# Patient Record
Sex: FEMALE | Race: BLACK OR AFRICAN AMERICAN | NOT HISPANIC OR LATINO | Employment: OTHER | URBAN - METROPOLITAN AREA
[De-identification: names, ages, dates, MRNs, and addresses within clinical notes are randomized per-mention and may not be internally consistent; named-entity substitution may affect disease eponyms.]

---

## 2019-01-05 ENCOUNTER — APPOINTMENT (EMERGENCY)
Dept: CT IMAGING | Facility: HOSPITAL | Age: 84
DRG: 070 | End: 2019-01-05
Payer: MEDICARE

## 2019-01-05 ENCOUNTER — APPOINTMENT (INPATIENT)
Dept: MRI IMAGING | Facility: HOSPITAL | Age: 84
DRG: 070 | End: 2019-01-05
Payer: MEDICARE

## 2019-01-05 ENCOUNTER — HOSPITAL ENCOUNTER (INPATIENT)
Facility: HOSPITAL | Age: 84
LOS: 3 days | Discharge: NURSING, REHAB OR LONG-TERM CARE | DRG: 070 | End: 2019-01-08
Attending: EMERGENCY MEDICINE | Admitting: INTERNAL MEDICINE
Payer: MEDICARE

## 2019-01-05 ENCOUNTER — APPOINTMENT (EMERGENCY)
Dept: RADIOLOGY | Facility: HOSPITAL | Age: 84
DRG: 070 | End: 2019-01-05
Payer: MEDICARE

## 2019-01-05 ENCOUNTER — APPOINTMENT (INPATIENT)
Dept: DIALYSIS | Facility: HOSPITAL | Age: 84
DRG: 070 | End: 2019-01-05
Payer: MEDICARE

## 2019-01-05 DIAGNOSIS — G93.40 ACUTE ENCEPHALOPATHY: ICD-10-CM

## 2019-01-05 DIAGNOSIS — R47.1 DYSARTHRIA: ICD-10-CM

## 2019-01-05 DIAGNOSIS — N18.6 ESRD ON HEMODIALYSIS (HCC): Chronic | ICD-10-CM

## 2019-01-05 DIAGNOSIS — Z78.9 NATIONAL INSTITUTES OF HEALTH (NIH) STROKE SCALE LEVEL OF CONSCIOUSNESS SCORE 0, ALERT; KEENLY RESPONSIVE: Primary | ICD-10-CM

## 2019-01-05 DIAGNOSIS — R47.01 APHASIA: ICD-10-CM

## 2019-01-05 DIAGNOSIS — Z99.2 ESRD ON HEMODIALYSIS (HCC): Chronic | ICD-10-CM

## 2019-01-05 DIAGNOSIS — R47.01 MIXED APHASIA: ICD-10-CM

## 2019-01-05 PROBLEM — I10 ESSENTIAL HYPERTENSION: Chronic | Status: ACTIVE | Noted: 2019-01-05

## 2019-01-05 LAB
ABO GROUP BLD: NORMAL
ANION GAP BLD CALC-SCNC: 15 MMOL/L (ref 4–13)
ANION GAP SERPL CALCULATED.3IONS-SCNC: 11 MMOL/L (ref 4–13)
APTT PPP: 32 SECONDS (ref 26–38)
ATRIAL RATE: 69 BPM
BASE EXCESS BLDA CALC-SCNC: -3 MMOL/L (ref -2–3)
BLD GP AB SCN SERPL QL: NEGATIVE
BUN BLD-MCNC: 44 MG/DL (ref 5–25)
BUN SERPL-MCNC: 51 MG/DL (ref 5–25)
CA-I BLD-SCNC: 1.2 MMOL/L (ref 1.12–1.32)
CA-I BLD-SCNC: 1.22 MMOL/L (ref 1.12–1.32)
CALCIUM SERPL-MCNC: 9.5 MG/DL (ref 8.3–10.1)
CHLORIDE BLD-SCNC: 110 MMOL/L (ref 100–108)
CHLORIDE SERPL-SCNC: 107 MMOL/L (ref 100–108)
CO2 SERPL-SCNC: 22 MMOL/L (ref 21–32)
CREAT BLD-MCNC: 7.1 MG/DL (ref 0.6–1.3)
CREAT SERPL-MCNC: 6.59 MG/DL (ref 0.6–1.3)
ERYTHROCYTE [DISTWIDTH] IN BLOOD BY AUTOMATED COUNT: 16.5 % (ref 11.6–15.1)
GFR SERPL CREATININE-BSD FRML MDRD: 6 ML/MIN/1.73SQ M
GFR SERPL CREATININE-BSD FRML MDRD: 6 ML/MIN/1.73SQ M
GLUCOSE SERPL-MCNC: 111 MG/DL (ref 65–140)
GLUCOSE SERPL-MCNC: 114 MG/DL (ref 65–140)
GLUCOSE SERPL-MCNC: 115 MG/DL (ref 65–140)
HCO3 BLDA-SCNC: 21.8 MMOL/L (ref 24–30)
HCT VFR BLD AUTO: 32.9 % (ref 34.8–46.1)
HCT VFR BLD CALC: 31 % (ref 34.8–46.1)
HCT VFR BLD CALC: 33 % (ref 34.8–46.1)
HGB BLD-MCNC: 10.1 G/DL (ref 11.5–15.4)
HGB BLDA-MCNC: 10.5 G/DL (ref 11.5–15.4)
HGB BLDA-MCNC: 11.2 G/DL (ref 11.5–15.4)
INR PPP: 1.11 (ref 0.86–1.17)
MCH RBC QN AUTO: 27.4 PG (ref 26.8–34.3)
MCHC RBC AUTO-ENTMCNC: 30.7 G/DL (ref 31.4–37.4)
MCV RBC AUTO: 89 FL (ref 82–98)
P AXIS: 59 DEGREES
PCO2 BLD: 22 MMOL/L (ref 21–32)
PCO2 BLD: 23 MMOL/L (ref 21–32)
PCO2 BLD: 38 MM HG (ref 42–50)
PH BLD: 7.37 [PH] (ref 7.3–7.4)
PLATELET # BLD AUTO: 182 THOUSANDS/UL (ref 149–390)
PMV BLD AUTO: 10.1 FL (ref 8.9–12.7)
PO2 BLD: 133 MM HG (ref 35–45)
POTASSIUM BLD-SCNC: 4.9 MMOL/L (ref 3.5–5.3)
POTASSIUM BLD-SCNC: 5 MMOL/L (ref 3.5–5.3)
POTASSIUM SERPL-SCNC: 5 MMOL/L (ref 3.5–5.3)
PR INTERVAL: 148 MS
PROTHROMBIN TIME: 14 SECONDS (ref 11.8–14.2)
QRS AXIS: -14 DEGREES
QRSD INTERVAL: 84 MS
QT INTERVAL: 430 MS
QTC INTERVAL: 451 MS
RBC # BLD AUTO: 3.69 MILLION/UL (ref 3.81–5.12)
RH BLD: POSITIVE
SAO2 % BLD FROM PO2: 99 % (ref 95–98)
SODIUM BLD-SCNC: 141 MMOL/L (ref 136–145)
SODIUM BLD-SCNC: 141 MMOL/L (ref 136–145)
SODIUM SERPL-SCNC: 140 MMOL/L (ref 136–145)
SPECIMEN EXPIRATION DATE: NORMAL
SPECIMEN SOURCE: ABNORMAL
SPECIMEN SOURCE: ABNORMAL
SPECIMEN SOURCE: NORMAL
T WAVE AXIS: 0 DEGREES
TROPONIN I BLD-MCNC: 0.02 NG/ML (ref 0–0.08)
VENTRICULAR RATE: 66 BPM
WBC # BLD AUTO: 7.49 THOUSAND/UL (ref 4.31–10.16)

## 2019-01-05 PROCEDURE — 99222 1ST HOSP IP/OBS MODERATE 55: CPT | Performed by: INTERNAL MEDICINE

## 2019-01-05 PROCEDURE — G8996 SWALLOW CURRENT STATUS: HCPCS

## 2019-01-05 PROCEDURE — 86900 BLOOD TYPING SEROLOGIC ABO: CPT | Performed by: EMERGENCY MEDICINE

## 2019-01-05 PROCEDURE — 93010 ELECTROCARDIOGRAM REPORT: CPT | Performed by: INTERNAL MEDICINE

## 2019-01-05 PROCEDURE — 84484 ASSAY OF TROPONIN QUANT: CPT

## 2019-01-05 PROCEDURE — 99291 CRITICAL CARE FIRST HOUR: CPT | Performed by: PSYCHIATRY & NEUROLOGY

## 2019-01-05 PROCEDURE — 86901 BLOOD TYPING SEROLOGIC RH(D): CPT | Performed by: EMERGENCY MEDICINE

## 2019-01-05 PROCEDURE — 93005 ELECTROCARDIOGRAM TRACING: CPT

## 2019-01-05 PROCEDURE — 1123F ACP DISCUSS/DSCN MKR DOCD: CPT | Performed by: PSYCHIATRY & NEUROLOGY

## 2019-01-05 PROCEDURE — 99285 EMERGENCY DEPT VISIT HI MDM: CPT

## 2019-01-05 PROCEDURE — 85027 COMPLETE CBC AUTOMATED: CPT | Performed by: EMERGENCY MEDICINE

## 2019-01-05 PROCEDURE — 71045 X-RAY EXAM CHEST 1 VIEW: CPT

## 2019-01-05 PROCEDURE — 86850 RBC ANTIBODY SCREEN: CPT | Performed by: EMERGENCY MEDICINE

## 2019-01-05 PROCEDURE — 36415 COLL VENOUS BLD VENIPUNCTURE: CPT | Performed by: EMERGENCY MEDICINE

## 2019-01-05 PROCEDURE — 5A1D70Z PERFORMANCE OF URINARY FILTRATION, INTERMITTENT, LESS THAN 6 HOURS PER DAY: ICD-10-PCS | Performed by: INTERNAL MEDICINE

## 2019-01-05 PROCEDURE — 84295 ASSAY OF SERUM SODIUM: CPT

## 2019-01-05 PROCEDURE — 82803 BLOOD GASES ANY COMBINATION: CPT

## 2019-01-05 PROCEDURE — G8998 SWALLOW D/C STATUS: HCPCS

## 2019-01-05 PROCEDURE — 80048 BASIC METABOLIC PNL TOTAL CA: CPT | Performed by: EMERGENCY MEDICINE

## 2019-01-05 PROCEDURE — 80047 BASIC METABLC PNL IONIZED CA: CPT

## 2019-01-05 PROCEDURE — 99223 1ST HOSP IP/OBS HIGH 75: CPT | Performed by: INTERNAL MEDICINE

## 2019-01-05 PROCEDURE — 85014 HEMATOCRIT: CPT

## 2019-01-05 PROCEDURE — 70450 CT HEAD/BRAIN W/O DYE: CPT

## 2019-01-05 PROCEDURE — 92610 EVALUATE SWALLOWING FUNCTION: CPT

## 2019-01-05 PROCEDURE — 82330 ASSAY OF CALCIUM: CPT

## 2019-01-05 PROCEDURE — 70551 MRI BRAIN STEM W/O DYE: CPT

## 2019-01-05 PROCEDURE — 85730 THROMBOPLASTIN TIME PARTIAL: CPT | Performed by: EMERGENCY MEDICINE

## 2019-01-05 PROCEDURE — 82947 ASSAY GLUCOSE BLOOD QUANT: CPT

## 2019-01-05 PROCEDURE — 84132 ASSAY OF SERUM POTASSIUM: CPT

## 2019-01-05 PROCEDURE — 85610 PROTHROMBIN TIME: CPT | Performed by: EMERGENCY MEDICINE

## 2019-01-05 PROCEDURE — G8997 SWALLOW GOAL STATUS: HCPCS

## 2019-01-05 RX ORDER — LABETALOL 100 MG/1
100 TABLET, FILM COATED ORAL 2 TIMES DAILY
Status: DISCONTINUED | OUTPATIENT
Start: 2019-01-05 | End: 2019-01-08 | Stop reason: HOSPADM

## 2019-01-05 RX ORDER — POLYETHYLENE GLYCOL 3350 17 G/17G
17 POWDER, FOR SOLUTION ORAL DAILY
COMMUNITY

## 2019-01-05 RX ORDER — LORAZEPAM 2 MG/ML
1 INJECTION INTRAMUSCULAR ONCE
Status: COMPLETED | OUTPATIENT
Start: 2019-01-05 | End: 2019-01-05

## 2019-01-05 RX ORDER — ALBUTEROL SULFATE 2.5 MG/3ML
2.5 SOLUTION RESPIRATORY (INHALATION) EVERY 6 HOURS PRN
COMMUNITY

## 2019-01-05 RX ORDER — AMLODIPINE BESYLATE 5 MG/1
5 TABLET ORAL DAILY
Status: DISCONTINUED | OUTPATIENT
Start: 2019-01-06 | End: 2019-01-08 | Stop reason: HOSPADM

## 2019-01-05 RX ORDER — ALBUTEROL SULFATE 90 UG/1
2 AEROSOL, METERED RESPIRATORY (INHALATION) EVERY 6 HOURS PRN
COMMUNITY

## 2019-01-05 RX ORDER — SODIUM CHLORIDE 9 MG/ML
40 INJECTION, SOLUTION INTRAVENOUS CONTINUOUS
Status: DISPENSED | OUTPATIENT
Start: 2019-01-05 | End: 2019-01-06

## 2019-01-05 RX ORDER — KETOROLAC TROMETHAMINE 4 MG/ML
1 SOLUTION/ DROPS OPHTHALMIC 4 TIMES DAILY
COMMUNITY

## 2019-01-05 RX ORDER — AMLODIPINE BESYLATE 5 MG/1
5 TABLET ORAL DAILY
COMMUNITY
End: 2019-12-27 | Stop reason: HOSPADM

## 2019-01-05 RX ORDER — ASPIRIN 325 MG
325 TABLET ORAL ONCE
Status: COMPLETED | OUTPATIENT
Start: 2019-01-05 | End: 2019-01-05

## 2019-01-05 RX ORDER — BUSPIRONE HYDROCHLORIDE 5 MG/1
5 TABLET ORAL 3 TIMES DAILY
Status: DISCONTINUED | OUTPATIENT
Start: 2019-01-05 | End: 2019-01-08 | Stop reason: HOSPADM

## 2019-01-05 RX ORDER — BUSPIRONE HYDROCHLORIDE 5 MG/1
5 TABLET ORAL 3 TIMES DAILY
COMMUNITY

## 2019-01-05 RX ORDER — NITROGLYCERIN 0.4 MG/1
0.4 TABLET SUBLINGUAL
COMMUNITY

## 2019-01-05 RX ORDER — ALBUMIN (HUMAN) 12.5 G/50ML
12.5 SOLUTION INTRAVENOUS ONCE
Status: COMPLETED | OUTPATIENT
Start: 2019-01-05 | End: 2019-01-05

## 2019-01-05 RX ORDER — ALBUTEROL SULFATE 2.5 MG/3ML
2.5 SOLUTION RESPIRATORY (INHALATION) EVERY 6 HOURS PRN
Status: DISCONTINUED | OUTPATIENT
Start: 2019-01-05 | End: 2019-01-08 | Stop reason: HOSPADM

## 2019-01-05 RX ORDER — ACETAMINOPHEN 325 MG/1
650 TABLET ORAL EVERY 6 HOURS PRN
COMMUNITY

## 2019-01-05 RX ORDER — FENTANYL 25 UG/H
1 PATCH TRANSDERMAL
Status: ON HOLD | COMMUNITY
End: 2020-01-08 | Stop reason: SDUPTHER

## 2019-01-05 RX ORDER — DOCUSATE SODIUM 100 MG/1
100 CAPSULE, LIQUID FILLED ORAL 2 TIMES DAILY
COMMUNITY

## 2019-01-05 RX ORDER — HEPARIN SODIUM 5000 [USP'U]/ML
5000 INJECTION, SOLUTION INTRAVENOUS; SUBCUTANEOUS EVERY 8 HOURS SCHEDULED
Status: DISCONTINUED | OUTPATIENT
Start: 2019-01-05 | End: 2019-01-08 | Stop reason: HOSPADM

## 2019-01-05 RX ORDER — ONDANSETRON 2 MG/ML
4 INJECTION INTRAMUSCULAR; INTRAVENOUS EVERY 6 HOURS PRN
Status: DISCONTINUED | OUTPATIENT
Start: 2019-01-05 | End: 2019-01-08 | Stop reason: HOSPADM

## 2019-01-05 RX ORDER — ATORVASTATIN CALCIUM 40 MG/1
40 TABLET, FILM COATED ORAL EVERY EVENING
Status: DISCONTINUED | OUTPATIENT
Start: 2019-01-05 | End: 2019-01-08 | Stop reason: HOSPADM

## 2019-01-05 RX ORDER — ASPIRIN 325 MG
325 TABLET ORAL DAILY
Status: DISCONTINUED | OUTPATIENT
Start: 2019-01-06 | End: 2019-01-08

## 2019-01-05 RX ORDER — LABETALOL 100 MG/1
100 TABLET, FILM COATED ORAL 2 TIMES DAILY
COMMUNITY
End: 2020-01-08 | Stop reason: HOSPADM

## 2019-01-05 RX ORDER — SERTRALINE HYDROCHLORIDE 25 MG/1
50 TABLET, FILM COATED ORAL DAILY
COMMUNITY

## 2019-01-05 RX ORDER — ACETAMINOPHEN 325 MG/1
650 TABLET ORAL EVERY 6 HOURS PRN
Status: DISCONTINUED | OUTPATIENT
Start: 2019-01-05 | End: 2019-01-08 | Stop reason: HOSPADM

## 2019-01-05 RX ADMIN — ALBUMIN HUMAN 12.5 G: 0.25 SOLUTION INTRAVENOUS at 18:53

## 2019-01-05 RX ADMIN — LORAZEPAM 1 MG: 2 INJECTION INTRAMUSCULAR; INTRAVENOUS at 15:40

## 2019-01-05 RX ADMIN — HEPARIN SODIUM 5000 UNITS: 5000 INJECTION, SOLUTION INTRAVENOUS; SUBCUTANEOUS at 14:56

## 2019-01-05 RX ADMIN — HEPARIN SODIUM 5000 UNITS: 5000 INJECTION, SOLUTION INTRAVENOUS; SUBCUTANEOUS at 22:28

## 2019-01-05 RX ADMIN — SODIUM CHLORIDE 40 ML/HR: 0.9 INJECTION, SOLUTION INTRAVENOUS at 19:09

## 2019-01-05 RX ADMIN — ASPIRIN 325 MG: 325 TABLET ORAL at 10:35

## 2019-01-05 NOTE — CONSULTS
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:  Hemoglobin is 10 1 will not be able the receive any CARRIE at this point given acute CVA  3  Fluid management:  Her dry weight as an outpatient is 82 5   UF goal is even    4  Acute left hemispheric CVA:  Management per Neurology    5  Permissive hypertension:  Allow increased blood pressure in the setting of acute CVA as noted above  Plan:  As above      History of Present Illness   Physician Requesting Consult: Nancy Toussaint MD  Reason for Consult / Principal Problem:  End-stage renal disease on hemodialysis  Hx and PE limited by:   HPI: Dallin Lima is a 81y o  year old female who presents with aphasia  I was contacted by the emergency room as the patient was brought to the emergency room and she did not have dialysis  I saw the patient approximately 1:00 p m  This afternoon She has a history of end-stage renal disease on hemodialysis and normally dialyzes at [address] on a Tuesday Thursday Saturday basis  I called the outpatient dialysis unit  The patient presented with increased focal weakness when I spoke with the patient's nurse  The patient was brought to the emergency room and I saw the patient the emergency room earlier this afternoon  She was found to have an acute left hemispheric CVA  I spoke with inpatient dialysis nurse and the patient was/is being dialyzed this afternoon  We are allowed for permissive hypertension given the acute left hemispheric CVA  Last blood pressure was 197/74      Inpatient consult to Nephrology  Consult performed by: Yamini Strange ordered by: Dhara Ortiz DEANGELO          General:  Increased fatigue she denies any weakness for me this afternoon  Cardiovascular:  No chest pressure or shortness of Breath  Respiratory:  No cough no epistaxis no hemoptysis reported  Gastrointestinal:  No nausea vomiting diarrhea constipation reported  Genitourinary:  No urgency frequency hematuria reported  Outside of the above review of systems, all others are essentially negative    Historical Information   Past Medical History:   Diagnosis Date    Altered mental status, unspecified     Anemia     Atherosclerotic heart disease of native coronary artery without angina pectoris     Cancer ([place] [address] )     Chest pain     Chronic diastolic (congestive) heart failure (HCC)     Dependence [facility] ([facility] )     Diabetes mellitus ([place] [address] )     End-stage renal disease ([place] [address] )     Gastro-esophageal reflux disease without esophagitis     Hyperlipidemia     Hypertension     Long term current use of anticoagulant     Long term current use of insulin ([facility])     Muscle weakness     Nausea with vomiting     Other abnormalities of gait and mobility     Other specified abnormal findings of blood chemistry     Type 2 diabetes mellitus ([facility])     Ventral hernia without obstruction or gangrene     Vitamin D deficiency      Past Surgical History:   Procedure Laterality Date    AV FISTULA PLACEMENT      MASTECTOMY      R arm     Social History   History   Alcohol Use No     History   Drug Use No     History   Smoking Status    Never Smoker   Smokeless Tobacco    Not on file     History reviewed  No pertinent family history      Meds/Allergies   all current active meds have been reviewed, current meds: Current Facility-Administered Medications   Medication Dose Route Frequency    acetaminophen (TYLENOL) tablet 650 mg  650 mg Oral Q6H PRN    albuterol inhalation solution 2 5 mg  2 5 mg Nebulization Q6H PRN    alteplase (ACTIVASE) 100 mg injection **ADS Override Pull**        [START ON 1/6/2019] amLODIPine (NORVASC) tablet 5 mg  5 mg Oral Daily    [START ON 1/6/2019] aspirin tablet 325 mg  325 mg Oral Daily    atorvastatin (LIPITOR) tablet 40 mg  40 mg Oral QPM    busPIRone (BUSPAR) tablet 5 mg  5 mg Oral TID    heparin (porcine) subcutaneous injection 5,000 Units  5,000 Units Subcutaneous Q8H [facility]    labetalol (NORMODYNE) tablet 100 mg  100 mg Oral BID    ondansetron (ZOFRAN) injection 4 mg  4 mg Intravenous Q6H PRN    [START ON 1/6/2019] sertraline (ZOLOFT) tablet 50 mg  50 mg Oral Daily    and PTA meds:  Prescriptions Prior to Admission   Medication    acetaminophen (TYLENOL) 325 mg tablet    albuterol (2 5 mg/3 mL) 0 083 % nebulizer solution    albuterol (PROVENTIL HFA,VENTOLIN HFA) 90 mcg/act inhaler    amLODIPine (NORVASC) 5 mg tablet    bisacodyl (DULCOLAX) 5 mg EC tablet    busPIRone (BUSPAR) 5 mg tablet    docusate sodium (COLACE) 100 mg capsule    fentaNYL (DURAGESIC) 25 mcg/hr    ketorolac (ACULAR) 0 4 % SOLN    labetalol (NORMODYNE) 100 mg tablet    nitroglycerin (NITROSTAT) 0 4 mg SL tablet    polyethylene glycol (MIRALAX) 17 g packet    sertraline (ZOLOFT) 25 mg tablet       Allergies   Allergen Reactions    Percolone [Oxycodone]     Sulfa Antibiotics        Objective     Intake/Output Summary (Last 24 hours) at 01/05/19 3108  Last data filed at 01/05/19 1715   Gross per 24 hour   Intake              200 ml   Output                0 ml   Net              200 ml       Invasive Devices:        General: patient in NAD  Skin:   Eyes:   ENT:   Neck:   Chest:   CVS:   Abdomen:   Extremities:  Neuro:   Psych:     Current Weight: Weight - Scale: 84 6 kg (186 lb 8 2 oz)  First Weight: Weight - Scale: 84 6 kg (186 lb 8 2 oz)    Lab Results:  I have personally reviewed pertinent labs    CBC: Lab Results   Component Value Date    WBC 7 49 01/05/2019    RBC 3 69 (L) 01/05/2019     CMP: Lab Results   Component Value Date     01/05/2019    CO2 23 01/05/2019    BUN 51 (H) 01/05/2019    CREATININE 6 59 (H) 01/05/2019    GLUCOSE 111 01/05/2019    CALCIUM 9 5 01/05/2019    EGFR 6 01/05/2019     Phosphorus: No results found for: PHOS  Magnesium: No results found for: MG  Urinalysis: No results found for: COLORU, CLARITYU, SPECGRAV, PHUR, LEUKOCYTESUR, NITRITE, PROTEINUA, GLUCOSEU, KETONESU, BILIRUBINUR, BLOODU  BMP: Lab Results   Component Value Date    GLUCOSE 111 01/05/2019    SODIUM 140 01/05/2019    CHLORIDE 110 (H) 01/05/2019    CO2 23 01/05/2019    BUN 51 (H) 01/05/2019    CREATININE 6 59 (H) 01/05/2019    CALCIUM 9 5 01/05/2019

## 2019-01-05 NOTE — QUICK NOTE
Received call from [facility] for RN concerning patient's mental status  Went to personally evaluate the patient  She is alert to herself, but unable to answer any other questions  I suspect that this benzodiazepine due secondary to the patient's Ativan doses before MRI  Her MRI is resulted and is negative for any acute CVA  The patient has been removed from stroke pathway  I discussed with nephrologist Dr Henry Su who will hold dialysis for tonight given change in mental status  I discussed with him as far as some fluid resuscitation for this patient appears dry on exam   We agree to give the patient a 1 time dose of 25% albumin as well as 250 cc of fluid given over 6 hours  Stroke pathway has been discontinued  Plan discussed with ARSALAN Wolfe PA-C

## 2019-01-05 NOTE — CONSULTS
Consultation - Neurology   Rafael Larson 81 y o  female MRN: [id number]  Unit/Bed#: ED 05 Encounter: [id number]      Physician Requesting Consult: [address] [place] [address] to Neurology  Consult performed by: Cristina MAHONEY  Consult ordered by: Bar Suárez        Reason for Consult / Principal Problem: stroke alert    Assessment:  Acute left hemispheric cva, cardioembolic vs atheroembolic, aphasia resolved therefore although within therapeutic window for IV tpa, not administered  Pt is currently mildly encephalopathic  Perhaps distal clot, cannot exclude seizure such as complex partial event as no witnessed generalization  No known seizure hx  Unlikely hypertensive encephalopathy as clinical exam has improved however still hypertensive  Given IV access issues unable to obtain cta head/neck however low suspicion there would be a proximal retrieval clot given the rapid improvement, possibly a distal clot if at all that wouldn't be likely retrievable  Plan:  Admit to slim  Stroke pathway  Aspirin 325 mg then 81 mg daily   lipitor 80 mg  Tele  2-D echo  Mri brain w/o contrast, mra head/neck w/o contrast  If brain imaging negative for acute cva would recommend eeg on Monday  Continue regular dialysis regimen  Give prn antihypertensives for sbp>200, 48 hour permissive htn for now  Stat head ct with any neuro change      HPI: Rafael Larson is a 81 y o  female who was last normal around 7:30 am at [address] [place] [address] facility had just arrived at dialysis center around 8 am when they noticed she was having trouble speaking and processing info  Not complaining of any discomfort  Dialysis was not yet started  EMS called and prehospital stroke alert at [address] [place] [address] at 8:30 am   Neurology team call back at 8:30 am and bedside at 9 am  CT head showing white matter disease, chronic left frontal and rt cerebellar cva, no acute hemorrage or early signs of acute strok    Pt per nursing staff and ED attending still had mixed aphasia and intermittent dysarthria during their initial evaluation however by the time I arrived the aphasia had resolved and I didn't notice any dysarhthria  nih SS 6 pts with 1 pt for level of conscousness, 2 pt motor for each LE, 1 pt extinction/inattention however motor points likely chronic and given the resolved aphasia and decision made not to give IV tpa  ROS:  Per 12 point review, negative except for pain in knees, shoulders, blurry vision  Historical Information   No past medical history on file  No past surgical history on file  Social History   History   Smoking Status    Not on file   Smokeless Tobacco    Not on file     History   Alcohol use Not on file     History   Drug use: Unknown       Family History: non-contributory      Meds/Allergies     Allergies   Allergen Reactions    Percolone [Oxycodone]     Sulfa Antibiotics        all current active meds have been reviewed    Objective   Vitals:Blood pressure (!) 196/83, pulse 69, temperature 98 3 °F (36 8 °C), temperature source Oral, resp  rate 17, weight 84 6 kg (186 lb 8 2 oz), SpO2 98 %  ,Body mass index is 33 04 kg/m²  No intake or output data in the 24 hours ending 01/05/19 0948        Physical Exam:   Physical Exam General appearance: sluggish, no apparent distress  Lungs: clear to auscultation bilaterally  Heart: regular rate and rhythm    Neurologic:  Cognitive:  Mental status: somnolent, easily arousable to verbal  Oriented to self, [facility], not sure why she's here or what happened, said she was at the dialysis center and people began asking her questions  Denies any pain or any other issues  Able to read words, name objects, tell time, follow simple commands, explain what is happening in the pictures from the stroke booklet, able to tell how many fingers i'm holding up  At times right/left confusion not consistent   She did consistently state I was touching the right leg when I was touching both legs  No evidence of neglect  She was able to tell me that she has difficulty moving her legs normally and that she can't really move her feet  CN: CNII-XII normal except both pupils 1 5 mm trace reactive bilat, slightly dysconjugate exotropic bilat, cannot fully move eye all the way to either side and no upgaze  Decreased hearing to finger rub on the left side  Blink to threat intact bilat  Motor: normal bulk throughout  No involuntary movements noted  Increased tone throughout, mostly bilat LE, LLE>RLE  5 power ue bilat with 4+ handgrip bilat, limited range of movement given shoulder issues  Unable to lift either LE antigravity, can lock out legs straightening them out and hold in this position but cannot elevate off the bed  Appears to be chronic  Rt quad/hs 5, dorsiflexion 1, plantarflexion 5  On the left side given more stiffness in the knee, can barely flex at the knee thus cannot assess hamstring  Left quad is 5, doriflexion 1, plantarflexion 5  Both feet in downward plantarflexed position with minimal range in dorsiflexion movement  Sensory: light touch, temperature sensation intact throughout  Cerebellar: cannot due finger to nose bilat given structural limitations (both shoulders) and can place both feet barely onto shin but given significant knee stiffness, especially on the left, unable to move it proximally  DTR's: 2 rue, 2+ lue, negative duron bilat, patellars 2+ right, 2 left, ankles trace bilat, negative cross adductors  Plantars: downgoing bilat        Lab Results: I have personally reviewed pertinent reports  Imaging Studies: I have personally reviewed pertinent reports  EKG, Pathology, and Other Studies: I have personally reviewed pertinent films in PACS        Counseling / Coordination of Care  45 min critical care time spent

## 2019-01-05 NOTE — ED NOTES
Spoke with pts Daughter Jane Leal, updated on pts status and expected plan of care       Antwan Fleming RN  01/05/19 5741

## 2019-01-05 NOTE — ASSESSMENT & PLAN NOTE
· POA, patient's mental status is improving and appears to be at baseline  I believe that the patient's functional status is overall low  Her MRI was negative for acute infarction   Daughter reports that this happened earlier this summer and she "snapped out of it"  · Management as per primary   · Monitor for infectious process   · Encourage PO intake  · Speech consult

## 2019-01-05 NOTE — ED NOTES
Spoke with Yobani Bowers from MRI, will be over pt for in approx 10 mins        Taylor Maki RN  01/05/19 6477

## 2019-01-05 NOTE — SPEECH THERAPY NOTE
Speech-Language Pathology Bedside Swallow Evaluation      Patient Name: Dorita Vanegas    AOTGE'W Date: 2019     Problem List  Patient Active Problem List   Diagnosis    Mixed aphasia    [facility]    Essential hypertension    Acute encephalopathy       Past Medical History  Past Medical History:   Diagnosis Date    Altered mental status, unspecified     Anemia     Atherosclerotic heart disease of native coronary artery without angina pectoris     Cancer ([facility]     Chest pain     Chronic diastolic (congestive) heart failure (HCC)     Dependence on renal dialysis ([facility]     [facility]     End-stage renal disease ([facility]     Gastro-esophageal reflux disease without esophagitis     Hyperlipidemia     Hypertension     Long term current use of anticoagulant     Long term current use of insulin (HCC)     Muscle weakness     Nausea with vomiting     Other abnormalities of gait and mobility     Other specified abnormal findings of blood chemistry     Type 2 diabetes mellitus (HCC)     Ventral hernia without obstruction or gangrene     Vitamin D deficiency        Past Surgical History  Past Surgical History:   Procedure Laterality Date    AV FISTULA PLACEMENT      MASTECTOMY      R arm           History of Present Illness:     Francisco Boucher a 81 y  o  female with a history of ESRD on HD, HTN who presents with aphasia  The patient's daughter reports that she was transported for hemodialysis and when she got to hemodialysis she was behaving different  The patient is able to state that she was unable to say a person's name at dialysis and that she was having a hard time understanding what other people were saying  The patient's daughter reports that sometimes the patient has a hard time speaking at her baseline, but states that she had a similar episode like this last summer which she just "snapped out of"   She was taken to [facility] at that time, but a work up was not completed due to insufficient staffing  The patient denied any symptoms such as numbness, tingling, or weakness in her arms, legs, or face  She denied dizziness or difficulty swallowing  The patient's daughter reports that she is slurring her speech at this time as well as it appears to her that her left face is drooping some, specifically around her mouth  Denies chest pain, pressure, or palpitations  Denies fevers or chills  Denies abdominal pain, nausea, vomiting, or diarrhea  Daughter reports that she saw her mother earlier in the week and she was doing well, but she did start to notice changes late yesterday  DX:  R/O CVA vs encephalopathy vs other  Pt failed RN Dysphagia Assessment  SLP evaluation requested to assess pt (presume swallowing and speech/language skills)  Process begun this pm c Bedside Swallowing Evaluation  Past medical history:  Please see H&P for details    Special Studies:  1  CT 1/5: Cerebral atrophy with chronic small vessel ischemic change  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   2  MRI pending      Swallow Information      Current Symptoms/Concerns: change in MS, neuro hx    Current Diet: Renal diet ordered, I presumed diet held after seeing pt failed RN Assessment      Baseline Diet: regular diet and thin liquids presumed      Baseline Assessment   Behavior/Cognition: alert    Speech/Language Status: Pt "staring" upon arrival   Made eye contact with greeting  Followed a few simple commands (imitated some lip and arm movements and imitated phonation)  Produced some intelligible "automatic speech" ("Hello  Yes  I don't know)  In general, there was a paucity of speech c limited intelligible utterances (intelligibility reduced in part by reduced oral opening and effort but ?some nonsensical speech or neologisms)    Southern accent noted (when asked, was able to report, I lived in [place])/    Patient Positioning: upright in bed    Pain Status/Interventions/Response to Interventions:  No report of or nonverbal indications of pain  Swallow Mechanism Exam     Facial: mild L weakness  Labial: WFL  Lingual: did not imitate movements except protrusion c mild L deviation noted, but reflexive lip licking c good rom  Velum: unable to visualize   Dentition: adequate natural dentition  Mandible: adequate ROM  Vocal quality:clear/adequate     Consistencies Assessed and Performance   Consistencies/Matewrials Administered: thin liquid and yang crackers  Pt nodded "no" to other foods ("full")  I then located RN who clarified that error was made when documenting RN Dysphagia Assessment  Pt has passed assessment & had eaten lunch  Oral Stage: WFL with thin liquid by straw  Note pt took only single sips  Mastication was adequate with the materials administered today  Bolus formation and transfer were functional with no significant oral residue noted  No overt s/s reduced oral control  Pharyngeal Stage: WFL    Swallow Mechanics:  Swallowing initiation appeared prompt  Laryngeal rise was palpated and judged to be within functional limits  No coughing, throat clearing, change in vocal quality or respiratory status noted today  Esophageal Concerns: none reported      Summary   Pt presented with functional appearing oral and pharyngeal stage swallowing skills with limited materials administered today (3ozs water, yang crackers)  She drank slowly (regulated rate which should maximize safety)    Recommendations: 1  Continue diet as per MD (as medically & clinically indicated)  2   Await MRI and complete speech/language evaluation as indicated    Recommended Form of Meds: as tolerated    Aspiration precautions and compensatory swallowing strategies: feed when attentive to task

## 2019-01-05 NOTE — ED NOTES
Pt transported to [facility], [facility]  01/05/19 6458

## 2019-01-05 NOTE — HEMODIALYSIS
Patient received only 40 minutes of dialysis today  Patient was rule out CVA and d/t drop in BP patient taken off early per Dr Frantz Kelley

## 2019-01-05 NOTE — ASSESSMENT & PLAN NOTE
· POA, improved  MRI negative for CVA  ? Epileptic event  ?Dehydration given below dry weight   Neurology recommendations noted and appreciated  · Discontinue stroke pathway   · Check EEG   · Neuro checks

## 2019-01-05 NOTE — PROGRESS NOTES
[facility]  Progress Note - [facility] 1935, 81 y o  female MRN: [id number]    Unit/Bed#: ED 05 Encounter: [id number]    Primary Care Provider: Shannon Metcalf MD   Date and time admitted to hospital: 1/5/2019  8:35 AM        * Mixed aphasia   Assessment & Plan    · POA, slightly improving, but does have this in addition to lateralizing symptoms including left facial droop, left tongue deviation, and left arm weakness  As per daughter this happened earlier in the summer as well, but no work up was completed due to [facility] not having proper staffing  She had "snapped out of it" before  Neurology recommendations noted and appreciated  · Admit patient to med/surg under inpatient status under stroke pathway   · Consult Neuro   · Telemetry x 24 hours   · MRI   · Echo  · Neuro checks  · FLP/A1c  · ASA  · Lipitor  · PT/OT/Speech  · Serial NIH     Acute encephalopathy   Assessment & Plan    · POA, patient's mental status is improving, but is still slow to respond to questions  Daughter reports that this happened earlier this summer and she "snapped out of it"  · Management as per primary   · Monitor for infectious process   · Encourage PO intake - she has passed dysphagia assessment     ESRD on hemodialysis [facility])   Assessment & Plan    · Gets HD on TThS  She missed HD today   · Consult nephrology for management      Essential hypertension   Assessment & Plan    · BP elevated on admission, but want permissive HTN as per primary problem   · Continue Norvasc, Labetalol with hold parameters        VTE Prophylaxis: Heparin  / sequential compression device   Code Status: DNR/DNI  POLST: POLST form is not discussed and not completed at this time  Discussion with family: Discussed with daughter at bedside     Anticipated Length of Stay:  Patient will be admitted on an Inpatient basis with an anticipated length of stay of  Greater than 2 midnights     Justification for Hospital Stay: CVA symptoms Total Time for Visit, including Counseling / Coordination of Care: 1 hour  Greater than 50% of this total time spent on direct patient counseling and coordination of care  Chief Complaint:   Aphasia     History of Present Illness:    Jamal Colindres is a 81 y o  female with a history of ESRD on HD, HTN who presents with aphasia  The patient's daughter reports that she was transported for hemodialysis and when she got to hemodialysis she was behaving different  The patient is able to state that she was unable to say a person's name at dialysis and that she was having a hard time understanding what other people were saying  The patient's daughter reports that sometimes the patient has a hard time speaking at her baseline, but states that she had a similar episode like this last summer which she just "snapped out of"  She was taken to [facility] at that time, but a work up was not completed due to insufficient staffing  The patient denied any symptoms such as numbness, tingling, or weakness in her arms, legs, or face  She denied dizziness or difficulty swallowing  The patient's daughter reports that she is slurring her speech at this time as well as it appears to her that her left face is drooping some, specifically around her mouth  Denies chest pain, pressure, or palpitations  Denies fevers or chills  Denies abdominal pain, nausea, vomiting, or diarrhea  Daughter reports that she saw her mother earlier in the week and she was doing well, but she did start to notice changes late yesterday  Review of Systems:    Review of Systems   Constitutional: Negative for appetite change, chills, diaphoresis, fatigue and fever  HENT: Negative for congestion, rhinorrhea and sore throat  Eyes: Negative for visual disturbance  Respiratory: Negative for cough, chest tightness, shortness of breath and wheezing  Cardiovascular: Negative for chest pain, palpitations and leg swelling     Gastrointestinal: Negative for abdominal pain, constipation, diarrhea, nausea and vomiting  Genitourinary: Negative for dysuria  Musculoskeletal: Positive for gait problem (Chronic)  Negative for arthralgias and myalgias  Neurological: Positive for speech difficulty  Negative for dizziness, syncope, weakness, light-headedness, numbness and headaches  All other systems reviewed and are negative  Past Medical and Surgical History:     Past Medical History:   Diagnosis Date    Anemia     Cancer ([facility] )     Diabetes mellitus ([facility] )     End-stage renal disease ([place] [address] )     Hypertension        Past Surgical History:   Procedure Laterality Date    AV FISTULA PLACEMENT      MASTECTOMY      R arm       Meds/Allergies:    Prior to Admission medications    Medication Sig Start Date End Date Taking?  Authorizing Provider   acetaminophen (TYLENOL) 325 mg tablet Take 650 mg by mouth every 6 (six) hours as needed for mild pain   Yes Historical Provider, MD   albuterol (2 5 mg/3 mL) 0 083 % nebulizer solution Take 2 5 mg by nebulization every 6 (six) hours as needed for wheezing   Yes Historical Provider, MD   albuterol (PROVENTIL HFA,VENTOLIN HFA) 90 mcg/act inhaler Inhale 2 puffs every 6 (six) hours as needed for wheezing   Yes Historical Provider, MD   amLODIPine (NORVASC) 5 mg tablet Take 5 mg by mouth daily   Yes Historical Provider, MD   bisacodyl (DULCOLAX) 5 mg EC tablet Take 5 mg by mouth daily as needed for constipation   Yes Historical Provider, MD   busPIRone (BUSPAR) 5 mg tablet Take 5 mg by mouth 3 (three) times a day   Yes Historical Provider, MD   docusate sodium (COLACE) 100 mg capsule Take 100 mg by mouth 2 (two) times a day   Yes Historical Provider, MD   fentaNYL (DURAGESIC) 25 mcg/hr Place 1 patch on the skin every third day   Yes Historical Provider, MD   ketorolac (ACULAR) 0 4 % SOLN 1 drop 4 (four) times a day   Yes Historical Provider, MD   labetalol (NORMODYNE) 100 mg tablet Take 100 mg by mouth 2 (two) times a day Yes Historical Provider, MD   nitroglycerin (NITROSTAT) 0 4 mg SL tablet Place 0 4 mg under the tongue every 5 (five) minutes as needed for chest pain   Yes Historical Provider, MD   polyethylene glycol (MIRALAX) 17 g packet Take 17 g by mouth daily   Yes Historical Provider, MD   sertraline (ZOLOFT) 25 mg tablet Take 50 mg by mouth daily   Yes Historical Provider, MD     I have reviewed home medications with patient family member  Allergies: Allergies   Allergen Reactions    Percolone [Oxycodone]     Sulfa Antibiotics        Social History:     Marital Status:    Occupation: None  Patient Pre-hospital Living Situation: SNF  Patient Pre-hospital Level of Mobility: Bedbound mostly  Patient Pre-hospital Diet Restrictions: None  Substance Use History:   History   Alcohol Use No     History   Smoking Status    Never Smoker   Smokeless Tobacco    Not on file     History   Drug Use No       Family History:    History reviewed  No pertinent family history  Physical Exam:     Vitals:   Blood Pressure: 165/73 (01/05/19 1230)  Pulse: 58 (01/05/19 1230)  Temperature: 98 3 °F (36 8 °C) (01/05/19 0945)  Temp Source: Oral (01/05/19 0945)  Respirations: 18 (01/05/19 1121)  Weight - Scale: 84 6 kg (186 lb 8 2 oz) (01/05/19 0903)  SpO2: 99 % (01/05/19 1230)    Physical Exam   Constitutional: Vital signs are normal  She appears well-developed and well-nourished  Non-toxic appearance  No distress  HENT:   Head: Normocephalic and atraumatic  Mouth/Throat: Mucous membranes are not dry  Eyes: Pupils are equal, round, and reactive to light  Conjunctivae and EOM are normal  No scleral icterus  Right pupil is round and reactive  Left pupil is round and reactive  Pupils are equal    Pupils miotic, but symmetrical and reactive     Neck: Neck supple  Cardiovascular: Normal rate, regular rhythm, S1 normal, S2 normal, normal heart sounds and intact distal pulses  Exam reveals no S3 and no S4      No murmur heard   Pulmonary/Chest: Effort normal and breath sounds normal  No accessory muscle usage  No respiratory distress  She has no decreased breath sounds  She has no wheezes  She has no rhonchi  She has no rales  She exhibits no tenderness  Abdominal: Soft  Bowel sounds are normal  She exhibits no distension and no mass  There is no tenderness  There is no rigidity, no rebound and no guarding  Neurological: She is alert  She displays no tremor  A cranial nerve deficit (There is left downward facial droop and left tongue deviation) is present  No sensory deficit  She displays no seizure activity  GCS eye subscore is 4  GCS verbal subscore is 4  GCS motor subscore is 6    4/5 strength in left upper extremity  5/5 in right upper  0/5 in bilateral lower extremity, but this is baseline   Skin: Skin is warm and dry  Additional Data:     Lab Results: I have personally reviewed pertinent reports  Results from last 7 days  Lab Units 01/05/19  0855  01/05/19  0850   WBC Thousand/uL  --   --  7 49   HEMOGLOBIN g/dL  --   --  10 1*   I STAT HEMOGLOBIN g/dl 11 2*  < >  --    HEMATOCRIT %  --   --  32 9*   HEMATOCRIT, ISTAT % 33*  < >  --    PLATELETS Thousands/uL  --   --  182   < > = values in this interval not displayed  Results from last 7 days  Lab Units 01/05/19  0855 01/05/19  0854  01/05/19  0850   SODIUM mmol/L  --   --   --  140   POTASSIUM mmol/L  --   --   --  5 0   CHLORIDE mmol/L  --   --   --  107   CO2 mmol/L  --   --   --  22   CO2, I-STAT mmol/L 23 22  < >  --    BUN mg/dL  --   --   --  51*   CREATININE mg/dL  --   --   --  6 59*   AGAP mmol/L  --  15*  --   --    ANION GAP mmol/L  --   --   --  11   CALCIUM mg/dL  --   --   --  9 5   GLUCOSE RANDOM mg/dL  --   --   --  115   < > = values in this interval not displayed  Results from last 7 days  Lab Units 01/05/19  0851   INR  1 11                   Imaging: I have personally reviewed pertinent reports        XR stroke alert portable chest   Final Result by Joe Guadalupe DO (01/05 0901)   No acute cardiopulmonary disease  Workstation performed: [id number]         CT stroke alert brain   Final Result by Joe Guadalupe DO (01/05 0900)   Cerebral atrophy with chronic small vessel ischemic change  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

## 2019-01-05 NOTE — ASSESSMENT & PLAN NOTE
· BP elevated on admission, but has improved   No need for permissive hypertension   · Continue Norvasc, Labetalol

## 2019-01-05 NOTE — H&P
[facility]  Progress Note - Daniel Blevins 1935, 81 y o  female MRN: [id number]     Unit/Bed#: ED 05 Encounter: [id number]     Primary Care Provider: Vijaya Duff MD   Date and time admitted to hospital: 1/5/2019  8:35 AM               * Mixed aphasia   Assessment & Plan     · POA, slightly improving, but does have this in addition to lateralizing symptoms including left facial droop, left tongue deviation, and left arm weakness  As per daughter this happened earlier in the summer as well, but no work up was completed due to [facility] not having proper staffing  She had "snapped out of it" before  Neurology recommendations noted and appreciated  ? Admit patient to med/surg under inpatient status under stroke pathway   § Consult Neuro   § Telemetry x 24 hours   § MRI   § Echo  § Neuro checks  § FLP/A1c  § ASA  § Lipitor  § PT/OT/Speech  § Serial NIH      Acute encephalopathy   Assessment & Plan     · POA, patient's mental status is improving, but is still slow to respond to questions  Daughter reports that this happened earlier this summer and she "snapped out of it"  ? Management as per primary   ? Monitor for infectious process   ? Encourage PO intake - she has passed dysphagia assessment      ESRD on hemodialysis [facility])   Assessment & Plan     · Gets HD on [facility]Th[facility]  She missed HD today   ? Consult nephrology for management       Essential hypertension   Assessment & Plan     · BP elevated on admission, but want permissive HTN as per primary problem   ? Continue Norvasc, Labetalol with hold parameters          VTE Prophylaxis: Heparin  / sequential compression device   Code Status: DNR/DNI  POLST: POLST form is not discussed and not completed at this time  Discussion with family: Discussed with daughter at bedside      Anticipated Length of Stay:  Patient will be admitted on an Inpatient basis with an anticipated length of stay of  Greater than 2 midnights     Justification for YOMI BANSAL Stay: CVA symptoms      Total Time for Visit, including Counseling / Coordination of Care: 1 hour  Greater than 50% of this total time spent on direct patient counseling and coordination of care      Chief Complaint:   Aphasia      History of Present Illness:     Daniel Blevins is a 81 y o  female with a history of ESRD on HD, HTN who presents with aphasia  The patient's daughter reports that she was transported for hemodialysis and when she got to hemodialysis she was behaving different  The patient is able to state that she was unable to say a person's name at dialysis and that she was having a hard time understanding what other people were saying  The patient's daughter reports that sometimes the patient has a hard time speaking at her baseline, but states that she had a similar episode like this last summer which she just "snapped out of"  She was taken to [facility] at that time, but a work up was not completed due to insufficient staffing  The patient denied any symptoms such as numbness, tingling, or weakness in her arms, legs, or face  She denied dizziness or difficulty swallowing  The patient's daughter reports that she is slurring her speech at this time as well as it appears to her that her left face is drooping some, specifically around her mouth  Denies chest pain, pressure, or palpitations  Denies fevers or chills  Denies abdominal pain, nausea, vomiting, or diarrhea  Daughter reports that she saw her mother earlier in the week and she was doing well, but she did start to notice changes late yesterday       Review of Systems:     Review of Systems   Constitutional: Negative for appetite change, chills, diaphoresis, fatigue and fever  HENT: Negative for congestion, rhinorrhea and sore throat  Eyes: Negative for visual disturbance  Respiratory: Negative for cough, chest tightness, shortness of breath and wheezing  Cardiovascular: Negative for chest pain, palpitations and leg swelling  Gastrointestinal: Negative for abdominal pain, constipation, diarrhea, nausea and vomiting  Genitourinary: Negative for dysuria  Musculoskeletal: Positive for gait problem (Chronic)  Negative for arthralgias and myalgias  Neurological: Positive for speech difficulty  Negative for dizziness, syncope, weakness, light-headedness, numbness and headaches  All other systems reviewed and are negative         Past Medical and Surgical History:      Medical History        Past Medical History:   Diagnosis Date    Anemia      Cancer ([facility] )      Diabetes mellitus ([facility] )      End-stage renal disease ([place] [address] )      Hypertension              Surgical History         Past Surgical History:   Procedure Laterality Date    AV FISTULA PLACEMENT        MASTECTOMY         R arm            Meds/Allergies:             Prior to Admission medications    Medication Sig Start Date End Date Taking?  Authorizing Provider   acetaminophen (TYLENOL) 325 mg tablet Take 650 mg by mouth every 6 (six) hours as needed for mild pain     Yes Historical Provider, MD   albuterol (2 5 mg/3 mL) 0 083 % nebulizer solution Take 2 5 mg by nebulization every 6 (six) hours as needed for wheezing     Yes Historical Provider, MD   albuterol (PROVENTIL HFA,VENTOLIN HFA) 90 mcg/act inhaler Inhale 2 puffs every 6 (six) hours as needed for wheezing     Yes Historical Provider, MD   amLODIPine (NORVASC) 5 mg tablet Take 5 mg by mouth daily     Yes Historical Provider, MD   bisacodyl (DULCOLAX) 5 mg EC tablet Take 5 mg by mouth daily as needed for constipation     Yes Historical Provider, MD   busPIRone (BUSPAR) 5 mg tablet Take 5 mg by mouth 3 (three) times a day     Yes Historical Provider, MD   docusate sodium (COLACE) 100 mg capsule Take 100 mg by mouth 2 (two) times a day     Yes Historical Provider, MD   fentaNYL (DURAGESIC) 25 mcg/hr Place 1 patch on the skin every third day     Yes Historical Provider, MD   ketorolac (ACULAR) 0 4 % SOLN 1 drop 4 (four) times a day     Yes Historical Provider, MD   labetalol (NORMODYNE) 100 mg tablet Take 100 mg by mouth 2 (two) times a day     Yes Historical Provider, MD   nitroglycerin (NITROSTAT) 0 4 mg SL tablet Place 0 4 mg under the tongue every 5 (five) minutes as needed for chest pain     Yes Historical Provider, MD   polyethylene glycol (MIRALAX) 17 g packet Take 17 g by mouth daily     Yes Historical Provider, MD   sertraline (ZOLOFT) 25 mg tablet Take 50 mg by mouth daily     Yes Historical Provider, MD      I have reviewed home medications with patient family member      Allergies: Allergies   Allergen Reactions    Percolone [Oxycodone]      Sulfa Antibiotics           Social History:     Marital Status:    Occupation: None  Patient Pre-hospital Living Situation: SNF  Patient Pre-hospital Level of Mobility: Bedbound mostly  Patient Pre-hospital Diet Restrictions: None  Substance Use History:       History   Alcohol Use No          History   Smoking Status    Never Smoker   Smokeless Tobacco    Not on file          History   Drug Use No         Family History:     History reviewed  No pertinent family history      Physical Exam:      Vitals:   Blood Pressure: 165/73 (01/05/19 1230)  Pulse: 58 (01/05/19 1230)  Temperature: 98 3 °F (36 8 °C) (01/05/19 0945)  Temp Source: Oral (01/05/19 0945)  Respirations: 18 (01/05/19 1121)  Weight - Scale: 84 6 kg (186 lb 8 2 oz) (01/05/19 0903)  SpO2: 99 % (01/05/19 1230)     Physical Exam   Constitutional: Vital signs are normal  She appears well-developed and well-nourished  Non-toxic appearance  No distress  HENT:   Head: Normocephalic and atraumatic  Mouth/Throat: Mucous membranes are not dry  Eyes: Pupils are equal, round, and reactive to light  Conjunctivae and EOM are normal  No scleral icterus  Right pupil is round and reactive  Left pupil is round and reactive  Pupils are equal    Pupils miotic, but symmetrical and reactive     Neck: Neck supple  Cardiovascular: Normal rate, regular rhythm, S1 normal, S2 normal, normal heart sounds and intact distal pulses  Exam reveals no S3 and no S4  No murmur heard  Pulmonary/Chest: Effort normal and breath sounds normal  No accessory muscle usage  No respiratory distress  She has no decreased breath sounds  She has no wheezes  She has no rhonchi  She has no rales  She exhibits no tenderness  Abdominal: Soft  Bowel sounds are normal  She exhibits no distension and no mass  There is no tenderness  There is no rigidity, no rebound and no guarding  Neurological: She is alert  She displays no tremor  A cranial nerve deficit (There is left downward facial droop and left tongue deviation) is present  No sensory deficit  She displays no seizure activity  GCS eye subscore is 4  GCS verbal subscore is 4  GCS motor subscore is 6    4/5 strength in left upper extremity  5/5 in right upper   0/5 in bilateral lower extremity, but this is baseline   Skin: Skin is warm and dry                Additional Data:      Lab Results: I have personally reviewed pertinent reports           Results from last 7 days  Lab Units 01/05/19  0855 01/05/19  0850   WBC Thousand/uL  --   --  7 49   HEMOGLOBIN g/dL  --   --  10 1*   I STAT HEMOGLOBIN g/dl 11 2*  < >  --    HEMATOCRIT %  --   --  32 9*   HEMATOCRIT, ISTAT % 33*  < >  --    PLATELETS Thousands/uL  --   --  182   < > = values in this interval not displayed      Results from last 7 days  Lab Units 01/05/19  0855 01/05/19  0854   01/05/19  0850   SODIUM mmol/L  --   --   --  140   POTASSIUM mmol/L  --   --   --  5 0   CHLORIDE mmol/L  --   --   --  107   CO2 mmol/L  --   --   --  22   CO2, I-STAT mmol/L 23 22  < >  --    BUN mg/dL  --   --   --  51*   CREATININE mg/dL  --   --   --  6 59*   AGAP mmol/L  --  15*  --   --    ANION GAP mmol/L  --   --   --  11   CALCIUM mg/dL  --   --   --  9 5   GLUCOSE RANDOM mg/dL  --   --   --  115   < > = values in this interval not displayed      Results from last 7 days  Lab Units 01/05/19  0851   INR   1 11                     Imaging: I have personally reviewed pertinent reports        XR stroke alert portable chest   Final Result by Nitesh Aguirre DO (01/05 0901)   No acute cardiopulmonary disease                Workstation performed: [id number]           CT stroke alert brain   Final Result by Nitesh Aguirre DO (01/05 0900)   Cerebral atrophy with chronic small vessel ischemic change        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

## 2019-01-05 NOTE — PROGRESS NOTES
I received a call from the dialysis nurse that the patient's blood pressure had been 160 decreased to 130  Patient was admitted with acute CVA  MRI report negative for acute CVA  The patient may been given benzodiazepine  At this point her electrolytes are stable she is not fluid overloaded  Just holding hemodialysis further for right now we did not pull off any fluid with hemodialysis  With refusing his systolic blood pressure is 170  Will re-evaluate patient need for dialysis tomorrow  Patient had been on dialysis for approximately 40 minutes K was 4 9 CO2 22  Chest x-ray not reported to show any acute cardiopulmonary disease   I communicated with Cele Ayala PA-C

## 2019-01-05 NOTE — PROGRESS NOTES
Patient seen in the emergency room earlier this afternoon  I had been called about her arrival by the dialysis unit  When I saw the patient she was being resting comfortably and undergoing a CVA workup  I called the outpatient dialysis unit  The patient did not have dialysis today the nurse at the outpatient dialysis unit had stated the patient seemed to be demonstrating focal weakness  Patient in the midst of evaluation  Patient seen by neurology found have acute left hemispheric CVA  Full consultation to follow  Plan for dialysis 2 hours if not today and tomorrow morning  For consultation to follow once requested

## 2019-01-05 NOTE — ED NOTES
Dr Mcintyre Ba made aware of pts BP, no further orders at this time        Dillon Miramontes, RN  01/05/19 6649

## 2019-01-05 NOTE — ED PROVIDER NOTES
History  Chief Complaint   Patient presents with    Altered Mental Status     pt comes via EMS for [facility] stroke alert  Pt was enroute to dialysis when pt arrived staff noted L sided weakness, aphasic, with L facial droop  Pt able to answer simple one worded questions on arrival       Patient is an 81-year-old female who presents to the emergency department via EMS from her dialysis center  She was reportedly her usual self upon leaving her nursing residence for transport to dialysis  Upon her arrival at 07:55 she was appreciated to have significant difficulties with her speech  It was described that patient was unable to find her words "  EMS relate that staff shared she is normally extremely alert and conversant  EMS noted the patient was minimally verbal and that much of what she attempted saying was unclear  They additionally reported she had a left facial droop  With this information they called while in route to the hospital and pre-hospital stroke alert was called  Patient is able to tell me her name and age  She shakes her head negatively when asked how she was  She was unable to elaborate on how she was feeling though denied having any pain, difficulty breathing or nausea  EMS reported history of 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  Taking?   acetaminophen (TYLENOL) 325 mg tablet  Outside Facility (Specify) Yes Yes   Sig: Take 650 mg by mouth every 6 (six) hours as needed for mild pain   albuterol (2 5 mg/3 mL) 0 083 % nebulizer solution  Outside Facility (Specify) Yes Yes   Sig: Take 2 5 mg by nebulization every 6 (six) hours as needed for wheezing   albuterol (PROVENTIL HFA,VENTOLIN HFA) 90 mcg/act inhaler  Outside Facility (Specify) Yes Yes   Sig: Inhale 2 puffs every 6 (six) hours as needed for wheezing   amLODIPine (NORVASC) 5 mg tablet  Outside Facility (Specify) Yes Yes   Sig: Take 5 mg by mouth daily   bisacodyl (DULCOLAX) 5 mg EC tablet  Outside Facility (Specify) Yes Yes   Sig: Take 5 mg by mouth daily as needed for constipation   busPIRone (BUSPAR) 5 mg tablet  Outside Facility (Specify) Yes Yes   Sig: Take 5 mg by mouth 3 (three) times a day   docusate sodium (COLACE) 100 mg capsule  Outside Facility (Specify) Yes Yes   Sig: Take 100 mg by mouth 2 (two) times a day   fentaNYL (DURAGESIC) 25 mcg/hr  Outside Facility (Specify) Yes Yes   Sig: Place 1 patch on the skin every third day   ketorolac (ACULAR) 0 4 % SOLN  Outside Facility (Specify) Yes Yes   Si drop 4 (four) times a day   labetalol (NORMODYNE) 100 mg tablet  Outside Facility (Specify) Yes Yes   Sig: Take 100 mg by mouth 2 (two) times a day   nitroglycerin (NITROSTAT) 0 4 mg SL tablet  Outside Facility (Specify) Yes Yes   Sig: Place 0 4 mg under the tongue every 5 (five) minutes as needed for chest pain   polyethylene glycol (MIRALAX) 17 g packet  Outside Facility (Specify) Yes Yes   Sig: Take 17 g by mouth daily   sertraline (ZOLOFT) 25 mg tablet  Outside Facility (Specify) Yes Yes   Sig: Take 50 mg by mouth daily      Facility-Administered Medications: None       Past Medical History:   Diagnosis Date    Altered mental status, unspecified     Anemia     Atherosclerotic heart disease of native coronary artery without angina pectoris     Cancer (HCC)     Chest pain     Chronic diastolic (congestive) heart failure (HCC)     Dependence on renal dialysis ([facility] [address] )     Diabetes mellitus (HCC)     End-stage renal disease (HCC)     Gastro-esophageal reflux disease without esophagitis     Hyperlipidemia     Hypertension     Long term current use of anticoagulant     Long term current use of insulin (HCC)     Muscle weakness     Nausea with vomiting     Other abnormalities of gait and mobility     Other specified abnormal findings of blood chemistry     Type 2 diabetes mellitus ([facility] [address] )     Ventral hernia without obstruction or gangrene     Vitamin D deficiency        Past Surgical History:   Procedure Laterality Date    AV FISTULA PLACEMENT      MASTECTOMY      R arm       History reviewed  No pertinent family history  I have reviewed and agree with the history as documented  Social History   Substance Use Topics    Smoking status: Never Smoker    Smokeless tobacco: Not on file    Alcohol use No        Review of Systems   Constitutional: Negative for fever  Respiratory: Negative for shortness of breath  All other systems reviewed and are negative  Physical Exam  Physical Exam   Constitutional: She appears well-developed and well-nourished  Patient alert  She attempts to answer most questions though responses are quite delayed at times  Most speech is clear though she responds in only 1 to 2 words and at times stutters in expressing these  She follows most commands though at times does not respond to requests or questioning  HENT:   Patient with very minimal facial expression  No asymmetry appreciated with speech  Eyes: Conjunctivae and EOM are normal    Cardiovascular: Normal rate and regular rhythm  Pulmonary/Chest: Effort normal and breath sounds normal    Abdominal: Soft  Bowel sounds are normal    Musculoskeletal: Normal range of motion  She exhibits edema (Bilateral lower extremities)  Legs each in specialized pillow cradles  Neurological: She is alert  Oriented to month  Suggested year was 2018  Answered "[facility]" for location  No facial asymmetry/ deficit appreciated though facial expression/movement is minimal   Patient does not raise eyebrows nor respond with any movement when repeatedly questioned to smile, stick out her tongue, show us her teeth as Emmalene Platts  Pupils briskly reactive to light  EOMI  Able to fully close both eyes    Patient with fair strength on bilateral hand   No pronator drift  Patient does not move lower extremities to command  Negative Babinski  Patient with seemingly symmetric sensation in the upper and lower extremities  Sensation appreciated on palpation of the face, upper and lower extremities  At times patient reported sensation on only 1 side when both were being touched though this was without lateralization and not reproducible  See NIHSS   Skin: Skin is warm and dry  Psychiatric: She has a normal mood and affect  Her behavior is normal    Nursing note and vitals reviewed        Vital Signs  ED Triage Vitals   Temperature Pulse Respirations Blood Pressure SpO2   01/05/19 0945 01/05/19 0835 01/05/19 0835 01/05/19 0835 01/05/19 0835   98 3 °F (36 8 °C) 68 18 (!) 187/82 100 %      Temp Source Heart Rate Source Patient Position - Orthostatic VS BP Location FiO2 (%)   01/05/19 0945 01/05/19 0835 01/05/19 0835 01/05/19 0835 --   Oral Monitor Lying Right arm       Pain Score       01/05/19 0835       No Pain           Vitals:    01/07/19 1915 01/07/19 1930 01/07/19 2000 01/07/19 2030   BP: (!) 109/47 125/84 (!) 119/41 125/52   Pulse: 62 83 63 (!) 47   Patient Position - Orthostatic VS:           Visual Acuity  Visual Acuity      Most Recent Value   L Pupil Size (mm)  2   R Pupil Size (mm)  2   L Pupil Shape  Round   R Pupil Shape  Round          ED Medications  Medications   alteplase (ACTIVASE) 100 mg injection **ADS Override Pull** (  Not Given 1/5/19 0958)   acetaminophen (TYLENOL) tablet 650 mg (not administered)   albuterol inhalation solution 2 5 mg (not administered)   amLODIPine (NORVASC) tablet 5 mg (5 mg Oral Given 1/7/19 0943)   busPIRone (BUSPAR) tablet 5 mg (5 mg Oral Not Given 1/7/19 1535)   labetalol (NORMODYNE) tablet 100 mg (100 mg Oral Not Given 1/7/19 1740)   sertraline (ZOLOFT) tablet 50 mg (50 mg Oral Given 1/7/19 0943)   ondansetron (ZOFRAN) injection 4 mg (not administered)   atorvastatin (LIPITOR) tablet 40 mg (40 mg Oral Given 1/7/19 1740)   aspirin tablet 325 mg (325 mg Oral Given 1/7/19 0943)   heparin (porcine) subcutaneous injection 5,000 Units (5,000 Units Subcutaneous Given 1/7/19 1315)   sodium chloride 0 9 % infusion (0 mL/hr Intravenous Stopped 1/6/19 0230)   guaiFENesin (MUCINEX) 12 hr tablet 600 mg (not administered)   aspirin tablet 325 mg (325 mg Oral Given 1/5/19 1035)   LORazepam (ATIVAN) 2 mg/mL injection 1 mg (1 mg Intravenous Given 1/5/19 1540)   albumin human (FLEXBUMIN) 25 % injection 12 5 g (0 g Intravenous Stopped 1/5/19 2000)       Diagnostic Studies  Results Reviewed     Procedure Component Value Units Date/Time    Lipid Panel with Direct LDL reflex [[id number]]  (Abnormal) Collected:  01/06/19 0449    Lab Status:  Final result Specimen:  Blood from Arm, Right Updated:  01/06/19 0537     Cholesterol 168 mg/dL      Triglycerides 69 mg/dL      HDL, Direct 36 (L) mg/dL      LDL Calculated 118 (H) mg/dL     Basic metabolic panel [[id number]]  (Abnormal) Collected:  01/06/19 0449    Lab Status:  Final result Specimen:  Blood from Arm, Right Updated:  01/06/19 0537     Sodium 140 mmol/L      Potassium 4 5 mmol/L      Chloride 107 mmol/L      CO2 22 mmol/L      ANION GAP 11 mmol/L      BUN 44 (H) mg/dL      Creatinine 6 04 (H) mg/dL      Glucose 77 mg/dL      Calcium 9 5 mg/dL      eGFR 7 ml/min/1 73sq m     Narrative:         National Kidney Disease Education Program recommendations are as follows:  GFR calculation is accurate only with a steady state creatinine  Chronic Kidney disease less than 60 ml/min/1 73 sq  meters  Kidney failure less than 15 ml/min/1 73 sq  meters      CBC (With Platelets) [[id number]]  (Abnormal) Collected:  01/06/19 0449    Lab Status:  Final result Specimen:  Blood from Arm, Right Updated:  01/06/19 0517     WBC 5 57 Thousand/uL      RBC 3 22 (L) Million/uL      Hemoglobin 8 9 (L) g/dL      Hematocrit 28 9 (L) %      MCV 90 fL      MCH 27 6 pg      MCHC 30 8 (L) g/dL RDW 16 6 (H) %      Platelets 921 (L) Thousands/uL      MPV 10 8 fL     Hemoglobin A1c w/EAG Estimation [[id number]] Collected:  01/06/19 0449    Lab Status: In process Specimen:  Blood from Arm, Right Updated:  01/06/19 0512    Urinalysis with reflex to microscopic [[id number]]     Lab Status:  No result Specimen:  Urine     APTT [[id number]]  (Normal) Collected:  01/05/19 0851    Lab Status:  Final result Specimen:  Blood from Arm, Right Updated:  01/05/19 0907     PTT 32 seconds     Protime-INR [[id number]]  (Normal) Collected:  01/05/19 0851    Lab Status:  Final result Specimen:  Blood from Arm, Right Updated:  01/05/19 0907     Protime 14 0 seconds      INR 1 92    Basic metabolic panel [[id number]]  (Abnormal) Collected:  01/05/19 0850    Lab Status:  Final result Specimen:  Blood from Arm, Right Updated:  01/05/19 8010     Sodium 140 mmol/L      Potassium 5 0 mmol/L      Chloride 107 mmol/L      CO2 22 mmol/L      ANION GAP 11 mmol/L      BUN 51 (H) mg/dL      Creatinine 6 59 (H) mg/dL      Glucose 115 mg/dL      Calcium 9 5 mg/dL      eGFR 6 ml/min/1 73sq m     Narrative:         National Kidney Disease Education Program recommendations are as follows:  GFR calculation is accurate only with a steady state creatinine  Chronic Kidney disease less than 60 ml/min/1 73 sq  meters  Kidney failure less than 15 ml/min/1 73 sq  meters  POCT troponin [[id number]] Collected:  01/05/19 0852    Lab Status:  Final result Updated:  01/05/19 0905     POC Troponin I 0 02 ng/ml      Specimen Type VENOUS    Narrative:         [employer] i-Stat handheld analyzer 99% cutoff is > 0 08ng/mL in network Emergency Departments    o cTnI 99% cutoff is useful only when applied to patients in the clinical setting of myocardial ischemia  o cTnI 99% cutoff should be interpreted in the context of clinical history, ECG findings and possibly cardiac imaging to establish correct diagnosis    o cTnI 99% cutoff may be suggestive but clearly not indicative of a coronary event without the clinical setting of myocardial ischemia  POCT Blood Gas (CG8+) [[id number]]  (Abnormal) Collected:  01/05/19 0855    Lab Status:  Final result Updated:  01/05/19 0859     ph, Vj ISTAT 7 366     pCO2, Vj i-STAT 38 0 (L) mm HG      pO2, Vj i-STAT 133 0 (H) mm HG      BE, i-STAT -3 (L) mmol/L      HCO3, Vj i-STAT 21 8 (L) mmol/L      CO2, i-STAT 23 mmol/L      O2 Sat, i-STAT 99 (H) %      SODIUM, I-STAT 141 mmol/l      Potassium, i-STAT 5 0 mmol/L      Calcium, Ionized i-STAT 1 20 mmol/L      Hct, i-STAT 33 (L) %      Hgb, i-STAT 11 2 (L) g/dl      Glucose, i-STAT 111 mg/dl      Specimen Type VENOUS    POCT Chem 8+ [[id number]]  (Abnormal) Collected:  01/05/19 0854    Lab Status:  Final result Updated:  01/05/19 0857     SODIUM, I-STAT 141 mmol/l      Potassium, i-STAT 4 9 mmol/L      Chloride, istat 110 (H) mmol/L      CO2, i-STAT 22 mmol/L      Anion Gap, i-STAT 15 (H) mmol/L      Calcium, Ionized i-STAT 1 22 mmol/L      BUN, I-STAT 44 (H) mg/dl      Creatinine, i-STAT 7 1 (H) mg/dl      eGFR 6 ml/min/1 73sq m      Glucose, i-STAT 114 mg/dl      Hct, i-STAT 31 (L) %      Hgb, i-STAT 10 5 (L) g/dl      Specimen Type VENOUS    CBC [[id number]]  (Abnormal) Collected:  01/05/19 0850    Lab Status:  Final result Specimen:  Blood from Arm, Right Updated:  01/05/19 0855     WBC 7 49 Thousand/uL      RBC 3 69 (L) Million/uL      Hemoglobin 10 1 (L) g/dL      Hematocrit 32 9 (L) %      MCV 89 fL      MCH 27 4 pg      MCHC 30 7 (L) g/dL      RDW 16 5 (H) %      Platelets 708 Thousands/uL      MPV 10 1 fL                  MRI brain wo contrast   Final Result by Victor Hugo Mcgovern MD (01/05 1654)         1  No evidence of acute infarct, hemorrhage or mass effect  2   Small, chronic left MCA territory infarct and chronic infarcts in the right cerebellar hemisphere  Advanced microangiopathic disease        Workstation performed: [id number]         XR stroke alert portable chest Final Result by Julio Fisher DO (01/05 0901)   No acute cardiopulmonary disease  Workstation performed: [id number]         CT stroke alert brain   Final Result by Julio Fisher DO (01/05 0900)   Cerebral atrophy with chronic small vessel ischemic change  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  Findings were directly discussed with Aaliyah Roa on 1/5/2019 8:56 AM       Workstation performed: [id number]         FL barium swallow video w speech    (Results Pending)              Procedures  ECG 12 Lead Documentation  Date/Time: 1/5/2019 4:41 PM  Performed by: Bruna Leigh  Authorized by: Bruna Leigh     ECG reviewed by me, the ED Provider: yes    Patient location:  ED and bedside  Rate:     ECG rate:  66    ECG rate assessment: normal    Rhythm:     Rhythm: sinus rhythm    Ectopy:     Ectopy: none    QRS:     QRS axis:  Left  Conduction:     Conduction: normal    ST segments:     ST segments:  Normal  T waves:     T waves: inverted      Inverted:  III and V6             Phone Contacts  ED Phone Contact    ED Course  ED Course as of Jan 07 2157   Sat Jan 05, 2019   2214 Call received by charge nurse prior to patient arrival from Dr Rick Santiago in response to stroke alert  Phone number taken  I just call this number and received a message alone indicating that the voice mailbox was full  Patient reportedly normal and conversant upon leaving nursing center to head to dialysis  Upon arrival to dialysis approximately 755 she was appreciated to have difficulty holding a conversation  Left facial droop was additionally appreciated by staff  On my examination she does have waxing waning dysarthria  She follows some commands though not others and at times appears frustrated upon wanting to express something without verbalized expression  There seems to be a component of both expressive and receptive aphasia    Patient with very limited facial expression  I do not specifically appreciate asymmetry nor does the other staff present in the room with me  Call just received from radiologist   Patient with findings of old left frontal and right cerebellar strokes  0932 Dr Hopkins at the bedside  Patient with clinical improvement during evaluation  1009 Given degree of improvement and lack of lateralizing symptoms decision was made that no tPA should be administered  Patient will be admitted to [facility] for further evaluation  1027 Case further discussed with Dr Hopkins  Patient has leg weakness as previously discussed is chronic  Symptoms are greatly improved though she does not seem to be at her baseline  Differential diagnosis includes but is not limited to microvascular stroke from potential embolic) cardioembolic or atheroembolic) source verses encephalopathy from alternate conditions such as complex partial seizure  Patient will be admitted to [facility] MRI/MRA to occur during hospitalization and EEG to be considered if no acute findings are identified on this study  Blood pressures were initially with systolics in the 721I to 941R  More recently these are in the 180s  Until ischemic CVA is eliminated as possible etiology will permit hypertension and treat systolic pressures only greater than 200  Aspirin has been ordered                      Stroke Assessment     Row Name 01/05/19 0857             NIH Stroke Scale    Interval Baseline      Level of Consciousness (1a ) 0      LOC Questions (1b ) 0      LOC Commands (1c ) 0      Best Gaze (2 ) 0      Visual (3 ) 0      Facial Palsy (4 ) 0      Motor Arm, Left (5a ) 0      Motor Arm, Right (5b ) 0      Motor Leg, Left (6a ) 3      Motor Leg, Right (6b ) 3      Limb Ataxia (7 ) 0      Sensory (8 ) 0      Best Language (9 ) 1      Dysarthria (10 ) 1      Extinction and Inattention (11 ) (Formerly Neglect) 1      Total 9                First Filed Value   TPA Decision  Patient not a TPA candidate  Patient is not a candidate options  -- [Symptoms tremendously improved  Majority of patient's deficits (lower extremity weakness) chronic  Alternate etiology additionally considered ]                        MDM  The patient presented with a condition in which there was a high probability of imminent or life-threatening deterioration, and critical care services (excluding separately billable procedures) totalled 30-74 minutes (30)  Disposition  Final diagnoses:   [place] (NIH) Stroke Scale level of consciousness score 0, alert; keenly responsive   Dysarthria   Aphasia     Time reflects when diagnosis was documented in both MDM as applicable and the Disposition within this note     Time User Action Codes Description Comment    1/5/2019  8:47 AM Yusuf Ann Add [Z78 9] [place] (NIH) Stroke Scale level of consciousness score 0, alert; keenly responsive     1/5/2019  8:47 AM Ceil Boers A Add [R47 1] Dysarthria     1/5/2019  8:48 AM Teodoro Amanda E Remove [R47 1] Dysarthria     1/5/2019  8:49 AM Ceil Boers A Add [R47 1] Dysarthria     1/5/2019 11:04 AM Ceil Boers A Add [R47 01] Aphasia     1/5/2019  1:41 PM Sherlie Finical Add [R47 01] Mixed aphasia     1/5/2019  1:41 PM Sherlie Finical Modify [R47 01] Mixed aphasia     1/5/2019  1:41 PM Sherlie Finical Add [G93 40] Acute encephalopathy     1/5/2019  1:41 PM Sherlie Finical Modify [G93 40] Acute encephalopathy     1/5/2019  5:01 PM Maria Antonia Cork Add [N18 6,  Z99 2] ESRD on hemodialysis ([facility] [address] )     1/5/2019  5:01 PM Maria Antonia Cork Modify [N18 6,  Z99 2] ESRD on hemodialysis [facility])       ED Disposition     ED Disposition Condition Comment    Admit  Case was discussed with Dr Rojas Gutierrez and the patient's admission status was agreed to be Admission Status: inpatient status to the service of Dr Rojas Gutierrez MD Documentation      Most Recent Value   Accepting Facility Name, [address] [place] [address] [facility]   Francesca Escalante      RN Documentation      Most Recent Value   Accepting Facility Name, [address]      Follow-up Information    None         Current Discharge Medication List      CONTINUE these medications which have NOT CHANGED    Details   acetaminophen (TYLENOL) 325 mg tablet Take 650 mg by mouth every 6 (six) hours as needed for mild pain      albuterol (2 5 mg/3 mL) 0 083 % nebulizer solution Take 2 5 mg by nebulization every 6 (six) hours as needed for wheezing      albuterol (PROVENTIL HFA,VENTOLIN HFA) 90 mcg/act inhaler Inhale 2 puffs every 6 (six) hours as needed for wheezing      amLODIPine (NORVASC) 5 mg tablet Take 5 mg by mouth daily      bisacodyl (DULCOLAX) 5 mg EC tablet Take 5 mg by mouth daily as needed for constipation      busPIRone (BUSPAR) 5 mg tablet Take 5 mg by mouth 3 (three) times a day      docusate sodium (COLACE) 100 mg capsule Take 100 mg by mouth 2 (two) times a day      fentaNYL (DURAGESIC) 25 mcg/hr Place 1 patch on the skin every third day      ketorolac (ACULAR) 0 4 % SOLN 1 drop 4 (four) times a day      labetalol (NORMODYNE) 100 mg tablet Take 100 mg by mouth 2 (two) times a day      nitroglycerin (NITROSTAT) 0 4 mg SL tablet Place 0 4 mg under the tongue every 5 (five) minutes as needed for chest pain      polyethylene glycol (MIRALAX) 17 g packet Take 17 g by mouth daily      sertraline (ZOLOFT) 25 mg tablet Take 50 mg by mouth daily           No discharge procedures on file      ED Provider  Electronically Signed by           Bryn Rivas MD  01/07/19 8502

## 2019-01-06 LAB
ANION GAP SERPL CALCULATED.3IONS-SCNC: 11 MMOL/L (ref 4–13)
BUN SERPL-MCNC: 44 MG/DL (ref 5–25)
CALCIUM SERPL-MCNC: 9.5 MG/DL (ref 8.3–10.1)
CHLORIDE SERPL-SCNC: 107 MMOL/L (ref 100–108)
CHOLEST SERPL-MCNC: 168 MG/DL (ref 50–200)
CO2 SERPL-SCNC: 22 MMOL/L (ref 21–32)
CREAT SERPL-MCNC: 6.04 MG/DL (ref 0.6–1.3)
ERYTHROCYTE [DISTWIDTH] IN BLOOD BY AUTOMATED COUNT: 16.6 % (ref 11.6–15.1)
FERRITIN SERPL-MCNC: 660 NG/ML (ref 8–388)
GFR SERPL CREATININE-BSD FRML MDRD: 7 ML/MIN/1.73SQ M
GLUCOSE SERPL-MCNC: 77 MG/DL (ref 65–140)
HCT VFR BLD AUTO: 28.9 % (ref 34.8–46.1)
HDLC SERPL-MCNC: 36 MG/DL (ref 40–60)
HGB BLD-MCNC: 8.9 G/DL (ref 11.5–15.4)
IRON SATN MFR SERPL: 41 %
IRON SERPL-MCNC: 64 UG/DL (ref 50–170)
LDLC SERPL CALC-MCNC: 118 MG/DL (ref 0–100)
MCH RBC QN AUTO: 27.6 PG (ref 26.8–34.3)
MCHC RBC AUTO-ENTMCNC: 30.8 G/DL (ref 31.4–37.4)
MCV RBC AUTO: 90 FL (ref 82–98)
PLATELET # BLD AUTO: 145 THOUSANDS/UL (ref 149–390)
PMV BLD AUTO: 10.8 FL (ref 8.9–12.7)
POTASSIUM SERPL-SCNC: 4.5 MMOL/L (ref 3.5–5.3)
RBC # BLD AUTO: 3.22 MILLION/UL (ref 3.81–5.12)
SODIUM SERPL-SCNC: 140 MMOL/L (ref 136–145)
TIBC SERPL-MCNC: 155 UG/DL (ref 250–450)
TRIGL SERPL-MCNC: 69 MG/DL
WBC # BLD AUTO: 5.57 THOUSAND/UL (ref 4.31–10.16)

## 2019-01-06 PROCEDURE — 99232 SBSQ HOSP IP/OBS MODERATE 35: CPT | Performed by: INTERNAL MEDICINE

## 2019-01-06 PROCEDURE — 83540 ASSAY OF IRON: CPT | Performed by: INTERNAL MEDICINE

## 2019-01-06 PROCEDURE — 80048 BASIC METABOLIC PNL TOTAL CA: CPT | Performed by: PHYSICIAN ASSISTANT

## 2019-01-06 PROCEDURE — 83036 HEMOGLOBIN GLYCOSYLATED A1C: CPT | Performed by: PHYSICIAN ASSISTANT

## 2019-01-06 PROCEDURE — 83550 IRON BINDING TEST: CPT | Performed by: INTERNAL MEDICINE

## 2019-01-06 PROCEDURE — 85027 COMPLETE CBC AUTOMATED: CPT | Performed by: PHYSICIAN ASSISTANT

## 2019-01-06 PROCEDURE — 82728 ASSAY OF FERRITIN: CPT | Performed by: INTERNAL MEDICINE

## 2019-01-06 PROCEDURE — 80061 LIPID PANEL: CPT | Performed by: PHYSICIAN ASSISTANT

## 2019-01-06 RX ADMIN — BUSPIRONE HYDROCHLORIDE 5 MG: 5 TABLET ORAL at 17:08

## 2019-01-06 RX ADMIN — ASPIRIN 325 MG: 325 TABLET ORAL at 09:21

## 2019-01-06 RX ADMIN — HEPARIN SODIUM 5000 UNITS: 5000 INJECTION, SOLUTION INTRAVENOUS; SUBCUTANEOUS at 05:50

## 2019-01-06 RX ADMIN — AMLODIPINE BESYLATE 5 MG: 5 TABLET ORAL at 09:21

## 2019-01-06 RX ADMIN — LABETALOL HYDROCHLORIDE 100 MG: 100 TABLET, FILM COATED ORAL at 17:08

## 2019-01-06 RX ADMIN — HEPARIN SODIUM 5000 UNITS: 5000 INJECTION, SOLUTION INTRAVENOUS; SUBCUTANEOUS at 14:03

## 2019-01-06 RX ADMIN — BUSPIRONE HYDROCHLORIDE 5 MG: 5 TABLET ORAL at 09:21

## 2019-01-06 RX ADMIN — SERTRALINE HYDROCHLORIDE 50 MG: 50 TABLET ORAL at 09:22

## 2019-01-06 RX ADMIN — HEPARIN SODIUM 5000 UNITS: 5000 INJECTION, SOLUTION INTRAVENOUS; SUBCUTANEOUS at 21:42

## 2019-01-06 RX ADMIN — DESMOPRESSIN ACETATE 40 MG: 0.2 TABLET ORAL at 17:08

## 2019-01-06 RX ADMIN — BUSPIRONE HYDROCHLORIDE 5 MG: 5 TABLET ORAL at 20:45

## 2019-01-06 RX ADMIN — LABETALOL HYDROCHLORIDE 100 MG: 100 TABLET, FILM COATED ORAL at 09:22

## 2019-01-06 NOTE — PLAN OF CARE
Problem: DISCHARGE PLANNING - CARE MANAGEMENT  Goal: Discharge to post-acute care or home with appropriate resources  INTERVENTIONS:  - Conduct assessment to determine patient/family and health care team treatment goals, and need for post-acute services based on payer coverage, community resources, and patient preferences, and barriers to discharge  - Address psychosocial, clinical, and financial barriers to discharge as identified in assessment in conjunction with the patient/family and health care team  - Arrange appropriate level of post-acute services according to patients   needs and preference and payer coverage in collaboration with the physician and health care team  - Communicate with and update the patient/family, physician, and health care team regarding progress on the discharge plan  - Arrange appropriate transportation to post-acute venues  Outcome: Progressing  LOS 1, Patient is not a bundle, patient is not a readmission  Patient was originally stroke consult, but MRI brain was negative for CVA  CM spoke with patient's daughter Daiva Goldmann at [phone]  Daughter states that patient is a resident of [facility] in [address], has been there for over a year  Patient gets transferred to dialysis Tuesday, Thursday, Saturday at [facility] via wheelchair [place]  Daughter requests that patient discharges back to [facility] when medically cleared; transportation will be required  Referral placed through [place]  CM reviewed discharge planning process including the following: identifying caregivers at home, preference for d/c planning needs, availability of treatment team to discuss questions or concerns patient and/or family may have regarding diagnosis, plan of care, old or new medications and discharge planning   CM will continue to follow for care coordination and update assessment as necessary

## 2019-01-06 NOTE — SOCIAL WORK
LOS 1, Patient is not a bundle, patient is not a readmission  Patient was originally stroke consult, but MRI brain was negative for CVA  CM spoke with patient's daughter Samreen Odom at [phone]  Daughter states that patient is a resident of [email or web address] in [address], has been there for over a year  Patient gets transferred to dialysis Tuesday, Thursday, Saturday at [facility] via wheelchair [facility]  Daughter requests that patient discharges back to [email or web address] when medically cleared; transportation will be required  Referral placed through [facility]  CM reviewed discharge planning process including the following: identifying caregivers at home, preference for d/c planning needs, availability of treatment team to discuss questions or concerns patient and/or family may have regarding diagnosis, plan of care, old or new medications and discharge planning   CM will continue to follow for care coordination and update assessment as necessary

## 2019-01-06 NOTE — PROGRESS NOTES
[facility] Internal Medicine  Progress Note - Isabela Rothman 1935, 81 y o  female MRN: [id number]    Unit/Bed#: -01 Encounter: [id number]    Primary Care Provider: Catherine Newell MD   Date and time admitted to hospital: 1/5/2019  8:35 AM        * Mixed aphasia   Assessment & Plan    · POA, improved  MRI negative for CVA  ? Epileptic event  ?Dehydration given below dry weight  Neurology recommendations noted and appreciated  · Discontinue stroke pathway   · Check EEG   · Neuro checks      Acute encephalopathy   Assessment & Plan    · POA, patient's mental status is improving and appears to be at baseline  I believe that the patient's functional status is overall low  Her MRI was negative for acute infarction  Daughter reports that this happened earlier this summer and she "snapped out of it"  · Management as per primary   · Monitor for infectious process   · Encourage PO intake  · Speech consult      ESRD on hemodialysis [facility])   Assessment & Plan    · Gets HD on TThS  She missed HD today   · Discussed with nephrology   · HD tomorrow      Essential hypertension   Assessment & Plan    · BP elevated on admission, but has improved  No need for permissive hypertension   · Continue Norvasc, Labetalol         VTE Pharmacologic Prophylaxis:   Pharmacologic: Heparin  Mechanical VTE Prophylaxis in Place: Yes    Patient Centered Rounds: I have performed bedside rounds with nursing staff today  Discussions with Specialists or Other Care Team Provider: Discussed with nephrology, RN, CM    Education and Discussions with Family / Patient: Discussed with daughter     Time Spent for Care: 30 minutes  More than 50% of total time spent on counseling and coordination of care as described above      Current Length of Stay: 1 day(s)    Current Patient Status: Inpatient   Certification Statement: The patient will continue to require additional inpatient hospital stay due to on going neurologic work up and HD tomorrow Discharge Plan: Pending EEG results, ?tomorrow/Tuesday     Code Status: Level 3 - DNAR and DNI      Subjective:   Patient reports that she is doing ok today  Denies pain, fevers, or chills  She denies having a hard time understanding what I am saying  Speech appears improved, but she is still slow to respond which appears baseline  Objective:     Vitals:   Temp (24hrs), Av 8 °F (36 6 °C), Min:97 5 °F (36 4 °C), Max:98 3 °F (36 8 °C)    Temp:  [97 5 °F (36 4 °C)-98 3 °F (36 8 °C)] 98 3 °F (36 8 °C)  HR:  [58-72] 63  Resp:  [18-20] 20  BP: (134-197)/(56-75) 137/61  SpO2:  [96 %-100 %] 100 %  Body mass index is 33 04 kg/m²  Input and Output Summary (last 24 hours): Intake/Output Summary (Last 24 hours) at 19 1137  Last data filed at 19 0941   Gross per 24 hour   Intake              650 ml   Output              230 ml   Net              420 ml       Physical Exam:     Physical Exam   Constitutional: Vital signs are normal  She appears well-developed and well-nourished  Non-toxic appearance  No distress  HENT:   Head: Normocephalic and atraumatic  Mouth/Throat: Mucous membranes are dry  Eyes: Pupils are equal, round, and reactive to light  Conjunctivae and EOM are normal  Pupils are equal    Neck: Neck supple  Cardiovascular: Normal rate, regular rhythm, S1 normal, S2 normal, normal heart sounds and intact distal pulses  Exam reveals no S3 and no S4  No murmur heard  Pulmonary/Chest: Effort normal and breath sounds normal  No accessory muscle usage  No respiratory distress  She has no decreased breath sounds  She has no wheezes  She has no rhonchi  She has no rales  She exhibits no tenderness  Abdominal: Soft  Bowel sounds are normal  She exhibits no distension and no mass  There is no tenderness  There is no rigidity, no rebound and no guarding  Neurological: She is alert  She displays no tremor  No cranial nerve deficit or sensory deficit   She displays no seizure activity  GCS eye subscore is 4  GCS verbal subscore is 5  GCS motor subscore is 6  Strength appears more symmetrical in the upper extremities  Continues to have 0/5 strength in the lower extremities but this is baseline    Skin: Skin is warm and dry  Additional Data:     Labs:      Results from last 7 days  Lab Units 01/06/19  0449   WBC Thousand/uL 5 57   HEMOGLOBIN g/dL 8 9*   HEMATOCRIT % 28 9*   PLATELETS Thousands/uL 145*       Results from last 7 days  Lab Units 01/06/19  0449 01/05/19  0855   POTASSIUM mmol/L 4 5  --    CHLORIDE mmol/L 107  --    CO2 mmol/L 22  --    CO2, I-STAT mmol/L  --  23   BUN mg/dL 44*  --    CREATININE mg/dL 6 04*  --    CALCIUM mg/dL 9 5  --    GLUCOSE, ISTAT mg/dl  --  111       Results from last 7 days  Lab Units 01/05/19  0851   INR  1 11       * I Have Reviewed All Lab Data Listed Above  * Additional Pertinent Lab Tests Reviewed: Gloria 66 Admission Reviewed    Imaging:    Imaging Reports Reviewed Today Include: MRI - negative for acute infarction   Imaging Personally Reviewed by Myself Includes:  None    Recent Cultures (last 7 days):           Last 24 Hours Medication List:     Current Facility-Administered Medications:  acetaminophen 650 mg Oral Q6H PRN Andi Melchor PA-C   albuterol 2 5 mg Nebulization Q6H PRN MONISHA Burns-LUIS DANIEL   amLODIPine 5 mg Oral Daily MONISHA Marroquin-C   aspirin 325 mg Oral Daily MONISHA Marroquin-C   atorvastatin 40 mg Oral QPM MONISHA Burns-LUIS DANIEL   busPIRone 5 mg Oral TID MONISHA Marroquin-LUIS DANIEL   heparin (porcine) 5,000 Units Subcutaneous Q8H [facility] Lucio Velasquez PA-C   labetalol 100 mg Oral BID MONISHA Burns-LUIS DANIEL   ondansetron 4 mg Intravenous Q6H PRN Lucio Castellon, PA-LUIS DANIEL   sertraline 50 mg Oral Daily Lucio Castellon PA-C        Today, Patient Was Seen By: Andi Melchor PA-C    ** Please Note: Dictation voice to text software may have been used in the creation of this document   **

## 2019-01-06 NOTE — SPEECH THERAPY NOTE
Speech Language/Pathology  Pt evaluated yesterday & diet rec'd  Today nsg reports to SLP that the pt is coughing w/ all po offered  SLP not in building at the time  Pt will remain NPO until re eval in the am by 1* SLP

## 2019-01-06 NOTE — PROGRESS NOTES
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:  Hemoglobin is 8 9, was 10 1 yesterday, check CBC in a m  Check iron studies, given that this patient is not found to have an acute CVA, will call outpatient dialysis tomorrow to see when last dose of micera was given      Plan: For short hemodialysis treatment tomorrow given short treatment on Saturday and likely back on regular schedule starting Tuesday  Subjective:   Patient seen in follow-up for end-stage renal disease on hemodialysis  Patient only had 40 minutes of hemodialysis yesterday was stopped as there was concern given lower blood pressure and cerebral perfusion in the setting of a possible acute CVA though MRI did not show any evidence of acute CVA  I spoke with [facility], patient received intravenous albumin and intravenous fluid as there was concern that decreased blood pressure may have affected mental status yesterday  Stroke pathway had been discontinue based on MRI results  Patient is currently watching television is able to respond to questions  She denies any chest pressure shortness of breath  I do not know this patient's baseline  Objective:     Vitals: Blood pressure 137/61, pulse 63, temperature 98 3 °F (36 8 °C), temperature source Oral, resp  rate 20, weight 84 6 kg (186 lb 8 2 oz), SpO2 100 %  ,Body mass index is 33 04 kg/m²      Weight (last 2 days)     Date/Time   Weight    01/05/19 0903  84 6 (186 51)                Intake/Output Summary (Last 24 hours) at 01/06/19 1007  Last data filed at 01/06/19 0941   Gross per 24 hour   Intake              650 ml   Output              230 ml   Net              420 ml       Permanent HD Catheter  (Active)   Line Necessity Reviewed Yes, reviewed with provider 1/5/2019  6:20 PM   Site Assessment Clean;Dry; Intact 1/5/2019  6:20 PM   Proximal Lumen (Red Port-PICC only) Status Capped;Normal saline locked 1/5/2019  6:20 PM   Distal Lumen (Be Port-PICC only) Status Capped;Normal saline locked 1/5/2019  6:20 PM   Dressing Type Chlorhexidine dressing 1/5/2019  6:20 PM   Dressing Status Clean;Dry; Intact 1/5/2019  6:20 PM   Dressing Intervention Dressing changed 1/5/2019  5:15 PM   Dressing Change Due 01/12/19 1/5/2019  6:20 PM       Physical Exam: General: patient is in NAD  Skin:  No new rash  Eyes:  No scleral icterus  Neck:  Supple no adenopathy  Chest:  Coarse breath sounds  CVS:  S1-S2  Abdomen:  Positive bowel sounds  Extremities:  No significant edema  Neuro:  Nonfocal                Prescriptions Prior to Admission   Medication    acetaminophen (TYLENOL) 325 mg tablet    albuterol (2 5 mg/3 mL) 0 083 % nebulizer solution    albuterol (PROVENTIL HFA,VENTOLIN HFA) 90 mcg/act inhaler    amLODIPine (NORVASC) 5 mg tablet    bisacodyl (DULCOLAX) 5 mg EC tablet    busPIRone (BUSPAR) 5 mg tablet    docusate sodium (COLACE) 100 mg capsule    fentaNYL (DURAGESIC) 25 mcg/hr    ketorolac (ACULAR) 0 4 % SOLN    labetalol (NORMODYNE) 100 mg tablet    nitroglycerin (NITROSTAT) 0 4 mg SL tablet    polyethylene glycol (MIRALAX) 17 g packet    sertraline (ZOLOFT) 25 mg tablet       Current Facility-Administered Medications   Medication Dose Route Frequency    acetaminophen (TYLENOL) tablet 650 mg  650 mg Oral Q6H PRN    albuterol inhalation solution 2 5 mg  2 5 mg Nebulization Q6H PRN    amLODIPine (NORVASC) tablet 5 mg  5 mg Oral Daily    aspirin tablet 325 mg  325 mg Oral Daily    atorvastatin (LIPITOR) tablet 40 mg  40 mg Oral QPM    busPIRone (BUSPAR) tablet 5 mg  5 mg Oral TID    heparin (porcine) subcutaneous injection 5,000 Units  5,000 Units Subcutaneous Q8H [address]    labetalol (NORMODYNE) tablet 100 mg  100 mg Oral BID    ondansetron (ZOFRAN) injection 4 mg  4 mg Intravenous Q6H PRN    sertraline (ZOLOFT) tablet 50 mg  50 mg Oral Daily        Lab, Imaging and other studies: I have personally reviewed pertinent labs    CBC: Lab Results   Component Value Date    WBC 5 57 01/06/2019    RBC 3 22 (L) 01/06/2019     CMP: Lab Results   Component Value Date     01/06/2019    CO2 22 01/06/2019    CO2 23 01/05/2019    BUN 44 (H) 01/06/2019    CREATININE 6 04 (H) 01/06/2019    GLUCOSE 111 01/05/2019    CALCIUM 9 5 01/06/2019    EGFR 7 01/06/2019    EGFR 6 01/05/2019     Phosphorus: No results found for: PHOS  Magnesium: No results found for: MG  Urinalysis: No results found for: COLORU, CLARITYU, SPECGRAV, PHUR, LEUKOCYTESUR, NITRITE, PROTEINUA, GLUCOSEU, KETONESU, BILIRUBINUR, BLOODU  BMP: Lab Results   Component Value Date    GLUCOSE 111 01/05/2019    SODIUM 140 01/06/2019    CHLORIDE 110 (H) 01/05/2019    CO2 22 01/06/2019    CO2 23 01/05/2019    BUN 44 (H) 01/06/2019    CREATININE 6 04 (H) 01/06/2019    CALCIUM 9 5 01/06/2019

## 2019-01-07 ENCOUNTER — APPOINTMENT (INPATIENT)
Dept: DIALYSIS | Facility: HOSPITAL | Age: 84
DRG: 070 | End: 2019-01-07
Attending: INTERNAL MEDICINE
Payer: MEDICARE

## 2019-01-07 ENCOUNTER — APPOINTMENT (INPATIENT)
Dept: NEUROLOGY | Facility: HOSPITAL | Age: 84
DRG: 070 | End: 2019-01-07
Payer: MEDICARE

## 2019-01-07 LAB
BASOPHILS # BLD AUTO: 0.03 THOUSANDS/ΜL (ref 0–0.1)
BASOPHILS NFR BLD AUTO: 1 % (ref 0–1)
EOSINOPHIL # BLD AUTO: 0.46 THOUSAND/ΜL (ref 0–0.61)
EOSINOPHIL NFR BLD AUTO: 8 % (ref 0–6)
ERYTHROCYTE [DISTWIDTH] IN BLOOD BY AUTOMATED COUNT: 16.3 % (ref 11.6–15.1)
HCT VFR BLD AUTO: 31.2 % (ref 34.8–46.1)
HGB BLD-MCNC: 9.6 G/DL (ref 11.5–15.4)
IMM GRANULOCYTES # BLD AUTO: 0.02 THOUSAND/UL (ref 0–0.2)
IMM GRANULOCYTES NFR BLD AUTO: 0 % (ref 0–2)
LYMPHOCYTES # BLD AUTO: 1.48 THOUSANDS/ΜL (ref 0.6–4.47)
LYMPHOCYTES NFR BLD AUTO: 26 % (ref 14–44)
MCH RBC QN AUTO: 27.5 PG (ref 26.8–34.3)
MCHC RBC AUTO-ENTMCNC: 30.8 G/DL (ref 31.4–37.4)
MCV RBC AUTO: 89 FL (ref 82–98)
MONOCYTES # BLD AUTO: 0.51 THOUSAND/ΜL (ref 0.17–1.22)
MONOCYTES NFR BLD AUTO: 9 % (ref 4–12)
NEUTROPHILS # BLD AUTO: 3.19 THOUSANDS/ΜL (ref 1.85–7.62)
NEUTS SEG NFR BLD AUTO: 56 % (ref 43–75)
NRBC BLD AUTO-RTO: 0 /100 WBCS
PLATELET # BLD AUTO: 171 THOUSANDS/UL (ref 149–390)
PMV BLD AUTO: 11.6 FL (ref 8.9–12.7)
RBC # BLD AUTO: 3.49 MILLION/UL (ref 3.81–5.12)
WBC # BLD AUTO: 5.69 THOUSAND/UL (ref 4.31–10.16)

## 2019-01-07 PROCEDURE — 99232 SBSQ HOSP IP/OBS MODERATE 35: CPT | Performed by: PHYSICIAN ASSISTANT

## 2019-01-07 PROCEDURE — 95816 EEG AWAKE AND DROWSY: CPT

## 2019-01-07 PROCEDURE — 95819 EEG AWAKE AND ASLEEP: CPT | Performed by: PSYCHIATRY & NEUROLOGY

## 2019-01-07 PROCEDURE — 99232 SBSQ HOSP IP/OBS MODERATE 35: CPT | Performed by: INTERNAL MEDICINE

## 2019-01-07 PROCEDURE — 85025 COMPLETE CBC W/AUTO DIFF WBC: CPT | Performed by: INTERNAL MEDICINE

## 2019-01-07 PROCEDURE — 92526 ORAL FUNCTION THERAPY: CPT

## 2019-01-07 RX ORDER — GUAIFENESIN 600 MG
600 TABLET, EXTENDED RELEASE 12 HR ORAL EVERY 12 HOURS SCHEDULED
Status: DISCONTINUED | OUTPATIENT
Start: 2019-01-07 | End: 2019-01-08 | Stop reason: HOSPADM

## 2019-01-07 RX ADMIN — ASPIRIN 325 MG: 325 TABLET ORAL at 09:43

## 2019-01-07 RX ADMIN — AMLODIPINE BESYLATE 5 MG: 5 TABLET ORAL at 09:43

## 2019-01-07 RX ADMIN — HEPARIN SODIUM 5000 UNITS: 5000 INJECTION, SOLUTION INTRAVENOUS; SUBCUTANEOUS at 22:11

## 2019-01-07 RX ADMIN — DESMOPRESSIN ACETATE 40 MG: 0.2 TABLET ORAL at 17:40

## 2019-01-07 RX ADMIN — GUAIFENESIN 600 MG: 600 TABLET, EXTENDED RELEASE ORAL at 22:11

## 2019-01-07 RX ADMIN — HEPARIN SODIUM 5000 UNITS: 5000 INJECTION, SOLUTION INTRAVENOUS; SUBCUTANEOUS at 06:07

## 2019-01-07 RX ADMIN — BUSPIRONE HYDROCHLORIDE 5 MG: 5 TABLET ORAL at 09:43

## 2019-01-07 RX ADMIN — SERTRALINE HYDROCHLORIDE 50 MG: 50 TABLET ORAL at 09:43

## 2019-01-07 RX ADMIN — BUSPIRONE HYDROCHLORIDE 5 MG: 5 TABLET ORAL at 22:12

## 2019-01-07 RX ADMIN — HEPARIN SODIUM 5000 UNITS: 5000 INJECTION, SOLUTION INTRAVENOUS; SUBCUTANEOUS at 13:15

## 2019-01-07 RX ADMIN — LABETALOL HYDROCHLORIDE 100 MG: 100 TABLET, FILM COATED ORAL at 09:43

## 2019-01-07 NOTE — PROGRESS NOTES
Progress Note - Isai De Leon 1935, 81 y o  female MRN: [id number]    Unit/Bed#: -01 Encounter: [id number]    Primary Care Provider: Mirian Mata MD   Date and time admitted to hospital: 1/5/2019  8:35 AM    Acute encephalopathy   Assessment & Plan    · POA, patient's mental status is improving and appears to be at baseline  I believe that the patient's functional status is overall low  Her MRI was negative for acute infarction  Daughter reports that this happened earlier this summer and she "snapped out of it"  He daughter states that today her affect is flat and she's having new difficulty swallowing   · Management as per primary   · Encourage PO intake-- speech has downgraded diet and recommend VBS for further evaluation of new dysphagia   · Speech consult      Essential hypertension   Assessment & Plan    · BP elevated on admission, but has improved  No need for permissive hypertension   · Continue Norvasc, Labetalol       ESRD on hemodialysis [facility])   Assessment & Plan    · Gets HD on TThS  She missed HD today   · Discussed with nephrology   · Short session today; resume regular schedule tomorrow      * Mixed aphasia   Assessment & Plan    · POA, improved  MRI negative for CVA  ? Epileptic event  ?Dehydration given below dry weight  Neurology recommendations noted and appreciated  Daughter reports today that her affect is flat and she isn't as responsive as usual   · Discontinue stroke pathway   · Check EEG-- results pending  · Neuro checks   · Monitor for improvement of mental status with HD       VTE Pharmacologic Prophylaxis:   Pharmacologic: Heparin  Mechanical VTE Prophylaxis in Place: Yes    Patient Centered Rounds: I have performed bedside rounds with nursing staff today  Discussions with Specialists or Other Care Team Provider: CM, nephro    Education and Discussions with Family / Patient: patient, daughter at bedside     Time Spent for Care: 30 minutes    More than 50% of total time spent on counseling and coordination of care as described above  Current Length of Stay: 2 day(s)    Current Patient Status: Inpatient   Certification Statement: The patient will continue to require additional inpatient hospital stay due to ongoing evaluation of neurologic status    Discharge Plan: d/c back to [facility] when medically stable-- likely tomorrow     Code Status: Level 3 - DNAR and DNI      Subjective:   Patient feels well  No complaints  Family and nursing noting increased coughing with food of all consistencies  Objective:     Vitals:   Temp (24hrs), Av 5 °F (36 9 °C), Min:98 3 °F (36 8 °C), Max:98 9 °F (37 2 °C)    Temp:  [98 3 °F (36 8 °C)-98 9 °F (37 2 °C)] 98 3 °F (36 8 °C)  HR:  [57-67] 67  Resp:  [15-16] 16  BP: (135-152)/(59-67) 147/64  SpO2:  [97 %-100 %] 100 %  Body mass index is 33 04 kg/m²  Input and Output Summary (last 24 hours): Intake/Output Summary (Last 24 hours) at 19 1223  Last data filed at 19 0734   Gross per 24 hour   Intake              238 ml   Output                0 ml   Net              238 ml       Physical Exam:     Physical Exam   HENT:   Head: Normocephalic and atraumatic  Eyes: Pupils are equal, round, and reactive to light  Conjunctivae and EOM are normal  No scleral icterus  Neck: No JVD present  Cardiovascular: Normal rate and regular rhythm  Exam reveals no gallop and no friction rub  No murmur heard  Pulmonary/Chest: Effort normal and breath sounds normal  No respiratory distress  She has no wheezes  She has no rales  Abdominal: Soft  Bowel sounds are normal  She exhibits no distension  There is no tenderness  There is no rebound  Musculoskeletal: She exhibits no edema  Neurological: She is alert  She displays normal reflexes  No cranial nerve deficit  She exhibits normal muscle tone  Speech is clear   Skin: Skin is warm and dry  No rash noted  No erythema     Psychiatric:   Affect flat       Additional Data: Labs:      Results from last 7 days  Lab Units 01/07/19  0619   WBC Thousand/uL 5 69   HEMOGLOBIN g/dL 9 6*   HEMATOCRIT % 31 2*   PLATELETS Thousands/uL 171   NEUTROS PCT % 56   LYMPHS PCT % 26   MONOS PCT % 9   EOS PCT % 8*       Results from last 7 days  Lab Units 01/06/19  0449   SODIUM mmol/L 140   POTASSIUM mmol/L 4 5   CHLORIDE mmol/L 107   CO2 mmol/L 22   BUN mg/dL 44*   CREATININE mg/dL 6 04*   ANION GAP mmol/L 11   CALCIUM mg/dL 9 5   GLUCOSE RANDOM mg/dL 77       Results from last 7 days  Lab Units 01/05/19  0851   INR  1 11                       * I Have Reviewed All Lab Data Listed Above  * Additional Pertinent Lab Tests Reviewed: Gloria 66 Admission Reviewed    Imaging:    Imaging Reports Reviewed Today Include: MRI brain  Imaging Personally Reviewed by Myself Includes:  MRI brain     Recent Cultures (last 7 days):           Last 24 Hours Medication List:     Current Facility-Administered Medications:  acetaminophen 650 mg Oral Q6H PRN Andi Melchor PA-C   albuterol 2 5 mg Nebulization Q6H PRN Lucio Velasquez PA-C   amLODIPine 5 mg Oral Daily MONISHA Marroquin-LUIS DANIEL   aspirin 325 mg Oral Daily MONISHA Marroquin-LUIS DANIEL   atorvastatin 40 mg Oral QPM Lucio Velasquez PA-C   busPIRone 5 mg Oral TID Lucio Castellon PA-C   heparin (porcine) 5,000 Units Subcutaneous Q8H [address] Luciolópez Velasquez PA-C   labetalol 100 mg Oral BID Lucio Velasquez PA-C   ondansetron 4 mg Intravenous Q6H PRN MONISHA Marroquin-LUIS DANIEL   sertraline 50 mg Oral Daily Lucio Castellon PA-C        Today, Patient Was Seen By: Kevin Dang PA-C    ** Please Note: Dictation voice to text software may have been used in the creation of this document   **

## 2019-01-07 NOTE — PROGRESS NOTES
NEPHROLOGY PROGRESS NOTE   Piero Nuñez 81 y o  female MRN: [id number]  Unit/Bed#: -01 Encounter: [id number]    ASSESSMENT & PLAN:  52-year-old female end-stage renal disease on hemodialysis presented with aphasia  MRI was negative for CVA  Patient was seen by Neurology  Aphasia improved    ESRD on hemodialysis  -gets hemodialysis TTS at [facility]   Dry weight 82 5 kg  -electrolytes were stable yesterday on labs  -patient had short treatment on Saturday, plan for repeat hemodialysis today for 2 and 0 5 hours for providing clearance  Will repeat hemodialysis again tomorrow for TTS schedule   -access-left IJ PermCath  Anemia due to chronic kidney disease on dialysis  -hemoglobin 9 6  Hemoglobin is below goal but overall stable  -reviewed outpatient record  Patient receives Dinorah as outpatient - last dose on 12/31 50 mcg  Hypertension and chronic kidney disease  -blood pressure acceptable  Continue home antihypertensive medication  Patient is on amlodipine and labetalol  CKD/MBD  -check phosphorus level tomorrow morning  Continue renal diet  Patient is not on any binders as per home medication list     Aphasia/Encephalopathy: Resolving  Also see if mental status will improve further with dialysis treatment today  Will discuss with primary team      SUBJECTIVE:  No shortness of breath  Complaining of soreness in both lower extremities  No aphasia    OBJECTIVE:  Current Weight: Weight - Scale: 84 6 kg (186 lb 8 2 oz)  Vitals:    01/07/19 0734   BP: 150/65   Pulse: 57   Resp: 16   Temp: 98 3 °F (36 8 °C)   SpO2: 97%       Intake/Output Summary (Last 24 hours) at 01/07/19 0811  Last data filed at 01/07/19 0734   Gross per 24 hour   Intake              288 ml   Output                0 ml   Net              288 ml       Physical Exam  General:  Ill looking, awake    Eyes: Conjunctivae pink,  Sclera anicteric  ENT: lips and mucous membranes moist  Neck: supple   Chest:  Bilateral basal coarse crackles  PermCath left IJ  CVS: S1 & S2 present, normal rate, regular rhythm, no murmur    Abdomen: soft, non-tender, non-distended, Bowel sounds normoactive  Extremities: no edema of  legs  Skin: no rash  Neuro: awake, alert, oriented x2      Medications:    Current Facility-Administered Medications:     acetaminophen (TYLENOL) tablet 650 mg, 650 mg, Oral, Q6H PRN, Clarke Simmons PA-C    albuterol inhalation solution 2 5 mg, 2 5 mg, Nebulization, Q6H PRN, Lucio Velasquez PA-C    amLODIPine (NORVASC) tablet 5 mg, 5 mg, Oral, Daily, Lucio Velasquez, PA-C, 5 mg at 01/06/19 8596    aspirin tablet 325 mg, 325 mg, Oral, Daily, Lucio Velasquez, PA-C, 325 mg at 01/06/19 7228    atorvastatin (LIPITOR) tablet 40 mg, 40 mg, Oral, QPM, Lucio Velasquez, PA-C, 40 mg at 01/06/19 1708    busPIRone (BUSPAR) tablet 5 mg, 5 mg, Oral, TID, Lucio Velasquez, PA-C, 5 mg at 01/06/19 2045    heparin (porcine) subcutaneous injection 5,000 Units, 5,000 Units, Subcutaneous, Q8H [facility], 5,000 Units at 01/07/19 0607 **AND** [CANCELED] Platelet count, , , Once, MONISHA Burns-LUIS DANIEL    labetalol (NORMODYNE) tablet 100 mg, 100 mg, Oral, BID, Lucio Velasquez, PA-C, 100 mg at 01/06/19 1708    ondansetron (ZOFRAN) injection 4 mg, 4 mg, Intravenous, Q6H PRN, Lucio Jackson PA-C    sertraline (ZOLOFT) tablet 50 mg, 50 mg, Oral, Daily, Lucio Velasquez, PA-C, 50 mg at 01/06/19 9142    Invasive Devices:        Lab Results:     Results from last 7 days  Lab Units 01/07/19  0619 01/06/19  0449 01/05/19  0855 01/05/19  0854  01/05/19  0850   WBC Thousand/uL 5 69 5 57  --   --   --  7 49   HEMOGLOBIN g/dL 9 6* 8 9*  --   --   --  10 1*   I STAT HEMOGLOBIN g/dl  --   --  11 2* 10 5*  < >  --    HEMATOCRIT % 31 2* 28 9*  --   --   --  32 9*   HEMATOCRIT, ISTAT %  --   --  33* 31*  < >  --    PLATELETS Thousands/uL 171 145*  --   --   --  182   POTASSIUM mmol/L  --  4 5  --   --   --  5 0   CHLORIDE mmol/L  --  107  --   --   --  107   CO2 mmol/L  --  22  -- --   --  22   CO2, I-STAT mmol/L  --   --  23 22  --   --    BUN mg/dL  --  44*  --   --   --  51*   CREATININE mg/dL  --  6 04*  --   --   --  6 59*   CALCIUM mg/dL  --  9 5  --   --   --  9 5   GLUCOSE, ISTAT mg/dl  --   --  111 114  --   --    < > = values in this interval not displayed  Previous work up:     IMPRESSION:   MRI brain     1  No evidence of acute infarct, hemorrhage or mass effect  2   Small, 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

## 2019-01-07 NOTE — UTILIZATION REVIEW
Initial Clinical Review    Admission: Date/Time/Statement: 1/5/19 @ 1106   Orders Placed This Encounter   Procedures    Inpatient Admission (expected length of stay for this patient is greater than two midnights)     Standing Status:   Standing     Number of Occurrences:   1     Order Specific Question:   Admitting Physician     Answer:   John Romero [[id number]]     Order Specific Question:   Level of Care     Answer:   Med Surg [[id number]]     Order Specific Question:   Estimated length of stay     Answer:   More than 2 Midnights     Order Specific Question:   Certification     Answer:   I certify that inpatient services are medically necessary for this patient for a duration of greater than two midnights  See H&P and MD Progress Notes for additional information about the patient's course of treatment  ED: Date/Time/Mode of Arrival:   ED Arrival Information     Expected Arrival Acuity Means of Arrival Escorted By Service Admission Type    - 1/5/2019 08:35 [facility] [address] Emergency    Arrival Complaint    Stroke Alert          Chief Complaint:   Chief Complaint   Patient presents with    Altered Mental Status     pt comes via EMS for [facility] stroke alert  Pt was enroute to dialysis when pt arrived staff noted L sided weakness, aphasic, with L facial droop  Pt able to answer simple one worded questions on arrival         History of Illness:   Sheron Franks a 81 y  o  female with a history of ESRD on HD, HTN who presents with aphasia  The patient's daughter reports that she was transported for hemodialysis and when she got to hemodialysis she was behaving different  The patient is able to state that she was unable to say a person's name at dialysis and that she was having a hard time understanding what other people were saying   The patient's daughter reports that sometimes the patient has a hard time speaking at her baseline, but states that she had a similar episode like this last summer which she just "snapped out of"  She was taken to [facility] at that time, but a work up was not completed due to insufficient staffing  The patient denied any symptoms such as numbness, tingling, or weakness in her arms, legs, or face  She denied dizziness or difficulty swallowing  The patient's daughter reports that she is slurring her speech at this time as well as it appears to her that her left face is drooping some, specifically around her mouth  ED Vital Signs:   ED Triage Vitals   Temperature Pulse Respirations Blood Pressure SpO2   19 0945 19 0835 19 0835 19 0835 19 0835   98 3 °F (36 8 °C) 68 18 (!) 187/82 100 %      Temp Source Heart Rate Source Patient Position - Orthostatic VS BP Location FiO2 (%)   19 0945 19 0835 19 0835 19 0835 --   Oral Monitor Lying Right arm       Pain Score       19 0835       No Pain        Wt Readings from Last 1 Encounters:   19 84 6 kg (186 lb 8 2 oz)     Abnormal Labs/Diagnostic Test Results:   WBC 7 49  Hgl 10 1 / Hct 32 9  Platelets 665    ChestX:No acute cardiopulmonary disease  CT brain:  Cerebral atrophy with chronic small vessel ischemic change        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       EC, NSR    ED Treatment:   Medication Administration from 2019 0835 to 2019 1657       Date/Time Order Dose Route Action Action by Comments     2019 0958 alteplase (ACTIVASE) 100 mg injection **ADS Override Pull**    Not Given Caty Mixon RN orderd to mix, ordered to held     2019 1035 aspirin tablet 325 mg 325 mg Oral Given Caty Mixon RN      2019 1456 heparin (porcine) subcutaneous injection 5,000 Units 5,000 Units Subcutaneous Given Caty Mixon RN      2019 1540 LORazepam (ATIVAN) 2 mg/mL injection 1 mg 1 mg Intravenous Given Caty Mixon RN           Past Medical/Surgical History: Active Ambulatory Problems     Diagnosis Date Noted    Aphasia      Past Medical History:   Diagnosis Date    Altered mental status, unspecified     Anemia     Atherosclerotic heart disease of native coronary artery without angina pectoris     Cancer (HCC)     Chest pain     Chronic diastolic (congestive) heart failure (HCC)     [facility]     Diabetes mellitus [facility] )     End-stage renal disease ([facility] )     Gastro-esophageal reflux disease without esophagitis     Hyperlipidemia     Hypertension     Long term current use of anticoagulant     Long term current use of insulin (HCC)     Muscle weakness     Nausea with vomiting     Other abnormalities of gait and mobility     Other specified abnormal findings of blood chemistry     Type 2 diabetes mellitus (HCC)     Ventral hernia without obstruction or gangrene     Vitamin D deficiency        Admitting Diagnosis: Aphasia [R47 01]  Mixed aphasia [R47 01]  Dysarthria [R47 1]  Acute encephalopathy [G93 40]  Stroke-like symptoms [R29 90]  [facility] Data of Health (NIH) Stroke Scale level of consciousness score 0, alert; keenly responsive [Z78 9]    Age/Sex: 81 y o  female    Assessment/Plan:   * Mixed aphasia   Assessment & Plan     · POA, slightly improving, but does have this in addition to lateralizing symptoms including left facial droop, left tongue deviation, and left arm weakness  As per daughter this happened earlier in the summer as well, but no work up was completed due to [facility] not having proper staffing  She had "snapped out of it" before  Neurology recommendations noted and appreciated  ?  Admit patient to med/surg under inpatient status under stroke pathway   § Consult Neuro   § Telemetry x 24 hours   § MRI   § Echo  § Neuro checks  § FLP/A1c  § ASA  § Lipitor  § PT/OT/Speech  § Serial NIH      Acute encephalopathy   Assessment & Plan     · POA, patient's mental status is improving, but is still slow to respond to questions  Daughter reports that this happened earlier this summer and she "snapped out of it"  ? Management as per primary   ? Monitor for infectious process   ? Encourage PO intake - she has passed dysphagia assessment      ESRD on hemodialysis [facility])   Assessment & Plan     · Gets HD on [facility]  She missed HD today   ? Consult nephrology for management       Essential hypertension   Assessment & Plan     · BP elevated on admission, but want permissive HTN as per primary problem   ?  Continue Norvasc, Labetalol with hold parameters          VTE Prophylaxis: Heparin  / sequential compression device      Anticipated Length of Stay: Briana Shelton will be admitted on an Inpatient basis with an anticipated length of stay of  Greater than 2 midnights    Justification for Hospital Stay: CVA symptoms     Admission Orders:  Scheduled Meds:   Current Facility-Administered Medications:  acetaminophen 650 mg Oral Q6H PRN Lucio Romero PA-C   albuterol 2 5 mg Nebulization Q6H PRN Lucio Velasquez PA-C   amLODIPine 5 mg Oral Daily Lucio Romero PA-C   aspirin 325 mg Oral Daily Lucio Romero PA-C   atorvastatin 40 mg Oral QPM Lucio Velasquez PA-C   busPIRone 5 mg Oral TID Lucio Romero PA-C   heparin (porcine) 5,000 Units Subcutaneous Q8H [facility] Lucio Velasquez PA-C   labetalol 100 mg Oral BID Lucio Velasquez PA-C   ondansetron 4 mg Intravenous Q6H PRN Lucio Romero PA-C   sertraline 50 mg Oral Daily Lucio Romero PA-C     Neuro checks q4h  Dysphagia >  Renal diet  Activity as tolerated  Hemodialysis adult  Consult PT/OT/speech  O2 @ 2L via NC

## 2019-01-07 NOTE — ASSESSMENT & PLAN NOTE
· Gets HD on [facility]   She missed HD today   · Discussed with nephrology   · Short session today; resume regular schedule tomorrow

## 2019-01-07 NOTE — PLAN OF CARE
Problem: SLP ADULT - SWALLOWING, IMPAIRED  Goal: Advance to least restrictive diet without signs or symptoms of aspiration for planned discharge setting  See evaluation for individualized goals        Outcome: Not Progressing

## 2019-01-07 NOTE — ASSESSMENT & PLAN NOTE
· POA, improved  MRI negative for CVA  ? Epileptic event  ?Dehydration given below dry weight  Neurology recommendations noted and appreciated   Daughter reports today that her affect is flat and she isn't as responsive as usual   · Discontinue stroke pathway   · Check EEG-- results pending  · Neuro checks   · Monitor for improvement of mental status with HD

## 2019-01-07 NOTE — ASSESSMENT & PLAN NOTE
· POA, patient's mental status is improving and appears to be at baseline  I believe that the patient's functional status is overall low  Her MRI was negative for acute infarction  Daughter reports that this happened earlier this summer and she "snapped out of it"   He daughter states that today her affect is flat and she's having new difficulty swallowing   · Management as per primary   · Encourage PO intake-- speech has downgraded diet and recommend VBS for further evaluation of new dysphagia   · Speech consult

## 2019-01-07 NOTE — SPEECH THERAPY NOTE
Speech Language/Pathology    Speech/Language Pathology Progress Note    Patient Name: Rafael Larson  [id number] Date: 1/7/2019       Subjective:  Per chart review, there were concerns re coughing with PO intake yesterday 1/6/19  Network SLP spoke with staff and rx'd NPO until seen by speech therapist today  Pt was not held NPO and received dinner (ate 75%) and breakfast this AM  Spoke with current nurse and pt did have coughing with scrambled eggs requiring the use of oral suctioning  Pt awake and alert when SLP entered room for diagnostic dysphagia tx; however, there was limited verbal output noted with hesitancy to participate  Daughter was bedside and reported concern with pt's current state as she was very conversant on admission/in ER with daughter  Objective:  Pt seen with trials of puree, soft solids, HTL via cup, NTL via cup and straw, and thin via cup  Pt presented with mildly decreased and prolonged mastication with soft solids with munch-like chew and evidence of lingual residue with need for NTL wash  Adequate oral control and transfer with timely swallow initiation with all consistencies  Pt did have facial grimace and coughing with ALL trials  Pt unable to explain reason for facial grimacing-when asked reports no pain or discomfort  Pt's coughing did result in use of oral suctioning- mucus was white/clear  Assessment:  Pt with cough at rest, but appeared to significantly increase with possible correlation to all PO intake  Pt appears at risk for aspiration  Recommend VBS with speech therapy to further assess swallow function and r/u aspiration       Plan/Recommendations:  -downgrade to dysphagia 2-mechanical with thin liquids  -meds crushed  -VBS with speech prior to determine least restrictive/safest diet

## 2019-01-07 NOTE — NURSING NOTE
Fentanyl patch from 1/2/19 found on pt 's R shoulder  Removed patch with Pushpa and witnessed flush down toilet

## 2019-01-08 ENCOUNTER — APPOINTMENT (INPATIENT)
Dept: DIALYSIS | Facility: HOSPITAL | Age: 84
DRG: 070 | End: 2019-01-08
Attending: INTERNAL MEDICINE
Payer: MEDICARE

## 2019-01-08 ENCOUNTER — APPOINTMENT (INPATIENT)
Dept: RADIOLOGY | Facility: HOSPITAL | Age: 84
DRG: 070 | End: 2019-01-08
Payer: MEDICARE

## 2019-01-08 VITALS
BODY MASS INDEX: 33.04 KG/M2 | TEMPERATURE: 98 F | RESPIRATION RATE: 16 BRPM | DIASTOLIC BLOOD PRESSURE: 57 MMHG | WEIGHT: 186.51 LBS | OXYGEN SATURATION: 98 % | SYSTOLIC BLOOD PRESSURE: 151 MMHG | HEART RATE: 61 BPM

## 2019-01-08 LAB
ANION GAP SERPL CALCULATED.3IONS-SCNC: 10 MMOL/L (ref 4–13)
BASOPHILS # BLD AUTO: 0.04 THOUSANDS/ΜL (ref 0–0.1)
BASOPHILS NFR BLD AUTO: 1 % (ref 0–1)
BUN SERPL-MCNC: 31 MG/DL (ref 5–25)
CALCIUM SERPL-MCNC: 8.9 MG/DL (ref 8.3–10.1)
CHLORIDE SERPL-SCNC: 102 MMOL/L (ref 100–108)
CO2 SERPL-SCNC: 26 MMOL/L (ref 21–32)
CREAT SERPL-MCNC: 4.96 MG/DL (ref 0.6–1.3)
EOSINOPHIL # BLD AUTO: 0.36 THOUSAND/ΜL (ref 0–0.61)
EOSINOPHIL NFR BLD AUTO: 7 % (ref 0–6)
ERYTHROCYTE [DISTWIDTH] IN BLOOD BY AUTOMATED COUNT: 15.9 % (ref 11.6–15.1)
GFR SERPL CREATININE-BSD FRML MDRD: 9 ML/MIN/1.73SQ M
GLUCOSE SERPL-MCNC: 84 MG/DL (ref 65–140)
HCT VFR BLD AUTO: 28.9 % (ref 34.8–46.1)
HGB BLD-MCNC: 9.1 G/DL (ref 11.5–15.4)
IMM GRANULOCYTES # BLD AUTO: 0.01 THOUSAND/UL (ref 0–0.2)
IMM GRANULOCYTES NFR BLD AUTO: 0 % (ref 0–2)
LYMPHOCYTES # BLD AUTO: 1.12 THOUSANDS/ΜL (ref 0.6–4.47)
LYMPHOCYTES NFR BLD AUTO: 21 % (ref 14–44)
MCH RBC QN AUTO: 27.3 PG (ref 26.8–34.3)
MCHC RBC AUTO-ENTMCNC: 31.5 G/DL (ref 31.4–37.4)
MCV RBC AUTO: 87 FL (ref 82–98)
MONOCYTES # BLD AUTO: 0.53 THOUSAND/ΜL (ref 0.17–1.22)
MONOCYTES NFR BLD AUTO: 10 % (ref 4–12)
NEUTROPHILS # BLD AUTO: 3.3 THOUSANDS/ΜL (ref 1.85–7.62)
NEUTS SEG NFR BLD AUTO: 61 % (ref 43–75)
NRBC BLD AUTO-RTO: 0 /100 WBCS
PHOSPHATE SERPL-MCNC: 2.8 MG/DL (ref 2.3–4.1)
PLATELET # BLD AUTO: 157 THOUSANDS/UL (ref 149–390)
PMV BLD AUTO: 11.4 FL (ref 8.9–12.7)
POTASSIUM SERPL-SCNC: 4.5 MMOL/L (ref 3.5–5.3)
RBC # BLD AUTO: 3.33 MILLION/UL (ref 3.81–5.12)
SODIUM SERPL-SCNC: 138 MMOL/L (ref 136–145)
WBC # BLD AUTO: 5.36 THOUSAND/UL (ref 4.31–10.16)

## 2019-01-08 PROCEDURE — 74230 X-RAY XM SWLNG FUNCJ C+: CPT

## 2019-01-08 PROCEDURE — 5A1D70Z PERFORMANCE OF URINARY FILTRATION, INTERMITTENT, LESS THAN 6 HOURS PER DAY: ICD-10-PCS | Performed by: INTERNAL MEDICINE

## 2019-01-08 PROCEDURE — 99239 HOSP IP/OBS DSCHRG MGMT >30: CPT | Performed by: PHYSICIAN ASSISTANT

## 2019-01-08 PROCEDURE — 92611 MOTION FLUOROSCOPY/SWALLOW: CPT

## 2019-01-08 PROCEDURE — 80048 BASIC METABOLIC PNL TOTAL CA: CPT | Performed by: INTERNAL MEDICINE

## 2019-01-08 PROCEDURE — 85025 COMPLETE CBC W/AUTO DIFF WBC: CPT | Performed by: INTERNAL MEDICINE

## 2019-01-08 PROCEDURE — 99232 SBSQ HOSP IP/OBS MODERATE 35: CPT | Performed by: INTERNAL MEDICINE

## 2019-01-08 PROCEDURE — 84100 ASSAY OF PHOSPHORUS: CPT | Performed by: INTERNAL MEDICINE

## 2019-01-08 RX ORDER — ASPIRIN 81 MG/1
81 TABLET ORAL DAILY
Qty: 30 TABLET | Refills: 0
Start: 2019-01-08 | End: 2019-01-08 | Stop reason: HOSPADM

## 2019-01-08 RX ORDER — ATORVASTATIN CALCIUM 40 MG/1
40 TABLET, FILM COATED ORAL EVERY EVENING
Qty: 30 TABLET | Refills: 0
Start: 2019-01-08

## 2019-01-08 RX ORDER — GUAIFENESIN 600 MG
600 TABLET, EXTENDED RELEASE 12 HR ORAL EVERY 12 HOURS SCHEDULED
Qty: 30 TABLET | Refills: 0 | Status: SHIPPED | OUTPATIENT
Start: 2019-01-08

## 2019-01-08 RX ORDER — ASPIRIN 81 MG/1
162 TABLET, CHEWABLE ORAL DAILY
Status: DISCONTINUED | OUTPATIENT
Start: 2019-01-09 | End: 2019-01-08 | Stop reason: HOSPADM

## 2019-01-08 RX ORDER — ASPIRIN 81 MG/1
162 TABLET, CHEWABLE ORAL DAILY
Qty: 30 TABLET | Refills: 0 | Status: ON HOLD
Start: 2019-01-09 | End: 2019-12-27 | Stop reason: SDUPTHER

## 2019-01-08 RX ADMIN — BUSPIRONE HYDROCHLORIDE 5 MG: 5 TABLET ORAL at 17:12

## 2019-01-08 RX ADMIN — HEPARIN SODIUM 5000 UNITS: 5000 INJECTION, SOLUTION INTRAVENOUS; SUBCUTANEOUS at 05:24

## 2019-01-08 RX ADMIN — SERTRALINE HYDROCHLORIDE 50 MG: 50 TABLET ORAL at 08:23

## 2019-01-08 RX ADMIN — GUAIFENESIN 600 MG: 600 TABLET, EXTENDED RELEASE ORAL at 08:23

## 2019-01-08 RX ADMIN — LABETALOL HYDROCHLORIDE 100 MG: 100 TABLET, FILM COATED ORAL at 17:12

## 2019-01-08 RX ADMIN — ASPIRIN 325 MG: 325 TABLET ORAL at 08:23

## 2019-01-08 RX ADMIN — HEPARIN SODIUM 5000 UNITS: 5000 INJECTION, SOLUTION INTRAVENOUS; SUBCUTANEOUS at 13:12

## 2019-01-08 RX ADMIN — DESMOPRESSIN ACETATE 40 MG: 0.2 TABLET ORAL at 17:12

## 2019-01-08 NOTE — PROGRESS NOTES
Attempted to call report to [email or web address] and no one answered after 5 minutes  Faxed paperwork to [facility]

## 2019-01-08 NOTE — ASSESSMENT & PLAN NOTE
· POA, improved  MRI negative for CVA  No epileptic event on EEG  · EEG-- Abnormalities are consistent with moderate diffuse cerebral dysfunction with superimposed focal dysfunction in the left temporal lobe  No interictal epileptiform discharges were noted  No electrographic seizures occurred during the recording     · Discussed with neuro-- recommend aspirin/statin for secondary prevention

## 2019-01-08 NOTE — ASSESSMENT & PLAN NOTE
· POA, patient's mental status is improving and appears to be at baseline  I believe that the patient's functional status is overall low  Her MRI was negative for acute infarction  Daughter reports that this happened earlier this summer and she "snapped out of it"   He daughter states that today her affect is flat and she's having new difficulty swallowing   · Management as per primary   · Encourage PO intake-- VBS indicated penetration, discharge diet will be dysphagia level 2 with thin liquids

## 2019-01-08 NOTE — PROCEDURES
Video Swallow Study      Patient Name: Leola SANTANA Date: 1/8/2019        Past Medical History  Past Medical History:   Diagnosis Date    Altered mental status, unspecified     Anemia     Atherosclerotic heart disease of native coronary artery without angina pectoris     Cancer (HCC)     Chest pain     Chronic diastolic (congestive) heart failure (HCC)     [facility]     Diabetes mellitus [facility]     End-stage renal disease ([facility] )     Gastro-esophageal reflux disease without esophagitis     Hyperlipidemia     Hypertension     Long term current use of anticoagulant     Long term current use of insulin (HCC)     Muscle weakness     Nausea with vomiting     Other abnormalities of gait and mobility     Other specified abnormal findings of blood chemistry     Type 2 diabetes mellitus (HCC)     Ventral hernia without obstruction or gangrene     Vitamin D deficiency         Past Surgical History  Past Surgical History:   Procedure Laterality Date    AV FISTULA PLACEMENT      MASTECTOMY      R arm         General Information:    82 yo Female referred for a VBS by [facility] NALINI for further evaluation of pt's swallow function  Pt was seen bedside for a dysphagia re-evaluation yesterday 1/7/19 and was found to be coughing with all consistencies presented  Nsg reported coughing/concerns with multiple PO meals resulting in the need for oral suctioning  Pt's daughter also reports concerns regarding pt's current mental status and communication abilities as pt is oriented and conversive at baseline and has experienced a decrease in communication abilities/desire  Cognition:  Decreased- able to follow simple commands, limited verbal output noted     Speech/Swallow Mech: Oral motor movements appeared overall decreased; Dentition was adequate;  Respiratory Status: RA; Current diet: dysphagia 2-mechanical with thin liquids Prior VBS N/A    Pt was seen in radiology for a Video Barium Swallow Study, seated in the upright position and viewed laterally with the following consistencies: puree, soft solids, HTL, NTL, thin, and barium tablet whole with puree      Results are as follows:     **Images are available for review on PACS          Oral Stage:   Mildly decreased and prolonged mastication with decreased bolus formation was seen with soft solids  Mildly decreased oral control and posterior transfer seen with all consistencies  Premature spillage before the swallow with liquids>solids  Pharyngeal Stage:   Mildly delayed swallow initiation with both solids and liquids with bolus stasis in vallecula prior to initiation  Epiglottic inversion and hyolaryngeal excursion were complete  Transient penetration before the swallow inconsistently seen with thin liquids  No other penetration or aspiration seen  No pharyngeal residue  Esophageal Stage:   Briefly assessed  Barium tablet whole in puree passed w/o difficulty  Assessment Summary:   Pt presents with mild oropharyngeal dysphagia  Transient penetration was seen inconsistently with thin liquids  No aspiration was seen during the study       Recommendations:  -Dysphagia 2-mechanical/finely chopped solids with thin liquids via single sips  -Pills whole or crushed in puree

## 2019-01-08 NOTE — QUICK NOTE
Chitra Arechiga is a 81 y o  female with a PMH diabetes, hypertension, hyperlipidemia, end-stage renal disease, CHF, and prior strokes who presented to the hospital with symptoms of mixed aphasia and intermittent dysarthria  NIHSS 6  No tPA as symptoms resolved  Symptoms may be related to metabolic dysfunction  Cannot completely exclude seizure, given risk factor of old strokes (No prior history of seizure)  Routine EEG findings likely related to presence of old strokes as well as metabolic encephalopathy  No epileptic events noted  Can consider hypertensive encephalopathy as patient was hypertensive in the 170/180s on arrival vs TIA as well  · MRI revealed no acute infarct, hemorrhage, or mass  · Continue aspirin and statin  · EEG revealed diffuse cerebral dysfunction with superimposed focal dysfunction in the left temporal lobe  Likely related to presence of old strokes, as well as metabolic encephalopathy     · Continue regular dialysis   · Stat CT Head with any acute neurologic changes

## 2019-01-08 NOTE — ASSESSMENT & PLAN NOTE
· Gets HD on [facility]  She missed HD on day of admission; attempted to have shorted session on Saturday, but was unable to tolerated 2/2 low blood pressures   HD stopped, short session completed 1/7/19-- regular session 1/8/19   · Discussed with nephrology

## 2019-01-08 NOTE — DISCHARGE SUMMARY
Discharge- Chitra Arechiga 1935, 81 y o  female MRN: [id number]    Unit/Bed#: -01 Encounter: [id number]    Primary Care Provider: Bubba Ly MD   Date and time admitted to hospital: 1/5/2019  8:35 AM    Acute encephalopathy   Assessment & Plan    · POA, patient's mental status is improving and appears to be at baseline  I believe that the patient's functional status is overall low  Her MRI was negative for acute infarction  Daughter reports that this happened earlier this summer and she "snapped out of it"  He daughter states that today her affect is flat and she's having new difficulty swallowing   · Management as per primary   · Encourage PO intake-- VBS indicated penetration, discharge diet will be dysphagia level 2 with thin liquids     Essential hypertension   Assessment & Plan    · BP elevated on admission, but has improved  · Continue Norvasc, Labetalol       ESRD on hemodialysis [facility])   Assessment & Plan    · Gets HD on TThS  She missed HD on day of admission; attempted to have shorted session on Saturday, but was unable to tolerated 2/2 low blood pressures  HD stopped, short session completed 1/7/19-- regular session 1/8/19   · Discussed with nephrology       * Mixed aphasia   Assessment & Plan    · POA, improved  MRI negative for CVA  No epileptic event on EEG  · EEG-- Abnormalities are consistent with moderate diffuse cerebral dysfunction with superimposed focal dysfunction in the left temporal lobe  No interictal epileptiform discharges were noted  No electrographic seizures occurred during the recording     · Discussed with neuro-- recommend aspirin/statin for secondary prevention          Discharging Physician / Practitioner: Tyler Honeycutt PA-C  PCP: Bubba Ly MD  Admission Date:   Admission Orders     Ordered        01/05/19 1106  Inpatient Admission (expected length of stay for this patient is greater than two midnights)  Once             Discharge Date: 01/08/19    Resolved Problems  Date Reviewed: 1/8/2019    None          Consultations During Hospital Stay:  · Neurology-- Dr Tod Gowers  · Nephrology-- Dr David Watson    Procedures Performed:   · CXR portable  · CT stoke alert brain  · MRI brain wo contrast  · EEG   · FL barium swallow video    Significant Findings / Test Results:   · CXR portable  · No acute cardiopulmonary disease  · CT stoke alert brain  · Cerebral atrophy with chronic small vessel ischemic change  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  · MRI brain wo contrast  · 1  No evidence of acute infarct, hemorrhage or mass effect  2   Small, chronic left MCA territory infarct and chronic infarcts in the right cerebellar hemisphere  Advanced microangiopathic disease  · EEG   · This 31 minute Routine EEG recorded during wakefulness, drowsiness, and sleep is abnormal, due to background irregularity and intermixed theta and delta slowing, and intermittent left temporal delta slowing  Abnormalities are consistent with moderate diffuse cerebral dysfunction with superimposed focal dysfunction in the left temporal lobe  No interictal epileptiform discharges were noted  No electrographic seizures occurred during the recording  · FL barium swallow video  · Pt presents with mild oropharyngeal dysphagia  Transient penetration was seen inconsistently with thin liquids  No aspiration was seen during the study  Incidental Findings:   · OP dysphagia    Test Results Pending at Discharge (will require follow up):   · none     Outpatient Tests Requested:  · none    Complications:  none    Reason for Admission: dysarthria     Hospital Course:     Alba Estrada is a 81 y o  female patient who originally presented to the hospital on 1/5/2019 due to dysarthria  Patient arrived at hemodialysis with difficulty speaking  Symptoms began approximately at 8:00 a m  And patient arrived in the ED at 8:30 a m     By around 9:00 a m  Symptoms were improved  Neurology was consulted for stroke alert  Initial CT scan did not show acute infarct, though did show chronic vessel disease and old strokes in the left frontal region and right cerebellar region  Recommended placing patient on CVA pathway and obtaining MRI  S patient missed her hemodialysis session secondary to this acute event, patient was seen by Nephrology and attempted to have a short session of dialysis upon arrival   However, patient's blood pressures did not tolerate dialysis and session was canceled  Patient had negative MRI stroke pathway was discontinued  Patient had a short session of dialysis on Monday which she tolerated well  Patient had EGD on Monday which showed diffuse cerebral dysfunction with superimposed focal dysfunction left temporal lobe  Overall, patient's mental status was stable throughout the course of her admission  EEG findings were discussed with Neurology who felt these related to her old strokes, and recommended secondary prevention with aspirin/statin upon discharge  On 1/7/2019, patient had significant coughing with eating  Speech evaluated patient and downgraded patient's diet to mechanical soft with thin liquids and recommended a video barium swallow  Patient very swallow did show penetration they recommend discharging with dysphagia level 2 with thin liquids  Please see above list of diagnoses and related plan for additional information  Condition at Discharge: fair     Discharge Day Visit / Exam:     Subjective:  Patient feels well today  She knows in the hospital   Offers no complaints  Vitals: Blood Pressure: 138/51 (01/08/19 1500)  Pulse: 59 (01/08/19 1500)  Temperature: 98 3 °F (36 8 °C) (01/08/19 0717)  Temp Source: Oral (01/08/19 0717)  Respirations: 16 (01/08/19 0717)  Weight - Scale: 84 6 kg (186 lb 8 2 oz) (01/05/19 0903)  SpO2: 95 % (01/08/19 0717)  Exam:   Physical Exam   Constitutional: No distress  HENT:   Head: Normocephalic and atraumatic  Mouth/Throat: No oropharyngeal exudate  Eyes: Pupils are equal, round, and reactive to light  Conjunctivae and EOM are normal  No scleral icterus  Neck: No JVD present  Cardiovascular: Normal rate and regular rhythm  Exam reveals no gallop and no friction rub  No murmur heard  Pulmonary/Chest: Effort normal and breath sounds normal  No respiratory distress  She has no wheezes  She has no rales  Abdominal: Soft  Bowel sounds are normal  She exhibits no distension  There is no tenderness  There is no rebound  Musculoskeletal: She exhibits no edema or tenderness  Neurological: She is alert  She displays normal reflexes  No cranial nerve deficit  She exhibits normal muscle tone  Skin: Skin is warm and dry  No rash noted  She is not diaphoretic  No erythema  Psychiatric: She has a normal mood and affect  Her behavior is normal        Discussion with Family: discussed with daughter via phone     Discharge instructions/Information to patient and family:   See after visit summary for information provided to patient and family  Provisions for Follow-Up Care:  See after visit summary for information related to follow-up care and any pertinent home health orders  Disposition:     [facility] (see below)    For Discharges to [facility]:   · [facility] / Todd Sam at [address] Texted SNF Physician    Planned Readmission: none     Discharge Statement:  I spent 45 minutes discharging the patient  This time was spent on the day of discharge  I had direct contact with the patient on the day of discharge  Greater than 50% of the total time was spent examining patient, answering all patient questions, arranging and discussing plan of care with patient as well as directly providing post-discharge instructions  Additional time then spent on discharge activities      Discharge Medications:  See after visit summary for reconciled discharge medications provided to patient and family        ** Please Note: This note has been constructed using a voice recognition system **

## 2019-01-08 NOTE — PROGRESS NOTES
NEPHROLOGY PROGRESS NOTE   Kirt Singer 81 y o  female MRN: [id number]  Unit/Bed#: -01 Encounter: [id number]    ASSESSMENT & PLAN:  42-year-old female End-stage renal disease on hemodialysis presented with aphasia  MRI was negative for CVA  Patient was seen by Neurology  Aphasia improved    ESRD on hemodialysis  -gets hemodialysis TTS at [facility]   Dry weight 82 5 kg  -patient had hemodialysis yesterday with ultrafiltration 1000 mL  Patient's post dialysis weight yesterday was 83 7  Will repeat hemodialysis again today to place her back on schedule TTS  Plan for ultrafiltration to her weight slightly below dry weight  Will target for weight of 82 0 kg  Dialysis orders have been placed  Plan for 4 hours of hemodialysis treatment   -access-left IJ PermCath  Anemia due to chronic kidney disease on dialysis  -hemoglobin 9 6  Hemoglobin is below goal but overall stable  -reviewed outpatient record  Patient receives Gooding as outpatient - last dose on 12/31 50 mcg  No need for Epogen as inpatient at this time  Hypertension and chronic kidney disease  -blood pressure acceptable  Continue home antihypertensive medication  Patient is on amlodipine and labetalol  Blood pressure should improve further with hemodialysis treatment today  CKD/MBD  -phosphorus level at goal   Continue renal diet  Patient is not on any binders as per home medication list     Aphasia/Encephalopathy: Resolving  As per primary team's note, mental status is at baseline  MRI was negative for acute infarct  Noted plan for evaluation of dysphagia  Will discuss with primary team   Disposition per primary team     SUBJECTIVE:  No shortness of breath, no aphasia  Appears awake and alert      OBJECTIVE:  Current Weight: Weight - Scale: 84 6 kg (186 lb 8 2 oz)  Vitals:    01/08/19 0717   BP: 151/63   Pulse: 71   Resp: 16   Temp: 98 3 °F (36 8 °C)   SpO2: 95%       Intake/Output Summary (Last 24 hours) at 01/08/19 [facility] filed at 01/07/19 2050   Gross per 24 hour   Intake              680 ml   Output             1000 ml   Net             -320 ml       Physical Exam   General:  Ill looking, awake  Alert and follows commands  Eyes: Conjunctivae pink,  Sclera anicteric  ENT: lips and mucous membranes moist  Neck: supple   Chest: Clear to Auscultation both lungs,  no crackles, ronchus or wheezing  Left IJ PermCath  CVS: S1 & S2 present, normal rate, regular rhythm, no murmur  Abdomen: soft, non-tender, non-distended, Bowel sounds normoactive  Extremities: no edema of  legs  Skin: no rash  Neuro: awake, alert, oriented x2  Not oriented to year          Medications:    Current Facility-Administered Medications:     acetaminophen (TYLENOL) tablet 650 mg, 650 mg, Oral, Q6H PRN, Dallin Bunting, PA-C    albuterol inhalation solution 2 5 mg, 2 5 mg, Nebulization, Q6H PRN, Lucio Velasquez, PA-C    amLODIPine (NORVASC) tablet 5 mg, 5 mg, Oral, Daily, Lucio Velasquez, PA-C, 5 mg at 01/07/19 3393    aspirin tablet 325 mg, 325 mg, Oral, Daily, Lucio Velasquez, PA-C, 325 mg at 01/08/19 6476    atorvastatin (LIPITOR) tablet 40 mg, 40 mg, Oral, QPM, Lucio Velasquez, PA-C, 40 mg at 01/07/19 1740    busPIRone (BUSPAR) tablet 5 mg, 5 mg, Oral, TID, Lucio Velasquez, PA-C, 5 mg at 01/07/19 2212    guaiFENesin (MUCINEX) 12 hr tablet 600 mg, 600 mg, Oral, Q12H [address], Kiara A Brodyegass, PA-C, 600 mg at 01/08/19 0823    heparin (porcine) subcutaneous injection 5,000 Units, 5,000 Units, Subcutaneous, Q8H [address], 5,000 Units at 01/08/19 0524 **AND** [CANCELED] Platelet count, , , Once, Lucio Velasquez PA-C    labetalol (NORMODYNE) tablet 100 mg, 100 mg, Oral, BID, Lucio Velasquez PA-C, 100 mg at 01/07/19 0943    ondansetron (ZOFRAN) injection 4 mg, 4 mg, Intravenous, Q6H PRN, Dallin Carrion PA-C    sertraline (ZOLOFT) tablet 50 mg, 50 mg, Oral, Daily, Lucio Velasquez PA-C, 50 mg at 01/08/19 9271    Invasive Devices:        Lab Results:     Results from last 7 days  Lab Units 01/08/19  0513 01/07/19  0619 01/06/19  0449 01/05/19  0855 01/05/19  0854  01/05/19  0850   WBC Thousand/uL 5 36 5 69 5 57  --   --   --  7 49   HEMOGLOBIN g/dL 9 1* 9 6* 8 9*  --   --   --  10 1*   I STAT HEMOGLOBIN g/dl  --   --   --  11 2* 10 5*  < >  --    HEMATOCRIT % 28 9* 31 2* 28 9*  --   --   --  32 9*   HEMATOCRIT, ISTAT %  --   --   --  33* 31*  < >  --    PLATELETS Thousands/uL 157 171 145*  --   --   --  182   POTASSIUM mmol/L 4 5  --  4 5  --   --   --  5 0   CHLORIDE mmol/L 102  --  107  --   --   --  107   CO2 mmol/L 26  --  22  --   --   --  22   CO2, I-STAT mmol/L  --   --   --  23 22  < >  --    BUN mg/dL 31*  --  44*  --   --   --  51*   CREATININE mg/dL 4 96*  --  6 04*  --   --   --  6 59*   CALCIUM mg/dL 8 9  --  9 5  --   --   --  9 5   PHOSPHORUS mg/dL 2 8  --   --   --   --   --   --    GLUCOSE, ISTAT mg/dl  --   --   --  111 114  --   --    < > = values in this interval not displayed  Previous work up:     IMPRESSION:   MRI brain     1  No evidence of acute infarct, hemorrhage or mass effect  2   Small, 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

## 2019-01-09 ENCOUNTER — TRANSITIONAL CARE MANAGEMENT (OUTPATIENT)
Dept: INTERNAL MEDICINE CLINIC | Facility: CLINIC | Age: 84
End: 2019-01-09

## 2019-01-10 ENCOUNTER — TELEPHONE (OUTPATIENT)
Dept: INTERNAL MEDICINE CLINIC | Facility: CLINIC | Age: 84
End: 2019-01-10

## 2019-01-10 NOTE — TELEPHONE ENCOUNTER
Radha Patel was admitted to [facility] on 1/5/19  Wenceslao Ma She was discharged back to the facility on 1/8/19  She has been receiving long term care at [address] [place] [address] for over a year  I called and confirmed this information  [employer] updated  She is receiving her PCP care from the facility Dr Jennifer Waddell

## 2019-01-15 LAB
EST. AVERAGE GLUCOSE BLD GHB EST-MCNC: 88 MG/DL
HBA1C MFR BLD: 4.7 % (ref 4.2–6.3)

## 2019-04-25 ENCOUNTER — APPOINTMENT (EMERGENCY)
Dept: RADIOLOGY | Facility: HOSPITAL | Age: 84
End: 2019-04-25
Payer: MEDICARE

## 2019-04-25 ENCOUNTER — HOSPITAL ENCOUNTER (EMERGENCY)
Facility: HOSPITAL | Age: 84
Discharge: NURSING, REHAB OR LONG-TERM CARE | End: 2019-04-25
Attending: EMERGENCY MEDICINE | Admitting: EMERGENCY MEDICINE
Payer: MEDICARE

## 2019-04-25 VITALS
HEART RATE: 66 BPM | TEMPERATURE: 97.7 F | SYSTOLIC BLOOD PRESSURE: 197 MMHG | OXYGEN SATURATION: 100 % | BODY MASS INDEX: 32.8 KG/M2 | WEIGHT: 185.19 LBS | DIASTOLIC BLOOD PRESSURE: 89 MMHG | RESPIRATION RATE: 20 BRPM

## 2019-04-25 DIAGNOSIS — R07.9 CHEST PAIN, UNSPECIFIED TYPE: Primary | ICD-10-CM

## 2019-04-25 PROCEDURE — 99285 EMERGENCY DEPT VISIT HI MDM: CPT

## 2019-04-25 PROCEDURE — 71045 X-RAY EXAM CHEST 1 VIEW: CPT

## 2019-04-25 PROCEDURE — 93005 ELECTROCARDIOGRAM TRACING: CPT

## 2019-04-25 PROCEDURE — 99283 EMERGENCY DEPT VISIT LOW MDM: CPT | Performed by: EMERGENCY MEDICINE

## 2019-04-26 LAB
ATRIAL RATE: 64 BPM
P AXIS: 59 DEGREES
PR INTERVAL: 156 MS
QRS AXIS: -40 DEGREES
QRSD INTERVAL: 88 MS
QT INTERVAL: 430 MS
QTC INTERVAL: 437 MS
T WAVE AXIS: 76 DEGREES
VENTRICULAR RATE: 62 BPM

## 2019-04-26 PROCEDURE — 93010 ELECTROCARDIOGRAM REPORT: CPT | Performed by: INTERNAL MEDICINE

## 2019-07-16 ENCOUNTER — TELEPHONE (OUTPATIENT)
Dept: OTHER | Facility: OTHER | Age: 84
End: 2019-07-16

## 2019-07-16 NOTE — TELEPHONE ENCOUNTER
Leonela Arriaga from [address] [place] [address] needs to speak with the on call for this patient due to an elevate BP  209/99  She needs orders  A conference connect was made with Dr Elizabeth Chadwick

## 2019-09-22 ENCOUNTER — NURSING HOME VISIT (OUTPATIENT)
Dept: GERIATRICS | Facility: OTHER | Age: 84
End: 2019-09-22
Payer: MEDICARE

## 2019-09-22 DIAGNOSIS — Z99.2 ESRD ON HEMODIALYSIS (HCC): Chronic | ICD-10-CM

## 2019-09-22 DIAGNOSIS — R26.2 AMBULATORY DYSFUNCTION: Primary | ICD-10-CM

## 2019-09-22 DIAGNOSIS — G89.29 OTHER CHRONIC PAIN: ICD-10-CM

## 2019-09-22 DIAGNOSIS — R41.89 COGNITIVE IMPAIRMENT: ICD-10-CM

## 2019-09-22 DIAGNOSIS — N18.6 ESRD ON HEMODIALYSIS (HCC): Chronic | ICD-10-CM

## 2019-09-22 DIAGNOSIS — I10 ESSENTIAL HYPERTENSION: Chronic | ICD-10-CM

## 2019-09-22 PROCEDURE — 99309 SBSQ NF CARE MODERATE MDM 30: CPT | Performed by: FAMILY MEDICINE

## 2019-09-22 NOTE — PROGRESS NOTES
[facility]  [address]  [phone]  [facility] SNF List: [facility]      NAME: Dewayne Machuca  AGE: 81 y o  SEX: female [id number]    DATE OF ENCOUNTER: 9/22/2019    Assessment and Plan   1  Amb dysfunction: wheelchair mobility, fall precautions  2  ESRD on HD: (T-T-S)  tolerating well  3  HTN: cont Labetolol, Amlodipine, ASA, statin  4  Chronic pain: cont Fentanyl patch  5  Cognitive impairment with depression: cont Buspirone    - Counseling Documentation: patient was counseled regarding: importance of compliance with treatment, and prognosis    Chief Complaint     Resident seen for routine follow up visit  History of Present Illness     Resident seen and examined at bedside, sitting in her wheelchair, watching TV  She has no c/o at this time, tolerating HD (Tue, Thurs, Sat) well  She needs max assistance with ADLs  No recent falls, had one hospital admission with chest pain in 4/2019  No c/o from staff at this time  The following portions of the patient's history were reviewed and updated as appropriate: allergies, current medications, past family history, past medical history, past social history, past surgical history and problem list     Review of Systems     Review of Systems   Unable to perform ROS: Dementia   Constitutional: Positive for fatigue  Musculoskeletal: Positive for gait problem  Neurological: Positive for weakness  Active Problem List     Patient Active Problem List   Diagnosis    Mixed aphasia    ESRD on hemodialysis ([address] )    Essential hypertension    Acute encephalopathy    Aphasia    Other chronic pain    Ambulatory dysfunction    Cognitive impairment       Objective     Vitals: T: 97 8, P: 74, R: 18, BP: 150/78, 98% on RA, Wt: 186 3 lbs  Physical Exam   Constitutional: She appears well-developed and well-nourished  No distress  HENT:   Head: Normocephalic     Eyes: Conjunctivae and EOM are normal  Right eye exhibits no discharge  No scleral icterus  Neck: Normal range of motion  Cardiovascular: Normal rate, regular rhythm and normal heart sounds  No murmur heard  HD catheter in left chest   Pulmonary/Chest: Effort normal and breath sounds normal  No respiratory distress  She has no wheezes  Abdominal: Soft  Bowel sounds are normal  She exhibits no distension  There is no tenderness  Musculoskeletal: Normal range of motion  She exhibits edema  She exhibits no tenderness or deformity  Trace edema to b/l LE   Neurological: She is alert  No cranial nerve deficit  Skin: Skin is warm and dry  She is not diaphoretic  Psychiatric: She has a normal mood and affect  Her behavior is normal    Nursing note and vitals reviewed  Pertinent Laboratory/Diagnostic Studies:  Refer to labs from facility chart, labs 8/22/19    Current Medications   Medications reviewed and updated in facility chart  DOS: 9/22/2019  Facility: [facility] SNF List: [place]  BILLING CODE: [facility] Service: nursing home place of service: POS 32 Unskilled- No Part A Coverage  Diagnoses:   Diagnosis ICD-10-CM Associated Orders   1  Ambulatory dysfunction R26 2    2  ESRD on hemodialysis (HCC) N18 6     Z99 2    3  Essential hypertension I10    4  Other chronic pain G89 29    5   Cognitive impairment R41 89

## 2019-10-25 ENCOUNTER — NURSING HOME VISIT (OUTPATIENT)
Dept: GERIATRICS | Facility: OTHER | Age: 84
End: 2019-10-25
Payer: MEDICARE

## 2019-10-25 DIAGNOSIS — Z99.2 ESRD ON HEMODIALYSIS (HCC): Chronic | ICD-10-CM

## 2019-10-25 DIAGNOSIS — I10 ESSENTIAL HYPERTENSION: Primary | Chronic | ICD-10-CM

## 2019-10-25 DIAGNOSIS — K59.03 CONSTIPATION DUE TO OPIOID THERAPY: ICD-10-CM

## 2019-10-25 DIAGNOSIS — G89.29 OTHER CHRONIC PAIN: ICD-10-CM

## 2019-10-25 DIAGNOSIS — R26.2 AMBULATORY DYSFUNCTION: ICD-10-CM

## 2019-10-25 DIAGNOSIS — N18.6 ESRD ON HEMODIALYSIS (HCC): Chronic | ICD-10-CM

## 2019-10-25 DIAGNOSIS — T40.2X5A CONSTIPATION DUE TO OPIOID THERAPY: ICD-10-CM

## 2019-10-25 PROCEDURE — 99309 SBSQ NF CARE MODERATE MDM 30: CPT | Performed by: NURSE PRACTITIONER

## 2019-10-25 NOTE — ASSESSMENT & PLAN NOTE
There have been no episodes of hypotension  Systolic blood pressures have been running high  Increase amlodipine to 10 mg by mouth daily  Continue aspirin, statin, labetalol

## 2019-10-25 NOTE — ASSESSMENT & PLAN NOTE
Patient is tolerating hemodialysis 3 times a week on Tuesdays, Thursday, Saturday  Continue Renvela

## 2019-10-25 NOTE — PROGRESS NOTES
[facility]  [address]  [phone]  [place]   POS: 31: SNF/Short Term Rehab        NAME: Apple Harris  AGE: 81 y o  SEX: female    DATE OF ENCOUNTER: 10/25/2019    Assessment and Plan     Essential hypertension  There have been no episodes of hypotension  Systolic blood pressures have been running high  Increase amlodipine to 10 mg by mouth daily  Continue aspirin, statin, labetalol  Other chronic pain  No signs and symptoms of uncontrolled pain noted  Continue fentanyl patch, Tylenol  Monitor pain levels  ESRD on hemodialysis [facility])  Patient is tolerating hemodialysis 3 times a week on Tuesdays, Thursday, Saturday  Continue Renvela  Ambulatory dysfunction  Fall precautions  Wheelchair for mobility  Constipation due to opioid therapy  Continue Colace, MiraLax  Cognitive impairment with depression  Continue supportive care, reorientation, redirection, assist with ADLs, BuSpar  Chief Complaint     Progress Note    History of Present Illness     Patient seen and examined at bedside  She appears to be in stable condition  Staff denies any new complaints  Patient denies chest pain, shortness of breath, abdominal pain, nausea, vomiting, diarrhea, constipation, headache, dizziness, pain  She receives hemodialysis Tuesday, Thursday, Saturday and is tolerating well  She is wheelchair bound  There have been no recent reported falls  Oral intake has been poor to good  She receives nephro as a nutritional supplement  She requires max assistance for ADLs  Hypertension-there have been no episodes of hypotension  Her systolic blood pressures have been running high  Labs and medications have been reviewed  Review of Systems     ROS as per noted in HPI      Objective     Vitals:  Blood pressure 163/68, pulse 63, respirations 18, temperature 97 4°  Pulse ox 100% on room air     Physical Exam   Constitutional: She appears well-nourished  HENT:   Head: Normocephalic  Eyes: Pupils are equal, round, and reactive to light  Conjunctivae and EOM are normal    Neck: Neck supple  Cardiovascular: Normal rate and normal heart sounds  Exam reveals no gallop and no friction rub  No murmur heard  HD catheter left upper chest   Dressing dry and intact  Pulmonary/Chest: Effort normal and breath sounds normal  She has no wheezes  She has no rales  Abdominal: Soft  Bowel sounds are normal  She exhibits no distension  There is no tenderness  Musculoskeletal: Normal range of motion  She exhibits no edema  Neurological: She is alert  Skin: Skin is warm and dry  Nursing note and vitals reviewed  Current Medications   Medications were reviewed and updated in facility chart       TIANA López  10/25/2019 11:18 AM

## 2019-10-25 NOTE — ASSESSMENT & PLAN NOTE
No signs and symptoms of uncontrolled pain noted  Continue fentanyl patch, Tylenol  Monitor pain levels

## 2019-12-26 ENCOUNTER — HOSPITAL ENCOUNTER (OUTPATIENT)
Facility: HOSPITAL | Age: 84
Setting detail: OBSERVATION
Discharge: NURSING, REHAB OR LONG-TERM CARE | DRG: 193 | End: 2019-12-27
Attending: EMERGENCY MEDICINE | Admitting: INTERNAL MEDICINE
Payer: MEDICARE

## 2019-12-26 ENCOUNTER — APPOINTMENT (OUTPATIENT)
Dept: RADIOLOGY | Facility: HOSPITAL | Age: 84
DRG: 193 | End: 2019-12-26
Payer: MEDICARE

## 2019-12-26 DIAGNOSIS — R47.1 DYSARTHRIA: ICD-10-CM

## 2019-12-26 DIAGNOSIS — N18.6 ESRD ON HEMODIALYSIS (HCC): Chronic | ICD-10-CM

## 2019-12-26 DIAGNOSIS — I10 ESSENTIAL HYPERTENSION: Chronic | ICD-10-CM

## 2019-12-26 DIAGNOSIS — Z99.2 ESRD ON HEMODIALYSIS (HCC): Chronic | ICD-10-CM

## 2019-12-26 DIAGNOSIS — T82.9XXA COMPLICATION OF VASCULAR DIALYSIS CATHETER, UNSPECIFIED COMPLICATION, INITIAL ENCOUNTER: Primary | ICD-10-CM

## 2019-12-26 PROBLEM — F11.20 CONTINUOUS OPIOID DEPENDENCE (HCC): Status: ACTIVE | Noted: 2019-12-26

## 2019-12-26 PROBLEM — Z78.9 PROBLEM WITH VASCULAR ACCESS: Status: ACTIVE | Noted: 2019-12-26

## 2019-12-26 LAB
ANION GAP SERPL CALCULATED.3IONS-SCNC: 12 MMOL/L (ref 4–13)
BUN SERPL-MCNC: 57 MG/DL (ref 5–25)
CALCIUM SERPL-MCNC: 8 MG/DL (ref 8.3–10.1)
CHLORIDE SERPL-SCNC: 107 MMOL/L (ref 100–108)
CO2 SERPL-SCNC: 19 MMOL/L (ref 21–32)
CREAT SERPL-MCNC: 6.28 MG/DL (ref 0.6–1.3)
ERYTHROCYTE [DISTWIDTH] IN BLOOD BY AUTOMATED COUNT: 16.2 % (ref 11.6–15.1)
GFR SERPL CREATININE-BSD FRML MDRD: 7 ML/MIN/1.73SQ M
GLUCOSE SERPL-MCNC: 169 MG/DL (ref 65–140)
HCT VFR BLD AUTO: 34.5 % (ref 34.8–46.1)
HGB BLD-MCNC: 10.3 G/DL (ref 11.5–15.4)
INR PPP: 1.2 (ref 0.84–1.19)
MCH RBC QN AUTO: 27.5 PG (ref 26.8–34.3)
MCHC RBC AUTO-ENTMCNC: 29.9 G/DL (ref 31.4–37.4)
MCV RBC AUTO: 92 FL (ref 82–98)
PLATELET # BLD AUTO: 146 THOUSANDS/UL (ref 149–390)
PLATELET # BLD AUTO: 154 THOUSANDS/UL (ref 149–390)
PMV BLD AUTO: 10.2 FL (ref 8.9–12.7)
PMV BLD AUTO: 11.1 FL (ref 8.9–12.7)
POTASSIUM SERPL-SCNC: 4.1 MMOL/L (ref 3.5–5.3)
PROTHROMBIN TIME: 14.6 SECONDS (ref 11.6–14.5)
RBC # BLD AUTO: 3.75 MILLION/UL (ref 3.81–5.12)
SODIUM SERPL-SCNC: 138 MMOL/L (ref 136–145)
WBC # BLD AUTO: 6.76 THOUSAND/UL (ref 4.31–10.16)

## 2019-12-26 PROCEDURE — 0J2TXYZ CHANGE OTHER DEVICE IN TRUNK SUBCUTANEOUS TISSUE AND FASCIA, EXTERNAL APPROACH: ICD-10-PCS | Performed by: RADIOLOGY

## 2019-12-26 PROCEDURE — 99219 PR INITIAL OBSERVATION CARE/DAY 50 MINUTES: CPT | Performed by: INTERNAL MEDICINE

## 2019-12-26 PROCEDURE — 36415 COLL VENOUS BLD VENIPUNCTURE: CPT | Performed by: EMERGENCY MEDICINE

## 2019-12-26 PROCEDURE — 80048 BASIC METABOLIC PNL TOTAL CA: CPT | Performed by: EMERGENCY MEDICINE

## 2019-12-26 PROCEDURE — 85027 COMPLETE CBC AUTOMATED: CPT | Performed by: PHYSICIAN ASSISTANT

## 2019-12-26 PROCEDURE — 99284 EMERGENCY DEPT VISIT MOD MDM: CPT | Performed by: EMERGENCY MEDICINE

## 2019-12-26 PROCEDURE — 99215 OFFICE O/P EST HI 40 MIN: CPT | Performed by: INTERNAL MEDICINE

## 2019-12-26 PROCEDURE — 85610 PROTHROMBIN TIME: CPT | Performed by: RADIOLOGY

## 2019-12-26 PROCEDURE — 85049 AUTOMATED PLATELET COUNT: CPT | Performed by: PHYSICIAN ASSISTANT

## 2019-12-26 PROCEDURE — 71046 X-RAY EXAM CHEST 2 VIEWS: CPT

## 2019-12-26 PROCEDURE — 99285 EMERGENCY DEPT VISIT HI MDM: CPT

## 2019-12-26 RX ORDER — ACETAMINOPHEN 325 MG/1
650 TABLET ORAL EVERY 6 HOURS PRN
Status: DISCONTINUED | OUTPATIENT
Start: 2019-12-26 | End: 2019-12-27 | Stop reason: HOSPADM

## 2019-12-26 RX ORDER — LABETALOL 100 MG/1
100 TABLET, FILM COATED ORAL 2 TIMES DAILY
Status: DISCONTINUED | OUTPATIENT
Start: 2019-12-26 | End: 2019-12-27 | Stop reason: HOSPADM

## 2019-12-26 RX ORDER — POLYETHYLENE GLYCOL 3350 17 G/17G
17 POWDER, FOR SOLUTION ORAL DAILY
Status: DISCONTINUED | OUTPATIENT
Start: 2019-12-26 | End: 2019-12-27 | Stop reason: HOSPADM

## 2019-12-26 RX ORDER — SEVELAMER HYDROCHLORIDE 800 MG/1
800 TABLET, FILM COATED ORAL
Status: DISCONTINUED | OUTPATIENT
Start: 2019-12-26 | End: 2019-12-27 | Stop reason: HOSPADM

## 2019-12-26 RX ORDER — ONDANSETRON 2 MG/ML
4 INJECTION INTRAMUSCULAR; INTRAVENOUS EVERY 4 HOURS PRN
Status: DISCONTINUED | OUTPATIENT
Start: 2019-12-26 | End: 2019-12-27 | Stop reason: HOSPADM

## 2019-12-26 RX ORDER — NITROGLYCERIN 0.4 MG/1
0.4 TABLET SUBLINGUAL
Status: DISCONTINUED | OUTPATIENT
Start: 2019-12-26 | End: 2019-12-27 | Stop reason: HOSPADM

## 2019-12-26 RX ORDER — KETOROLAC TROMETHAMINE 4 MG/ML
1 SOLUTION/ DROPS OPHTHALMIC 4 TIMES DAILY
Status: DISCONTINUED | OUTPATIENT
Start: 2019-12-26 | End: 2019-12-27 | Stop reason: HOSPADM

## 2019-12-26 RX ORDER — HEPARIN SODIUM 5000 [USP'U]/ML
5000 INJECTION, SOLUTION INTRAVENOUS; SUBCUTANEOUS EVERY 8 HOURS SCHEDULED
Status: DISCONTINUED | OUTPATIENT
Start: 2019-12-26 | End: 2019-12-27 | Stop reason: HOSPADM

## 2019-12-26 RX ORDER — FENTANYL 25 UG/H
1 PATCH TRANSDERMAL
Status: DISCONTINUED | OUTPATIENT
Start: 2019-12-26 | End: 2019-12-27 | Stop reason: HOSPADM

## 2019-12-26 RX ORDER — AMLODIPINE BESYLATE 10 MG/1
10 TABLET ORAL DAILY
Status: DISCONTINUED | OUTPATIENT
Start: 2019-12-26 | End: 2019-12-27 | Stop reason: HOSPADM

## 2019-12-26 RX ORDER — ATORVASTATIN CALCIUM 40 MG/1
40 TABLET, FILM COATED ORAL EVERY EVENING
Status: DISCONTINUED | OUTPATIENT
Start: 2019-12-26 | End: 2019-12-27 | Stop reason: HOSPADM

## 2019-12-26 RX ORDER — DOCUSATE SODIUM 100 MG/1
100 CAPSULE, LIQUID FILLED ORAL 2 TIMES DAILY
Status: DISCONTINUED | OUTPATIENT
Start: 2019-12-26 | End: 2019-12-27 | Stop reason: HOSPADM

## 2019-12-26 RX ORDER — ASPIRIN 81 MG/1
81 TABLET, CHEWABLE ORAL DAILY
Status: DISCONTINUED | OUTPATIENT
Start: 2019-12-26 | End: 2019-12-27 | Stop reason: HOSPADM

## 2019-12-26 RX ORDER — BUSPIRONE HYDROCHLORIDE 5 MG/1
5 TABLET ORAL 3 TIMES DAILY
Status: DISCONTINUED | OUTPATIENT
Start: 2019-12-26 | End: 2019-12-27 | Stop reason: HOSPADM

## 2019-12-26 RX ORDER — GUAIFENESIN 600 MG
600 TABLET, EXTENDED RELEASE 12 HR ORAL EVERY 12 HOURS SCHEDULED
Status: DISCONTINUED | OUTPATIENT
Start: 2019-12-26 | End: 2019-12-27 | Stop reason: HOSPADM

## 2019-12-26 RX ADMIN — BUSPIRONE HYDROCHLORIDE 5 MG: 5 TABLET ORAL at 16:00

## 2019-12-26 RX ADMIN — HEPARIN SODIUM 5000 UNITS: 5000 INJECTION INTRAVENOUS; SUBCUTANEOUS at 21:16

## 2019-12-26 RX ADMIN — FENTANYL 1 PATCH: 25 PATCH TRANSDERMAL at 13:30

## 2019-12-26 RX ADMIN — ASPIRIN 81 MG 81 MG: 81 TABLET ORAL at 13:29

## 2019-12-26 RX ADMIN — ATORVASTATIN CALCIUM 40 MG: 40 TABLET, FILM COATED ORAL at 17:23

## 2019-12-26 RX ADMIN — AMLODIPINE BESYLATE 10 MG: 10 TABLET ORAL at 13:29

## 2019-12-26 RX ADMIN — BUSPIRONE HYDROCHLORIDE 5 MG: 5 TABLET ORAL at 21:06

## 2019-12-26 RX ADMIN — GUAIFENESIN 600 MG: 600 TABLET, EXTENDED RELEASE ORAL at 21:06

## 2019-12-26 RX ADMIN — HEPARIN SODIUM 5000 UNITS: 5000 INJECTION INTRAVENOUS; SUBCUTANEOUS at 15:16

## 2019-12-26 RX ADMIN — LABETALOL HYDROCHLORIDE 100 MG: 100 TABLET, FILM COATED ORAL at 13:29

## 2019-12-26 RX ADMIN — SERTRALINE HYDROCHLORIDE 50 MG: 50 TABLET ORAL at 13:29

## 2019-12-26 RX ADMIN — ACETAMINOPHEN 650 MG: 325 TABLET, FILM COATED ORAL at 17:22

## 2019-12-26 RX ADMIN — LABETALOL HYDROCHLORIDE 100 MG: 100 TABLET, FILM COATED ORAL at 21:06

## 2019-12-26 NOTE — ED PROVIDER NOTES
History  Chief Complaint   Patient presents with    Vascular Access Problem     Per EMS, patient was in dialysis and they werer having frothing in the machine and believe that she has a disloged dialysis catheter  Stated that they saw some leakage around the site  EMS also states that she has a DNR/DNI/DNH     64-year-old female presents from dialysis as they are unable to access her left PermCath  Stating that they are getting a foamy return and is unable to be used  Patient has aphasia difficulty explaining things but states she has an otherwise no discomfort  Last dialyzed 2 days ago  Patient's Tuesday Thursday Saturday  No modifying or alleviating factors, as per paramedics they tried multiple attempts access at the dialysis center body was not working , they spoke to Nephrology who advised patient come to the hospital for change of her PermCath  History provided by:  Patient and EMS personnel      Prior to Admission Medications   Prescriptions Last Dose Informant Patient Reported?  Taking?   acetaminophen (TYLENOL) 325 mg tablet  Outside Facility (Specify) Yes No   Sig: Take 650 mg by mouth every 6 (six) hours as needed for mild pain   albuterol (2 5 mg/3 mL) 0 083 % nebulizer solution  Outside Facility (Specify) Yes No   Sig: Take 2 5 mg by nebulization every 6 (six) hours as needed for wheezing   albuterol (PROVENTIL HFA,VENTOLIN HFA) 90 mcg/act inhaler  Outside Facility (Specify) Yes No   Sig: Inhale 2 puffs every 6 (six) hours as needed for wheezing   amLODIPine (NORVASC) 5 mg tablet  Outside Facility (Specify) Yes No   Sig: Take 5 mg by mouth daily   aspirin 81 mg chewable tablet   No No   Sig: Chew 2 tablets (162 mg total) daily   atorvastatin (LIPITOR) 40 mg tablet   No No   Sig: Take 1 tablet (40 mg total) by mouth every evening   bisacodyl (DULCOLAX) 5 mg EC tablet  Outside Facility (Specify) Yes No   Sig: Take 5 mg by mouth daily as needed for constipation   busPIRone (BUSPAR) 5 mg tablet Outside Facility ([facility]) Yes No   Sig: Take 5 mg by mouth 3 (three) times a day   docusate sodium (COLACE) 100 mg capsule  Outside Facility (Specify) Yes No   Sig: Take 100 mg by mouth 2 (two) times a day   fentaNYL (DURAGESIC) 25 mcg/hr  Outside Facility (Specify) Yes No   Sig: Place 1 patch on the skin every third day   guaiFENesin (MUCINEX) 600 mg 12 hr tablet   No No   Sig: Take 1 tablet (600 mg total) by mouth every 12 (twelve) hours   ketorolac (ACULAR) 0 4 % SOLN  Outside Facility (Specify) Yes No   Si drop 4 (four) times a day   labetalol (NORMODYNE) 100 mg tablet  Outside Facility (Specify) Yes No   Sig: Take 100 mg by mouth 2 (two) times a day   nitroglycerin (NITROSTAT) 0 4 mg SL tablet  Outside Facility (Specify) Yes No   Sig: Place 0 4 mg under the tongue every 5 (five) minutes as needed for chest pain   polyethylene glycol (MIRALAX) 17 g packet  Outside Facility (Specify) Yes No   Sig: Take 17 g by mouth daily   sertraline (ZOLOFT) 25 mg tablet  Outside Facility (Specify) Yes No   Sig: Take 50 mg by mouth daily      Facility-Administered Medications: None       Past Medical History:   Diagnosis Date    Altered mental status, unspecified     Anemia     Atherosclerotic heart disease of native coronary artery without angina pectoris     Cancer (HCC)     Chest pain     Chronic diastolic (congestive) heart failure (HCC)     Dependence on renal dialysis (HCC)     Diabetes mellitus (HCC)     End-stage renal disease (HCC)     Gastro-esophageal reflux disease without esophagitis     Hyperlipidemia     Hypertension     Long term current use of anticoagulant     Long term current use of insulin (HCC)     Muscle weakness     Nausea with vomiting     Other abnormalities of gait and mobility     Other specified abnormal findings of blood chemistry     Type 2 diabetes mellitus (HCC)     Ventral hernia without obstruction or gangrene     Vitamin D deficiency        Past Surgical History: Procedure Laterality Date    AV FISTULA PLACEMENT      MASTECTOMY      R arm       History reviewed  No pertinent family history  I have reviewed and agree with the history as documented  Social History     Tobacco Use    Smoking status: Never Smoker    Smokeless tobacco: Never Used   Substance Use Topics    Alcohol use: No    Drug use: No        Review of Systems   Constitutional: Negative for activity change, chills, diaphoresis and fever  HENT: Negative for congestion, sinus pressure and sore throat  Eyes: Negative for pain and visual disturbance  Respiratory: Negative for cough, chest tightness, shortness of breath, wheezing and stridor  Cardiovascular: Negative for chest pain and palpitations  Gastrointestinal: Negative for abdominal distention, abdominal pain, constipation, diarrhea, nausea and vomiting  Genitourinary: Negative for dysuria and frequency  Musculoskeletal: Negative for neck pain and neck stiffness  Skin: Negative for rash  Neurological: Negative for dizziness, speech difficulty, light-headedness, numbness and headaches  Physical Exam  Physical Exam   Constitutional: She is oriented to person, place, and time  She appears well-developed  No distress  HENT:   Head: Normocephalic and atraumatic  Eyes: Pupils are equal, round, and reactive to light  Neck: Normal range of motion  Neck supple  No tracheal deviation present  Cardiovascular: Normal rate, regular rhythm, normal heart sounds and intact distal pulses  No murmur heard  Pulmonary/Chest: Effort normal and breath sounds normal  No stridor  No respiratory distress  Abdominal: Soft  She exhibits no distension  There is no tenderness  There is no rebound and no guarding  Musculoskeletal: Normal range of motion  Neurological: She is alert and oriented to person, place, and time  Skin: Skin is warm and dry  She is not diaphoretic  No erythema  No pallor     Psychiatric: She has a normal mood and affect  Vitals reviewed  Vital Signs  ED Triage Vitals [12/26/19 0912]   Temperature Pulse Respirations Blood Pressure SpO2   97 8 °F (36 6 °C) 66 14 140/67 100 %      Temp Source Heart Rate Source Patient Position - Orthostatic VS BP Location FiO2 (%)   Oral Monitor Lying Right arm --      Pain Score       No Pain           Vitals:    12/26/19 0912   BP: 140/67   Pulse: 66   Patient Position - Orthostatic VS: Lying         Visual Acuity      ED Medications  Medications - No data to display    Diagnostic Studies  Results Reviewed     Procedure Component Value Units Date/Time    Basic metabolic panel [[id number]]  (Abnormal) Collected:  12/26/19 1031    Lab Status:  Final result Specimen:  Blood Updated:  12/26/19 1046     Sodium 138 mmol/L      Potassium 4 1 mmol/L      Chloride 107 mmol/L      CO2 19 mmol/L      ANION GAP 12 mmol/L      BUN 57 mg/dL      Creatinine 6 28 mg/dL      Glucose 169 mg/dL      Calcium 8 0 mg/dL      eGFR 7 ml/min/1 73sq m     Narrative:       [place] guidelines for Chronic Kidney Disease (CKD):     Stage 1 with normal or high GFR (GFR > 90 mL/min/1 73 square meters)    Stage 2 Mild CKD (GFR = 60-89 mL/min/1 73 square meters)    Stage 3A Moderate CKD (GFR = 45-59 mL/min/1 73 square meters)    Stage 3B Moderate CKD (GFR = 30-44 mL/min/1 73 square meters)    Stage 4 Severe CKD (GFR = 15-29 mL/min/1 73 square meters)    Stage 5 End Stage CKD (GFR <15 mL/min/1 73 square meters)  Note: GFR calculation is accurate only with a steady state creatinine    CBC and Platelet [[id number]]     Lab Status:  No result Specimen:  Blood                  No orders to display              Procedures  Procedures         ED Course  ED Course as of Dec 26 1103   Thu Dec 26, 2019   Vivian Villegas, requesting IR change of catheter  , will contact      8301 Spoke with IR, unsure if they could and on today or have to do tomorrow  , potential admission because of this  6332 Discussed this with patient  , patient unsure she wants blood work or the catheter replaced  , patient is DNR DNI and apparently do not hospitalize as well , will contact patient's daughter and have much longer conversation about plan/goals of therapy      6264 Discussed case with patient's daughter who is power of , completely agreeable to allow for dialysis catheter change, patient now agreeable to this as well , will check blood work  MDM  Number of Diagnoses or Management Options  Complication of vascular dialysis catheter, unspecified complication, initial encounter: new and requires workup  Diagnosis management comments: Nontoxic in appearance, multiple talks with Nephrology as well as Interventional Radiology, they will try to replace her PermCath later today if not tomorrow       Amount and/or Complexity of Data Reviewed  Clinical lab tests: ordered and reviewed  Decide to obtain previous medical records or to obtain history from someone other than the patient: yes  Obtain history from someone other than the patient: yes  Review and summarize past medical records: yes  Discuss the patient with other providers: yes          Disposition  Final diagnoses:   Complication of vascular dialysis catheter, unspecified complication, initial encounter     Time reflects when diagnosis was documented in both MDM as applicable and the Disposition within this note     Time User Action Codes Description Comment    12/26/2019 10:59 AM Reshma Rinne Add [[id number]] Complication of vascular dialysis catheter, unspecified complication, initial encounter       ED Disposition     ED Disposition Condition Date/Time Comment    Admit Stable Thu Dec 26, 2019 11:03 AM Case was discussed with Dr Raynard Gilford and the patient's admission status was agreed to be Admission Status: observation status to the service of Dr Raynard Gilford           Follow-up Information    None         Patient's Medications   Discharge Prescriptions    No medications on file     No discharge procedures on file      ED Provider  Electronically Signed by           Korey Bedoya DO  12/26/19 4371

## 2019-12-26 NOTE — ASSESSMENT & PLAN NOTE
· Chronic cognitive impairment without signs of acute encephalopathy      · Apparently this is due to previous strokes according to patient's daughter's conversation with the ER  · Patient is wheelchair bound and resides at [facility]

## 2019-12-26 NOTE — CONSULTS
[address] [facility] 81 y o  female MRN: [id number]  Unit/Bed#: S -01 Encounter: [id number]    ASSESSMENT and PLAN:  1  ESRD on HD  · Patient with history of ESRD on chronic hemodialysis  Gets HD at [facility] every Tuesdays, Thursdays and Saturdays  Dry weight goal of 87kg  · Access: Left IJ cath  · Came in from dialysis session, noted to have vascular access problem, left PermCath  IR consulted for replacement, unable to do today  · Creatinine at 6 28, with baseline of around 6  · Patient hemodynamically stable, electrolytes wnl, no urgent indications for HD today  · Will schedule for HD tomorrow, dialysis orders placed  2  Hypertension  · Stable, latest /77  · Usual BP per chart review post HD around 857-199O systolic  · Continue home medications Amlodipine 10mg daily and labetalol 100mg BID  · Monitor BP    3  Anemia  · Latest hemoglobin level of 10 2, on target  · Patient receiving Venofer once a week  · Monitor CBC      SUMMARY OF RECOMMENDATIONS:  IR consult for PermCath replacement, plan for HD tomorrow  HISTORY OF PRESENT ILLNESS:  Requesting Physician: Aquiles Hernandez DO  Reason for Consult: ESRD on HD    Angeles Estrada is a 81 y o  female who was admitted to [facility] via EMS from HD session today after presenting with vascular access problem  A renal consultation is requested today for assistance in the management of ESRD  Angeles Estrada is a known ESRD patient who undergoes maintenance hemodialysis at [facility] on Tuesdays, Thursdays and Saturdays  Patient was at [facility] today undergoing her hemodialysis when they encountered malfunctioning PermCath stating there was frothy return and they were not able to use it  Patient denies any chest pain or shortness of breath  Patient was then brought in via EMS to the ED for evaluation of vascular access      At the ED, IR was consulted for possible replacement of PermCath however they are not able to do it today  Patient was then admitted for further evaluation and management awaiting schedule for vascular access placement  Patient is hemodynamically stable, does not appear to be fluid overloaded and with no other indications for urgent HD today  Patient will be scheduled for HD tomorrow after PermCath placement  PAST MEDICAL HISTORY:  Past Medical History:   Diagnosis Date    Altered mental status, unspecified     Anemia     Atherosclerotic heart disease of native coronary artery without angina pectoris     Cancer (HCC)     Chest pain     Chronic diastolic (congestive) heart failure (HCC)     Dependence on renal dialysis ([facility] [address] )     Diabetes mellitus (HCC)     End-stage renal disease (HCC)     Gastro-esophageal reflux disease without esophagitis     Hyperlipidemia     Hypertension     Long term current use of anticoagulant     Long term current use of insulin (HCC)     Muscle weakness     Nausea with vomiting     Other abnormalities of gait and mobility     Other specified abnormal findings of blood chemistry     Type 2 diabetes mellitus (HCC)     Ventral hernia without obstruction or gangrene     Vitamin D deficiency        PAST SURGICAL HISTORY:  Past Surgical History:   Procedure Laterality Date    AV FISTULA PLACEMENT      MASTECTOMY      R arm       ALLERGIES:  Allergies   Allergen Reactions    Percolone [Oxycodone]     Sulfa Antibiotics        SOCIAL HISTORY:  Social History     Substance and Sexual Activity   Alcohol Use No     Social History     Substance and Sexual Activity   Drug Use No     Social History     Tobacco Use   Smoking Status Never Smoker   Smokeless Tobacco Never Used       FAMILY HISTORY:  History reviewed  No pertinent family history      MEDICATIONS:    Current Facility-Administered Medications:     acetaminophen (TYLENOL) tablet 650 mg, 650 mg, Oral, Q6H PRN, Christin Johnson PA-C    amLODIPine (NORVASC) tablet 10 mg, 10 mg, Oral, Daily, Christin Johnson NALINI, 10 mg at 12/26/19 1329    aspirin chewable tablet 81 mg, 81 mg, Oral, Daily, Christin Johnson PA-C, 81 mg at 12/26/19 1329    atorvastatin (LIPITOR) tablet 40 mg, 40 mg, Oral, QPM, Christin Johnson PA-C    bisacodyl (DULCOLAX) EC tablet 5 mg, 5 mg, Oral, Daily PRN, Christin Johnson PA-C    busPIRone (BUSPAR) tablet 5 mg, 5 mg, Oral, TID, Christin Johnson PA-C    docusate sodium (COLACE) capsule 100 mg, 100 mg, Oral, BID, Christin Johnson PA-C    doxercalciferol (HECTOROL) injection 1 mcg, 1 mcg, Intravenous, After Dialysis, Scarlet Primrose, MD    fentaNYL (DURAGESIC) 25 mcg/hr TD 72 hr patch 1 patch, 1 patch, Transdermal, Q72H, Christin Johnson PA-C, 1 patch at 12/26/19 1330    guaiFENesin (MUCINEX) 12 hr tablet 600 mg, 600 mg, Oral, Q12H [address], Christin Johnson PA-C    heparin (porcine) subcutaneous injection 5,000 Units, 5,000 Units, Subcutaneous, Q8H [address], 5,000 Units at 12/26/19 1516 **AND** Platelet count, , , Once, Christin Johnson PA-C    ketorolac (ACULAR) 0 4 % ophthalmic solution 1 drop, 1 drop, Both Eyes, 4x Daily, Christin Johnson PA-C    labetalol (NORMODYNE) tablet 100 mg, 100 mg, Oral, BID, Christin Johnson PA-C, 100 mg at 12/26/19 1329    nitroglycerin (NITROSTAT) SL tablet 0 4 mg, 0 4 mg, Sublingual, Q5 Min PRN, Christin Johnson PA-C    ondansetron (ZOFRAN) injection 4 mg, 4 mg, Intravenous, Q4H PRN, Christin Johnson PA-C    polyethylene glycol (MIRALAX) packet 17 g, 17 g, Oral, Daily, Christin Johnson PA-C    sertraline (ZOLOFT) tablet 50 mg, 50 mg, Oral, Daily, Christin Johnson PA-C, 50 mg at 12/26/19 1329    sevelamer (RENAGEL) tablet 800 mg, 800 mg, Oral, TID With Meals, Christin Johnson PA-C    Review of Systems   Constitutional: Negative for chills, fatigue and fever  HENT: Negative for ear discharge, tinnitus and trouble swallowing  Eyes: Negative for pain and visual disturbance  Respiratory: Negative for cough, chest tightness and shortness of breath  Cardiovascular: Positive for leg swelling  Negative for chest pain and palpitations  Gastrointestinal: Negative for abdominal pain, nausea and vomiting  Genitourinary: Negative for dysuria and hematuria  Musculoskeletal: Positive for gait problem  Negative for neck pain and neck stiffness  Skin: Negative for color change, pallor and rash  Neurological: Negative for dizziness and numbness  Hematological: Negative for adenopathy  Psychiatric/Behavioral: Negative for confusion and hallucinations  PHYSICAL EXAM:  Current Weight: Weight - Scale: 94 2 kg (207 lb 10 8 oz)  First Weight: Weight - Scale: 94 2 kg (207 lb 10 8 oz)  Vitals:    12/26/19 0912 12/26/19 1300 12/26/19 1434   BP: 140/67 154/80 157/77   BP Location: Right arm Right arm Left arm   Pulse: 66 76 59   Resp: 14 16 18   Temp: 97 8 °F (36 6 °C) 97 7 °F (36 5 °C) 98 4 °F (36 9 °C)   TempSrc: Oral Oral Oral   SpO2: 100% 92% 100%   Weight: 94 2 kg (207 lb 10 8 oz)       No intake or output data in the 24 hours ending 12/26/19 1623  Physical Exam   Constitutional: She appears well-developed and well-nourished  No distress  HENT:   Head: Normocephalic  Mouth/Throat: Oropharynx is clear and moist  No oropharyngeal exudate  Eyes: Pupils are equal, round, and reactive to light  Conjunctivae are normal  No scleral icterus  Neck: Normal range of motion  No JVD present  Cardiovascular: Normal rate, regular rhythm and normal heart sounds  Pulmonary/Chest: Effort normal and breath sounds normal  No respiratory distress  (+) PermCath access left upper chest, no erythema/discharge noted   Abdominal: Soft  Bowel sounds are normal  There is no tenderness  There is no guarding  Musculoskeletal: Normal range of motion  She exhibits edema  She exhibits no tenderness  Lymphadenopathy:     She has no cervical adenopathy  Neurological: She is alert  No cranial nerve deficit or sensory deficit  She exhibits normal muscle tone     Poor historian, awake and alert, responds to questions and follows commands   Skin: Skin is warm  No rash noted  She is not diaphoretic  No erythema  Psychiatric: She has a normal mood and affect  Her behavior is normal    Nursing note and vitals reviewed  Invasive Devices:      Lab Results:   Results from last 7 days   Lab Units 12/26/19  1031   POTASSIUM mmol/L 4 1   CHLORIDE mmol/L 107   CO2 mmol/L 19*   BUN mg/dL 57*   CREATININE mg/dL 6 28*   CALCIUM mg/dL 8 0*     Lab Results   Component Value Date    CALCIUM 8 0 (L) 12/26/2019    PHOS 2 8 01/08/2019     Other Studies:  Chest x-ray:  Borderline cardiomegaly, mild pulmonary vascular congestion  Left IJ catheter in place    No pneumothorax identified

## 2019-12-26 NOTE — H&P
H&P- Lisa Gutierrez 1935, 81 y o  female MRN: [id number]    Unit/Bed#: ED 12 Encounter: [id number]    Primary Care Provider: Joel Dickey MD   Date and time admitted to hospital: 12/26/2019  9:08 AM  * Problem with vascular access  Assessment & Plan  · See note below    ESRD on hemodialysis [facility])  Assessment & Plan  · Patient with end-stage renal disease on hemodialysis Tuesday, Thursday, and Saturday; she presented for her regular HD session today at [facility] but PermCath was nonfunctioning and she was sent over to the hospital for evaluation  Case was already discussed with Interventional Radiology and Nephrology by the ER attending and unfortunately based on a full IR scheduled today, patient will not be able to get this catheter adjusted today  · Will observe patient overnight for any signs of clinical decompensation/volume overload or significant electrolyte abnormalities and hopefully get catheter corrected tomorrow to provide dialysis    Essential hypertension  Assessment & Plan  · Continue home medication doses (norvasc 10 mg daily and labetalol 100 bid)    Cognitive impairment  Assessment & Plan  · Chronic cognitive impairment without signs of acute encephalopathy  · Apparently this is due to previous strokes according to patient's daughter's conversation with the ER  · Patient is wheelchair bound and resides at [facility]    Continuous opioid dependence ([facility] [address] )  Assessment & Plan  · On fentanyl with associated bowel regimen    VTE Prophylaxis: Heparin  / sequential compression device   Code Status: DNR per records  POLST: There is no POLST form on file for this patient (pre-hospital)  Discussion with family:     Anticipated Length of Stay:  Patient will be admitted on an Observation basis with an anticipated length of stay of  Less than 2 midnights     Justification for Hospital Stay:  Patient needs her PermCath corrected so she can have hemodialysis but unfortunately based on the busy schedule in Interventional Radiology this could not be done in the ER  Hopefully return to nursing home tomorrow after dialysis    Total Time for Visit, including Counseling / Coordination of Care: 45 minutes  Greater than 50% of this total time spent on direct patient counseling and coordination of care  Chief Complaint:   Dialysis catheter not functioning    History of Present Illness:    Enid Snow is a 81 y o  female who presents with nonfunctioning dialysis catheter  Patient has end-stage renal disease and goes for dialysis sessions on Tuesday, Thursday, and Saturday and when she presented today for her outpatient dialysis day noted that her catheter was not working and could not do her session  She was sent over to the emergency room but unfortunately Interventional Radiology did not have availability in their schedule to correct the catheter today and therefore patient is recommended for admission  Currently she is not exhibiting signs of volume overload but unfortunately she is a very poor historian overall  Review of Systems:    Review of Systems   Constitutional: Negative for activity change, appetite change, chills, diaphoresis, fatigue, fever and unexpected weight change  HENT: Negative for trouble swallowing  Respiratory: Negative for cough, chest tightness and shortness of breath  Cardiovascular: Negative for chest pain and palpitations  Gastrointestinal: Positive for constipation (history of)  Negative for abdominal pain, blood in stool, diarrhea, nausea and vomiting  Genitourinary: Negative for dysuria  Musculoskeletal: Positive for gait problem (Non ambulatory, uses wheelchair)  Skin: Negative for color change, pallor, rash and wound  Neurological: Negative for dizziness and headaches  Psychiatric/Behavioral: Negative for confusion          Chronic cognitive impairment       Past Medical and Surgical History:     Past Medical History:   Diagnosis Date    Altered mental status, unspecified     Anemia     Atherosclerotic heart disease of native coronary artery without angina pectoris     Cancer [facility])     Chest pain     Chronic diastolic (congestive) heart failure (HCC)     Dependence on [facility] dialysis ([facility] )     Diabetes mellitus ([place] [address] )     End-stage renal disease ([place] [address] )     Gastro-esophageal reflux disease without esophagitis     Hyperlipidemia     Hypertension     Long term current use of anticoagulant     [facility] term current use of insulin ([facility])     Muscle weakness     Nausea with vomiting     Other abnormalities of gait and mobility     Other specified abnormal findings of blood chemistry     Type 2 diabetes mellitus ([place] [address] )     Ventral hernia without obstruction or gangrene     Vitamin D deficiency        Past Surgical History:   Procedure Laterality Date    AV FISTULA PLACEMENT      MASTECTOMY      R arm       Meds/Allergies:  I specifically reviewed the med list sent over from her dialysis unit  On this it is noted that she takes Norvasc 10 mg daily    Prior to Admission medications    Medication Sig Start Date End Date Taking?  Authorizing Provider   acetaminophen (TYLENOL) 325 mg tablet Take 650 mg by mouth every 6 (six) hours as needed for mild pain    Historical Provider, MD   albuterol (2 5 mg/3 mL) 0 083 % nebulizer solution Take 2 5 mg by nebulization every 6 (six) hours as needed for wheezing    Historical Provider, MD   albuterol (PROVENTIL HFA,VENTOLIN HFA) 90 mcg/act inhaler Inhale 2 puffs every 6 (six) hours as needed for wheezing    Historical Provider, MD   amLODIPine (NORVASC) 5 mg tablet Take 5 mg by mouth daily    Historical Provider, MD   aspirin 81 mg chewable tablet Chew 2 tablets (162 mg total) daily 1/9/19   Kiara Noyola PA-C   atorvastatin (LIPITOR) 40 mg tablet Take 1 tablet (40 mg total) by mouth every evening 1/8/19   Kiara Noyola PA-C   bisacodyl (DULCOLAX) 5 mg EC tablet Take 5 mg by mouth daily as needed for constipation    Historical Provider, MD   busPIRone (BUSPAR) 5 mg tablet Take 5 mg by mouth 3 (three) times a day    Historical Provider, MD   docusate sodium (COLACE) 100 mg capsule Take 100 mg by mouth 2 (two) times a day    Historical Provider, MD   fentaNYL (DURAGESIC) 25 mcg/hr Place 1 patch on the skin every third day    Historical Provider, MD   guaiFENesin (MUCINEX) 600 mg 12 hr tablet Take 1 tablet (600 mg total) by mouth every 12 (twelve) hours 1/8/19   Kiara Noyola PA-C   ketorolac (ACULAR) 0 4 % SOLN 1 drop 4 (four) times a day    Historical Provider, MD   labetalol (NORMODYNE) 100 mg tablet Take 100 mg by mouth 2 (two) times a day    Historical Provider, MD   nitroglycerin (NITROSTAT) 0 4 mg SL tablet Place 0 4 mg under the tongue every 5 (five) minutes as needed for chest pain    Historical Provider, MD   polyethylene glycol (MIRALAX) 17 g packet Take 17 g by mouth daily    Historical Provider, MD   sertraline (ZOLOFT) 25 mg tablet Take 50 mg by mouth daily    Historical Provider, MD YO have reviewed home medications using allscripts  Allergies:    Allergies   Allergen Reactions    Percolone [Oxycodone]     Sulfa Antibiotics        Social History:     Marital Status:    Occupation: none  Patient Pre-hospital Living Situation: [place]  Patient Pre-hospital Level of Mobility:  Nonambulatory  Patient Pre-hospital Diet Restrictions:   Substance Use History:   Social History     Substance and Sexual Activity   Alcohol Use No     Social History     Tobacco Use   Smoking Status Never Smoker   Smokeless Tobacco Never Used     Social History     Substance and Sexual Activity   Drug Use No       Family History:    Non contributory    Physical Exam:     Vitals:   Blood Pressure: 140/67 (12/26/19 0912)  Pulse: 66 (12/26/19 0912)  Temperature: 97 8 °F (36 6 °C) (12/26/19 0912)  Temp Source: Oral (12/26/19 0912)  Respirations: 14 (12/26/19 0912)  Weight - Scale: 94 2 kg (207 lb 10 8 oz) (12/26/19 0912)  SpO2: 100 % (12/26/19 0912)    Physical Exam   Constitutional: She appears well-developed and well-nourished  No distress  Morbidly obese elderly  female seen lying in bed comfortably, clinically nontoxic appearing   HENT:   Head: Normocephalic and atraumatic  Eyes: Pupils are equal, round, and reactive to light  Conjunctivae are normal  Right eye exhibits no discharge  Left eye exhibits no discharge  No scleral icterus  Neck:   Large, Golf ball sized mass noted in her left neck just below her jaw line  This is nontender and mobile inpatient reports it to be chronic   Cardiovascular: Normal rate, regular rhythm and normal heart sounds  No murmur heard  Pulmonary/Chest: No stridor  No respiratory distress  She has no wheezes  No dyspnea or tachypnea, patient is lying flat in bed without any evidence of orthopnea  She provides very poor effort on lung exam  Left chest perm cath in place   Abdominal: Soft  Bowel sounds are normal  She exhibits no distension  There is no tenderness  There is no guarding  Soft, ventral hernia with healed midline scar   Musculoskeletal: She exhibits edema (Bilateral lower extremity edema noted)  She exhibits no tenderness or deformity  Neurological: She is alert  Awake alert but very slow historian  She is oriented to current date appropriately but did not know the exact locations though she was able to tell me that we were in the hospital    Skin: Skin is warm and dry  No rash noted  She is not diaphoretic  No erythema  No pallor  Psychiatric:   Flat affect, does not provide eye contact   Vitals reviewed  Additional Data:     Lab Results: I have personally reviewed pertinent reports            Results from last 7 days   Lab Units 12/26/19  1031   SODIUM mmol/L 138   POTASSIUM mmol/L 4 1   CHLORIDE mmol/L 107   CO2 mmol/L 19*   BUN mg/dL 57*   CREATININE mg/dL 6 28*   ANION GAP mmol/L 12   CALCIUM mg/dL 8 0*   GLUCOSE RANDOM mg/dL 169*                       Imaging: I have personally reviewed pertinent reports  IR consult    (Results Pending)       EKG, Pathology, and Other Studies Reviewed on Admission:       [facility] Yes     ** Please Note: This note has been constructed using a voice recognition system   **

## 2019-12-26 NOTE — PLAN OF CARE
Problem: Prexisting or High Potential for Compromised Skin Integrity  Goal: Skin integrity is maintained or improved  Description  INTERVENTIONS:  - Identify patients at risk for skin breakdown  - Assess and monitor skin integrity  - Assess and monitor nutrition and hydration status  - Monitor labs   - Assess for incontinence   - Turn and reposition patient  - Assist with mobility/ambulation  - Relieve pressure over bony prominences  - Avoid friction and shearing  - Provide appropriate hygiene as needed including keeping skin clean and dry  - Evaluate need for skin moisturizer/barrier cream  - Collaborate with interdisciplinary team   - Patient/family teaching  - Consider wound care consult   Outcome: Progressing     Problem: Potential for Falls  Goal: Patient will remain free of falls  Description  INTERVENTIONS:  - Assess patient frequently for physical needs  -  Identify cognitive and physical deficits and behaviors that affect risk of falls    -  [place] fall precautions as indicated by assessment   - Educate patient/family on patient safety including physical limitations  - Instruct patient to call for assistance with activity based on assessment  - Modify environment to reduce risk of injury  - Consider OT/PT consult to assist with strengthening/mobility  Outcome: Progressing     Problem: PAIN - ADULT  Goal: Verbalizes/displays adequate comfort level or baseline comfort level  Description  Interventions:  - Encourage patient to monitor pain and request assistance  - Assess pain using appropriate pain scale  - Administer analgesics based on type and severity of pain and evaluate response  - Implement non-pharmacological measures as appropriate and evaluate response  - Consider cultural and social influences on pain and pain management  - Notify physician/advanced practitioner if interventions unsuccessful or patient reports new pain  Outcome: Progressing     Problem: INFECTION - ADULT  Goal: Absence or prevention of progression during hospitalization  Description  INTERVENTIONS:  - Assess and monitor for signs and symptoms of infection  - Monitor lab/diagnostic results  - Monitor all insertion sites, i e  indwelling lines, tubes, and drains  - Monitor endotracheal if appropriate and nasal secretions for changes in amount and color  - [place] appropriate cooling/warming therapies per order  - Administer medications as ordered  - Instruct and encourage patient and family to use good hand hygiene technique  - Identify and instruct in appropriate isolation precautions for identified infection/condition  Outcome: Progressing  Goal: Absence of fever/infection during neutropenic period  Description  INTERVENTIONS:  - Monitor WBC    Outcome: Progressing

## 2019-12-26 NOTE — ASSESSMENT & PLAN NOTE
· Patient with end-stage renal disease on hemodialysis Tuesday, Thursday, and Saturday; she presented for her regular HD session today at [facility] but PermCath was nonfunctioning and she was sent over to the hospital for evaluation    Case was already discussed with Interventional Radiology and Nephrology by the ER attending and unfortunately based on a full IR scheduled today, patient will not be able to get this catheter adjusted today  · Will observe patient overnight for any signs of clinical decompensation/volume overload or significant electrolyte abnormalities and hopefully get catheter corrected tomorrow to provide dialysis

## 2019-12-27 ENCOUNTER — APPOINTMENT (OUTPATIENT)
Dept: DIALYSIS | Facility: HOSPITAL | Age: 84
DRG: 193 | End: 2019-12-27
Payer: MEDICARE

## 2019-12-27 ENCOUNTER — TELEPHONE (OUTPATIENT)
Dept: OTHER | Facility: OTHER | Age: 84
End: 2019-12-27

## 2019-12-27 ENCOUNTER — APPOINTMENT (OUTPATIENT)
Dept: RADIOLOGY | Facility: HOSPITAL | Age: 84
DRG: 193 | End: 2019-12-27
Payer: MEDICARE

## 2019-12-27 VITALS
TEMPERATURE: 97.8 F | WEIGHT: 207.67 LBS | SYSTOLIC BLOOD PRESSURE: 115 MMHG | HEART RATE: 70 BPM | BODY MASS INDEX: 36.79 KG/M2 | OXYGEN SATURATION: 98 % | DIASTOLIC BLOOD PRESSURE: 49 MMHG | RESPIRATION RATE: 18 BRPM

## 2019-12-27 LAB
ANION GAP SERPL CALCULATED.3IONS-SCNC: 11 MMOL/L (ref 4–13)
BUN SERPL-MCNC: 66 MG/DL (ref 5–25)
CALCIUM SERPL-MCNC: 8.7 MG/DL (ref 8.3–10.1)
CHLORIDE SERPL-SCNC: 107 MMOL/L (ref 100–108)
CO2 SERPL-SCNC: 24 MMOL/L (ref 21–32)
CREAT SERPL-MCNC: 7.59 MG/DL (ref 0.6–1.3)
GFR SERPL CREATININE-BSD FRML MDRD: 5 ML/MIN/1.73SQ M
GLUCOSE P FAST SERPL-MCNC: 96 MG/DL (ref 65–99)
GLUCOSE SERPL-MCNC: 96 MG/DL (ref 65–140)
POTASSIUM SERPL-SCNC: 4.8 MMOL/L (ref 3.5–5.3)
SODIUM SERPL-SCNC: 142 MMOL/L (ref 136–145)

## 2019-12-27 PROCEDURE — 99152 MOD SED SAME PHYS/QHP 5/>YRS: CPT | Performed by: RADIOLOGY

## 2019-12-27 PROCEDURE — 36581 REPLACE TUNNELED CV CATH: CPT | Performed by: RADIOLOGY

## 2019-12-27 PROCEDURE — C1725 CATH, TRANSLUMIN NON-LASER: HCPCS

## 2019-12-27 PROCEDURE — 77001 FLUOROGUIDE FOR VEIN DEVICE: CPT

## 2019-12-27 PROCEDURE — 99153 MOD SED SAME PHYS/QHP EA: CPT

## 2019-12-27 PROCEDURE — 99214 OFFICE O/P EST MOD 30 MIN: CPT | Performed by: INTERNAL MEDICINE

## 2019-12-27 PROCEDURE — 36581 REPLACE TUNNELED CV CATH: CPT

## 2019-12-27 PROCEDURE — 80048 BASIC METABOLIC PNL TOTAL CA: CPT | Performed by: PHYSICIAN ASSISTANT

## 2019-12-27 PROCEDURE — 99217 PR OBSERVATION CARE DISCHARGE MANAGEMENT: CPT | Performed by: PHYSICIAN ASSISTANT

## 2019-12-27 PROCEDURE — 99152 MOD SED SAME PHYS/QHP 5/>YRS: CPT

## 2019-12-27 PROCEDURE — G0257 UNSCHED DIALYSIS ESRD PT HOS: HCPCS

## 2019-12-27 PROCEDURE — C1769 GUIDE WIRE: HCPCS

## 2019-12-27 PROCEDURE — 77001 FLUOROGUIDE FOR VEIN DEVICE: CPT | Performed by: RADIOLOGY

## 2019-12-27 RX ORDER — ASPIRIN 81 MG/1
81 TABLET, CHEWABLE ORAL DAILY
Qty: 30 TABLET | Refills: 0
Start: 2019-12-27

## 2019-12-27 RX ORDER — SEVELAMER HYDROCHLORIDE 800 MG/1
800 TABLET, FILM COATED ORAL
Refills: 0
Start: 2019-12-27

## 2019-12-27 RX ORDER — AMLODIPINE BESYLATE 10 MG/1
10 TABLET ORAL DAILY
Refills: 0
Start: 2019-12-28

## 2019-12-27 RX ORDER — FENTANYL CITRATE 50 UG/ML
INJECTION, SOLUTION INTRAMUSCULAR; INTRAVENOUS CODE/TRAUMA/SEDATION MEDICATION
Status: COMPLETED | OUTPATIENT
Start: 2019-12-27 | End: 2019-12-27

## 2019-12-27 RX ORDER — MIDAZOLAM HYDROCHLORIDE 2 MG/2ML
INJECTION, SOLUTION INTRAMUSCULAR; INTRAVENOUS CODE/TRAUMA/SEDATION MEDICATION
Status: COMPLETED | OUTPATIENT
Start: 2019-12-27 | End: 2019-12-27

## 2019-12-27 RX ADMIN — ACETAMINOPHEN 650 MG: 325 TABLET, FILM COATED ORAL at 14:16

## 2019-12-27 RX ADMIN — GUAIFENESIN 600 MG: 600 TABLET, EXTENDED RELEASE ORAL at 10:03

## 2019-12-27 RX ADMIN — MIDAZOLAM HYDROCHLORIDE 0.5 MG: 1 INJECTION, SOLUTION INTRAMUSCULAR; INTRAVENOUS at 09:10

## 2019-12-27 RX ADMIN — SERTRALINE HYDROCHLORIDE 50 MG: 50 TABLET ORAL at 10:04

## 2019-12-27 RX ADMIN — FENTANYL CITRATE 25 MCG: 50 INJECTION INTRAMUSCULAR; INTRAVENOUS at 09:10

## 2019-12-27 RX ADMIN — HEPARIN SODIUM 5000 UNITS: 5000 INJECTION INTRAVENOUS; SUBCUTANEOUS at 13:08

## 2019-12-27 RX ADMIN — AMLODIPINE BESYLATE 10 MG: 10 TABLET ORAL at 10:04

## 2019-12-27 RX ADMIN — BUSPIRONE HYDROCHLORIDE 5 MG: 5 TABLET ORAL at 10:04

## 2019-12-27 RX ADMIN — LABETALOL HYDROCHLORIDE 100 MG: 100 TABLET, FILM COATED ORAL at 10:05

## 2019-12-27 RX ADMIN — ASPIRIN 81 MG 81 MG: 81 TABLET ORAL at 10:04

## 2019-12-27 NOTE — SOCIAL WORK
CM received notification from Eric Salcido at [facility] that they will be doing the transport at 5:00 PM     CM spoke with nephrology CRNP prior to confirming this as patient was just getting started with her HD treatment at 1:30 PM   CM was told that treatment was shortened to 2 5 hours as patient will get her normal HD treatment tomorrow  CM updated primary nurse, patient and family, and Rufus Mohan  Medical necessity was completed and placed in label book and a second copy was placed in medical records for scanning  CM reviewed the availability of treatment team to discuss questions or concerns patient and/or family may have regarding  understanding medications and recognizing signs and symptoms once discharged  CM also encouraged patient to follow up with all recommended appointments after discharge  Patient advised of importance for patient and family to participate in managing patients medical well being

## 2019-12-27 NOTE — UTILIZATION REVIEW
Initial Clinical Review    Admission: Date/Time/Statement: OBSERVATION 12/26/19 @1102  Orders Placed This Encounter   Procedures    Place in Observation (expected length of stay for this patient is less than two midnights)     Standing Status:   Standing     Number of Occurrences:   1     Order Specific Question:   Admitting Physician     Answer:   Mian Main     Order Specific Question:   Level of Care     Answer:   Med Surg [16]     ED Arrival Information     Expected Arrival Acuity Means of Arrival Escorted By Service Admission Type    - 12/26/2019 09:08 Urgent Ambulance [facility] Ambulance General Medicine Urgent    Arrival Complaint    -        Chief Complaint   Patient presents with    Vascular Access Problem     Per EMS, patient was in dialysis and they werer having frothing in the machine and believe that she has a disloged dialysis catheter  Stated that they saw some leakage around the site  EMS also states that she has a DNR/DNI/DNH     Assessment/Plan: 81 y o  female to ED by EMS from dialysis center admitted under observation due to nonfunctional dialysis catheter  Presents with nonfunctioning dialysis catheter  Patient has end-stage renal disease and goes for dialysis sessions on Tuesday, Thursday, and Saturday and when she presented today for her outpatient dialysis day noted that her catheter was not working and could not do her session  She was sent over to the emergency room but unfortunately Interventional Radiology did not have availability in their schedule to correct the catheter today and therefore patient is recommended for admission  Currently she is not exhibiting signs of volume overload but unfortunately she is a very poor historian overall    ESRD on hemodialysis [facility])  Assessment & Plan  · Patient with end-stage renal disease on hemodialysis Tuesday, Thursday, and Saturday; she presented for her regular HD session today at [facility] but PermCat was nonfunctioning and she was sent over to the hospital for evaluation  Case was already discussed with Interventional Radiology and Nephrology by the ER attending and unfortunately based on a full IR scheduled today, patient will not be able to get this catheter adjusted today  · Will observe patient overnight for any signs of clinical decompensation/volume overload or significant electrolyte abnormalities and hopefully get catheter corrected tomorrow to provide dialysis    Patient will be admitted on an Observation basis with an anticipated length of stay of  Less than 2 midnights  Justification for Hospital Stay:  Patient needs her PermCath corrected so she can have hemodialysis but unfortunately based on the busy schedule in Interventional Radiology this could not be done in the ER  Hopefully return to nursing home tomorrow after dialysis    12/26 per renal:  28-year-old female with ESRD on HD on hemodialysis at [facility] T[facility] presented with malfunction of permanent dialysis catheter ? Frothy return and we not able to use it  IR consulted for change of PermCath which is planned for 12/27  · ESRD on HD:  [facility] as outpatient dialysis unit  Dry weight 87 0 kg  Plan for next dialysis treatment tomorrow after change of PermCath which is currently not working  IR has been consulted  Electrolytes are stable  Has slight lower extremity edema, will plan for ultrafiltration to target weight  · Primary hypertension:  Blood pressure acceptable  Continue home medication  · Anemia due to ESRD:  Hemoglobin is at goal   Sandra Parker as outpatient and last dose on 12/23  Continue to monitor hemoglobin  · CKD/MBD/secondary hyperparathyroidism of renal origin-continue Renagel as well as Hectorol  Fluid overload/lower extremity edema-plan for ultrafiltration on hemodialysis treatment tomorrow  12/27 per renal: 28-year-old female with end-stage renal disease who presented with malfunction of PermCath    1  ESRD: · Patient on hemodialysis TTS at [facility]  · Hemodialysis today-planned for this afternoon  2  Access:  New PermCath inserted 12/27/2019  3  Hypertension:  Blood pressure elevated pre treatment  Monitor with volume removal   Usual blood pressure 662-677 systolic following treatment  Continue home regime which includes amlodipine and labetalol    Anemia:  Hemoglobin at goal   On Venofer weekly      Being discharged      ED Triage Vitals [12/26/19 0912]   Temperature Pulse Respirations Blood Pressure SpO2   97 8 °F (36 6 °C) 66 14 140/67 100 %      Temp Source Heart Rate Source Patient Position - Orthostatic VS BP Location FiO2 (%)   Oral Monitor Lying Right arm --      Pain Score       No Pain        Wt Readings from Last 1 Encounters:   12/26/19 94 2 kg (207 lb 10 8 oz)     Additional Vital Signs:   Date/Time  Temp  Pulse  Resp  BP  MAP (mmHg)  SpO2  O2 Device  Patient Position - Orthostatic VS   12/27/19 0930  --  84  16  202/91Abnormal   130  100 %  Nasal cannula  --   12/27/19 0924  --  72  14  172/77Abnormal   117  100 %  Nasal cannula  --   12/27/19 0920  --  73  14  172/79Abnormal   114  100 %  Nasal cannula  --   12/27/19 0915  --  73  14  175/79Abnormal   114  100 %  Nasal cannula  --   12/27/19 0910  --  79  16  197/69Abnormal   120  100 %  Nasal cannula  --   12/27/19 0905  --  78  16  203/91Abnormal   131  100 %  Nasal cannula  --   12/27/19 0900  --  74  20  210/93Abnormal   133  97 %  None (Room air)  --   12/27/19 0855  --  --  --  192/89Abnormal   128  --  --  --   12/27/19 0741  98 3 °F (36 8 °C)  69  18  146/65  93  97 %  None (Room air)  Lying   12/26/19 2237  98 6 °F (37 °C)  66  18  112/53  78  92 %  None (Room air)  Lying   12/26/19 2106  --  63  --  137/63  --  --  --  --   12/26/19 1434  98 4 °F (36 9 °C)  59  18  157/77  89  100 %  None (Room air)  Lying   12/26/19 1300  97 7 °F (36 5 °C)  76  16  154/80  --  92 %  None (Room air)  Lying       Pertinent Labs/Diagnostic Test Results:   12/26 CXR: Borderline cardiomegaly  Mild pulmonary vascular congestion  Left IJ catheter in place  No pneumothorax identified  12/27 permacath exchange:Impression: Successful exchange of tunneled dialysis catheter via the left internal jugular vein    No EKG  Results from last 7 days   Lab Units 12/26/19  1619   WBC Thousand/uL 6 76   HEMOGLOBIN g/dL 10 3*   HEMATOCRIT % 34 5*   PLATELETS Thousands/uL 146*  154         Results from last 7 days   Lab Units 12/27/19  0431 12/26/19  1031   SODIUM mmol/L 142 138   POTASSIUM mmol/L 4 8 4 1   CHLORIDE mmol/L 107 107   CO2 mmol/L 24 19*   ANION GAP mmol/L 11 12   BUN mg/dL 66* 57*   CREATININE mg/dL 7 59* 6 28*   EGFR ml/min/1 73sq m 5 7   CALCIUM mg/dL 8 7 8 0*             Results from last 7 days   Lab Units 12/27/19  0431 12/26/19  1031   GLUCOSE RANDOM mg/dL 96 169*     Results from last 7 days   Lab Units 12/26/19  1619   PROTIME seconds 14 6*   INR  1 20*     ED Treatment:   Medication Administration from 12/26/2019 0908 to 12/26/2019 1236     None        Past Medical History:   Diagnosis Date    Altered mental status, unspecified     Anemia     Atherosclerotic heart disease of native coronary artery without angina pectoris     Cancer (HCC)     Chest pain     Chronic diastolic (congestive) heart failure (HCC)     D[facility]     Diabetes mellitus ([facility]     End-stage renal disease ([facility] )     Gastro-esophageal reflux disease without esophagitis     Hyperlipidemia     Hypertension     Long term current use of anticoagulant     Long term current use of insulin (HCC)     Muscle weakness     Nausea with vomiting     Other abnormalities of gait and mobility     Other specified abnormal findings of blood chemistry     Type 2 diabetes mellitus ([facility]     Ventral hernia without obstruction or gangrene     Vitamin D deficiency      Present on Admission:   Essential hypertension   Cognitive impairment      Admitting Diagnosis: ESRD on hemodialysis ([facility] ) [N48 9, N25 5]  Complication of vascular dialysis catheter, unspecified complication, initial encounter [T82  9XXA]  Age/Sex: 81 y o  female  Admission Orders:  Scheduled Medications:    Medications:  amLODIPine 10 mg Oral Daily   aspirin 81 mg Oral Daily   atorvastatin 40 mg Oral QPM   busPIRone 5 mg Oral TID   docusate sodium 100 mg Oral BID   fentaNYL 1 patch Transdermal Q72H   guaiFENesin 600 mg Oral Q12H [address]   heparin (porcine) 5,000 Units Subcutaneous Q8H [address]   ketorolac 1 drop Both Eyes 4x Daily   labetalol 100 mg Oral BID   polyethylene glycol 17 g Oral Daily   sertraline 50 mg Oral Daily   sevelamer 800 mg Oral TID With Meals        PRN Meds:    acetaminophen 650 mg Oral Q6H PRN   bisacodyl 5 mg Oral Daily PRN   doxercalciferol 1 mcg Intravenous After Dialysis   iohexol 50 mL Other Once in imaging   nitroglycerin 0 4 mg Sublingual Q5 Min PRN   ondansetron 4 mg Intravenous Q4H PRN       IP CONSULT TO NEPHROLOGY    Network Utilization Review Department  [email or web address]  ATTENTION: Please call with any questions or concerns to [phone] and carefully listen to the prompts so that you are directed to the right person  All voicemails are confidential   Xuan Soriano all requests for admission clinical reviews, approved or denied determinations and any other requests to dedicated fax number below belonging to the campus where the patient is receiving treatment   List of dedicated fax numbers for the Facilities:  [address] DENIALS (Administrative/Medical Necessity) [phone]   [address] (Maternity/NICU/Pediatrics) [phone]   [facility] [phone]   [facility] [phone]   Pratik Him [phone]   [facility] [place] [address] [phone] [facility] [phone]   [address] [phone]

## 2019-12-27 NOTE — ASSESSMENT & PLAN NOTE
· Patient with end-stage renal disease on hemodialysis Tuesday, Thursday, and Saturday; she presented for her regular HD session yesterday at [facility] but PermCath was nonfunctioning and she was sent over to the hospital for evaluation  Case was already discussed with Interventional Radiology and Nephrology by the ER attending and unfortunately based on a full IR scheduled on December 26, patient had to get admitted for observation until her catheter could be exchanged today and she could get dialysis this afternoon    · Appreciate Nephrology follow-up

## 2019-12-27 NOTE — PLAN OF CARE
Problem: Prexisting or High Potential for Compromised Skin Integrity  Goal: Skin integrity is maintained or improved  Description  INTERVENTIONS:  - Identify patients at risk for skin breakdown  - Assess and monitor skin integrity  - Assess and monitor nutrition and hydration status  - Monitor labs   - Assess for incontinence   - Turn and reposition patient  - Assist with mobility/ambulation  - Relieve pressure over bony prominences  - Avoid friction and shearing  - Provide appropriate hygiene as needed including keeping skin clean and dry  - Evaluate need for skin moisturizer/barrier cream  - Collaborate with interdisciplinary team   - Patient/family teaching  - Consider wound care consult   Outcome: Progressing     Problem: Potential for Falls  Goal: Patient will remain free of falls  Description  INTERVENTIONS:  - Assess patient frequently for physical needs  -  Identify cognitive and physical deficits and behaviors that affect risk of falls    -  [place] fall precautions as indicated by assessment   - Educate patient/family on patient safety including physical limitations  - Instruct patient to call for assistance with activity based on assessment  - Modify environment to reduce risk of injury  - Consider OT/PT consult to assist with strengthening/mobility  Outcome: Progressing     Problem: PAIN - ADULT  Goal: Verbalizes/displays adequate comfort level or baseline comfort level  Description  Interventions:  - Encourage patient to monitor pain and request assistance  - Assess pain using appropriate pain scale  - Administer analgesics based on type and severity of pain and evaluate response  - Implement non-pharmacological measures as appropriate and evaluate response  - Consider cultural and social influences on pain and pain management  - Notify physician/advanced practitioner if interventions unsuccessful or patient reports new pain  Outcome: Progressing     Problem: INFECTION - ADULT  Goal: Absence or prevention of progression during hospitalization  Description  INTERVENTIONS:  - Assess and monitor for signs and symptoms of infection  - Monitor lab/diagnostic results  - Monitor all insertion sites, i e  indwelling lines, tubes, and drains  - Monitor endotracheal if appropriate and nasal secretions for changes in amount and color  - [place] appropriate cooling/warming therapies per order  - Administer medications as ordered  - Instruct and encourage patient and family to use good hand hygiene technique  - Identify and instruct in appropriate isolation precautions for identified infection/condition  Outcome: Progressing  Goal: Absence of fever/infection during neutropenic period  Description  INTERVENTIONS:  - Monitor WBC    Outcome: Progressing     Problem: SAFETY ADULT  Goal: Patient will remain free of falls  Description  INTERVENTIONS:  - Assess patient frequently for physical needs  -  Identify cognitive and physical deficits and behaviors that affect risk of falls    -  [place] fall precautions as indicated by assessment   - Educate patient/family on patient safety including physical limitations  - Instruct patient to call for assistance with activity based on assessment  - Modify environment to reduce risk of injury  - Consider OT/PT consult to assist with strengthening/mobility  Outcome: Progressing  Goal: Maintain or return to baseline ADL function  Description  INTERVENTIONS:  -  Assess patient's ability to carry out ADLs; assess patient's baseline for ADL function and identify physical deficits which impact ability to perform ADLs (bathing, care of mouth/teeth, toileting, grooming, dressing, etc )  - Assess/evaluate cause of self-care deficits   - Assess range of motion  - Assess patient's mobility; develop plan if impaired  - Assess patient's need for assistive devices and provide as appropriate  - Encourage maximum independence but intervene and supervise when necessary  - Involve family in performance of ADLs  - Assess for home care needs following discharge   - Consider OT consult to assist with ADL evaluation and planning for discharge  - Provide patient education as appropriate  Outcome: Progressing  Goal: Maintain or return mobility status to optimal level  Description  INTERVENTIONS:  - Assess patient's baseline mobility status (ambulation, transfers, stairs, etc )    - Identify cognitive and physical deficits and behaviors that affect mobility  - Identify mobility aids required to assist with transfers and/or ambulation (gait belt, sit-to-stand, lift, walker, cane, etc )  - [place] fall precautions as indicated by assessment  - Record patient progress and toleration of activity level on Mobility SBAR; progress patient to next Phase/Stage  - Instruct patient to call for assistance with activity based on assessment  - Consider rehabilitation consult to assist with strengthening/weightbearing, etc   Outcome: Progressing     Problem: DISCHARGE PLANNING  Goal: Discharge to home or other facility with appropriate resources  Description  INTERVENTIONS:  - Identify barriers to discharge w/patient and caregiver  - Arrange for needed discharge resources and transportation as appropriate  - Identify discharge learning needs (meds, wound care, etc )  - Arrange for interpretive services to assist at discharge as needed  - Refer to Case Management Department for coordinating discharge planning if the patient needs post-hospital services based on physician/advanced practitioner order or complex needs related to functional status, cognitive ability, or social support system  Outcome: Progressing     Problem: Knowledge Deficit  Goal: Patient/family/caregiver demonstrates understanding of disease process, treatment plan, medications, and discharge instructions  Description  Complete learning assessment and assess knowledge base    Interventions:  - Provide teaching at level of understanding  - Provide teaching via preferred learning methods  Outcome: Progressing

## 2019-12-27 NOTE — DISCHARGE SUMMARY
Discharge- [place] Ayushal 1935, 81 y o  female MRN: [id number]    Unit/Bed#: S -01 Encounter: [id number]    Primary Care Provider: Charisma Diaz MD   Date and time admitted to hospital: 12/26/2019  9:08 AM  * Problem with vascular access  Assessment & Plan  · permcath exchanged today in IR    ESRD on hemodialysis [facility])  Assessment & Plan  · Patient with end-stage renal disease on hemodialysis Tuesday, Thursday, and Saturday; she presented for her regular HD session yesterday at [facility] but PermCath was nonfunctioning and she was sent over to the hospital for evaluation  Case was already discussed with Interventional Radiology and Nephrology by the ER attending and unfortunately based on a full IR scheduled on December 26, patient had to get admitted for observation until her catheter could be exchanged today and she could get dialysis this afternoon  · Appreciate Nephrology follow-up    Essential hypertension  Assessment & Plan  · Continue home medication doses (norvasc 10 mg daily and labetalol 100 bid)    Cognitive impairment  Assessment & Plan  · Chronic cognitive impairment without signs of acute encephalopathy      · Apparently this is due to previous strokes according to patient's daughter's conversation with the ER  · Patient is wheelchair bound and resides at [facility]    Continuous opioid dependence ([facility] )  Assessment & Plan  · On fentanyl with associated bowel regimen    Discharging Physician / Practitioner: Tiffani Pineda PA-C  PCP: Charisma Diaz MD  Admission Date:   Admission Orders (From admission, onward)     Ordered        12/26/19 1102  Place in Observation (expected length of stay for this patient is less than two midnights)  Once                   Discharge Date: 12/27/19    Resolved Problems  Date Reviewed: 12/27/2019    None        Consultations During Hospital Stay:  · IR, Nephrology    Procedures Performed:   · PermCath exchanged    Significant Findings / Test Results:   · As above    Incidental Findings:   · Superficial Mass on left side of neck which patient reports is chronic     Test Results Pending at Discharge (will require follow up): · None     Outpatient Tests Requested:  · None    Complications:  None    Reason for Admission:  Malfunctioning dialysis catheter    Hospital Course:     Princess Huynh is a 81 y o  female patient with end-stage renal disease on dialysis who originally presented to the hospital on 12/26/2019 due to a  malfunctioning dialysis catheter  Patient was brought over to the hospital to have it exchanged but unfortunately IR had a full schedule and could not do it that day in the emergency room  As such, patient was admitted overnight for observation and went to IR this morning to have her PermCath exchange  She is having her dialysis session this afternoon since she missed her regular Thursday session and will be discharged back to her nursing home following dialysis  Please see above list of diagnoses and related plan for additional information  Condition at Discharge: stable     Discharge Day Visit / Exam:     Subjective:  Patient is a poor historian  She is noted to have some cough but no other new events  Vitals: Blood Pressure: 140/63 (12/27/19 1029)  Pulse: 78 (12/27/19 1029)  Temperature: 98 3 °F (36 8 °C) (12/27/19 0741)  Temp Source: Oral (12/27/19 0741)  Respirations: 16 (12/27/19 0930)  Weight - Scale: 94 2 kg (207 lb 10 8 oz) (12/26/19 0912)  SpO2: 98 % (12/27/19 1029)  Exam:   Physical Exam   Constitutional: She appears well-developed and well-nourished  No distress  HENT:   Unchanged left neck superficial mass   Eyes: Conjunctivae are normal  Right eye exhibits no discharge  Left eye exhibits no discharge  No scleral icterus  Cardiovascular: Normal rate and regular rhythm  No murmur heard  Pulmonary/Chest: No stridor  No respiratory distress  She has no wheezes     Cough noted, very poor effort noted on lung exam   No dyspnea or tachypnea   Abdominal: Soft  She exhibits no distension  There is no tenderness  There is no guarding  Musculoskeletal: She exhibits edema (Bilateral lower extremity mild edema)  Neurological: She is alert  Awake alert but very poor historian   Skin: Skin is warm and dry  No rash noted  She is not diaphoretic  No erythema  No pallor  Psychiatric:   Flat affect   Vitals reviewed  Discussion with Family:  Spoke with daughter over the phone    Discharge instructions/Information to patient and family:   See after visit summary for information provided to patient and family  Provisions for Follow-Up Care:  See after visit summary for information related to follow-up care and any pertinent home health orders  Disposition:  Return to [facility]    Planned Readmission: none     Discharge Statement:  I spent 30 minutes discharging the patient  This time was spent on the day of discharge  I had direct contact with the patient on the day of discharge  Greater than 50% of the total time was spent examining patient, answering all patient questions, arranging and discussing plan of care with patient as well as directly providing post-discharge instructions  Additional time then spent on discharge activities  Case discussed with case management,    Discharge Medications:  See after visit summary for reconciled discharge medications provided to patient and family        ** Please Note: This note has been constructed using a voice recognition system **

## 2019-12-27 NOTE — DISCHARGE INSTRUCTIONS
Perma-cath Placement   WHAT YOU NEED TO KNOW:   A perma-cath is a catheter placed through a vein into or near your right atrium  Your right atrium is the right upper chamber of your heart  A perma-cath is used for dialysis in an emergency or until a long-term device is ready to use  After your procedure, you will have some pain and swelling on your chest and neck  You may have some bruises on your chest and neck  You may also have 2 dressings, one on your chest and one on your neck  DISCHARGE INSTRUCTIONS:   Call 911 for any of the following:   · You feel lightheaded, short of breath, and have chest pain  · Your catheter comes out   Contact Interventional Radiology at [phone] [place] PATIENTS: Contact Interventional Radiology at [phone]) Shaniqua Bell PATIENTS: Contact Interventional Radiology at [phone]) if:  · Blood soaks through your bandage  · You have new swelling in your arm, neck, face, or chest on your right side  · Your catheter gets wet  · Your bruises or pain get worse  · You have a fever or chills  · Persistent nausea or vomiting  · Your incision is red, swollen, or draining pus  · You have questions or concerns about your condition or care  Self-care:       · Resume your normal diet  · Keep your dressings dry  Do not take a shower or swim  You may take a tub bath, but do not get your dressings wet  Water in your wound can cause bacteria to grow and cause an infection  If your dressing gets wet, dry it off and cover it with dry sterile gauze  Call your healthcare provider  Do not use soaps or ointments  · Do not change your dressings  Your healthcare provider or dialysis nurse will change your dressings  Your dressings should stay in place until your healthcare provider removes them  The dressing on your chest will stay as long as you have the catheter in place  The dressing prevents infection  · Do not remove the red and blue caps from the end of your catheter   The caps prevent air from getting into your catheter    Follow up with your healthcare provider as directed: Write down your questions so you remember to ask them during your visits

## 2019-12-27 NOTE — HEMODIALYSIS
Pt dialyzed for 2 5 hrs today after new permacath placement this am  Adequate flow from catheter with lines reversed  Drop in bp during tx, uf off and nss given for sys bp <90  545ml uf removed  Pre bed wt 84 5kg, post 84 8kg, +0 3kg  BP wnl post reinfusion

## 2019-12-27 NOTE — ASSESSMENT & PLAN NOTE
· Chronic cognitive impairment without signs of acute encephalopathy      · Apparently this is due to previous strokes according to patient's daughter's conversation with the ER  · Patient is wheelchair bound and resides at [facility]

## 2019-12-27 NOTE — TRANSPORTATION MEDICAL NECESSITY
Section I - General Information    Name of Patient: Socorro Chairez                 : 1935    Medicare #: [id number]  Transport Date: 19 (PCS is valid for round trips on this date and for all repetitive trips in the 60-day range as noted below )  Origin: [address]: [address]  Is the pt's stay covered under Medicare Part A (PPS/DRG)   []     Closest appropriate facility? If no, why is transport to more distant facility required? Yes  If hospice pt, is this transport related to pt's terminal illness? NA       Section II - Medical Necessity Questionnaire  Ambulance transportation is medically necessary only if other means of transport are contraindicated or would be potentially harmful to the patient  To meet this requirement, the patient must either be "bed confined" or suffer from a condition such that transport by means other than ambulance is contraindicated by the patient's condition  The following questions must be answered by the medical professional signing below for this form to be valid:    1)  Describe the MEDICAL CONDITION (physical and/or mental) of this patient AT [address] [place] [address] that requires the patient to be transported in an ambulance and why transport by other means is contraindicated by the patient's condition: Cognitive impairment; Acute encephalopathy; Ambulatory dysfunction; Continuous opioid dependence for chronic pain; Patient has intermittently needed O2 via NC which she is unable to manage on her own    2) Is the patient "bed confined" as defined below? Yes  To be "be confined" the patient must satisfy all three of the following conditions: (1) unable to get up from bed without Assistance; AND (2) unable to ambulate; AND (3) unable to sit in a chair or wheelchair      3) Can this patient safely be transported by car or wheelchair van (i e , seated during transport without a medical attendant or monitoring)? No    4) In addition to completing questions 1-3 above, please check any of the following conditions that apply*:   *Note: supporting documentation for any boxes checked must be maintained in the patient's medical records  If hosp-hosp transfer, describe services needed at 2nd facility not available at 1st facility? Patient is confused  Moderate/severe pain on movement   Medical attendant required   Requires oxygen-unable to self administer  Unable to sit in a chair or wheelchair due to decubitus ulcers or other wounds   Other(specify) Babak Lopez high fall risk      Section III - Signature of Physician or Healthcare Professional  I certify that the above information is true and correct based on my evaluation of this patient, and represent that the patient requires transport by ambulance and that other forms of transport are contraindicated  I understand that this information will be used by the Centers for Medicare and Medicaid Services (CMS) to support the determination of medical necessity for ambulance services, and I represent that I have personal knowledge of the patient's condition at time of transport  [x]  If this box is checked, I also certify that the patient is physically or mentally incapable of signing the ambulance service's claim and that the institution with which I am affiliated has furnished care, services, or assistance to the patient  My signature below is made on behalf of the patient pursuant to 42 CFR §424 36(b)(4)   In accordance with 42 CFR §424 37, the specific reason(s) that the patient is physically or mentally incapable of signing the claim form is as follows:     Signature of Physician* or Healthcare Professional______________________________________________________________  Signature Date 12/27/19 (For scheduled repetitive transports, this form is not valid for transports performed more than 60 days after this date)    Printed Name & Credentials of Physician or Healthcare Professional (MD, DO, RN, etc )________________________________  *Form must be signed by patient's attending physician for scheduled, repetitive transports   For non-repetitive, unscheduled ambulance transports, if unable to obtain the signature of the attending physician, any of the following may sign (choose appropriate option below)  [] Physician Assistant []  Clinical Nurse Specialist []  Registered Nurse  []  Nurse Practitioner  [x] Discharge Planner

## 2019-12-27 NOTE — BRIEF OP NOTE (RAD/CATH)
IR [address] EXCHANGE Procedure Note    PATIENT NAME: Talya Reed  : 1935  MRN: [id number]    Pre-op Diagnosis:   1  Complication of vascular dialysis catheter, unspecified complication, initial encounter    2  ESRD on hemodialysis (HCC)      Post-op Diagnosis:   1  Complication of vascular dialysis catheter, unspecified complication, initial encounter    2   ESRD on hemodialysis [facility])        Surgeon:   Cinthia Wilder DO  Assistants:     No qualified resident was available, Resident is only observing    Estimated Blood Loss: minimal    Findings: left tunneled dialysis catheter exchanged without incident    Specimens: none    Complications:  None apparent    Anesthesia: Conscious sedation and Local    Cinthia Wilder DO     Date: 2019  Time: 9:34 AM

## 2019-12-27 NOTE — PROGRESS NOTES
[address] [place] [address] 81 y o  female MRN: [id number]  Unit/Bed#: S -01 Encounter: [id number]  Reason for Consult:  ESRD    ASSESSMENT and PLAN:  81-year-old female with end-stage renal disease who presented with malfunction of PermCath  1  ESRD:    · Patient on hemodialysis TTS at [facility]  · Hemodialysis today-planned for this afternoon  2  Access:  New PermCath inserted 12/27/2019  3  Hypertension:  Blood pressure elevated pre treatment  Monitor with volume removal   Usual blood pressure [phone] systolic following treatment  Continue home regime which includes amlodipine and labetalol  4  Anemia:  Hemoglobin at goal   On Venofer weekly    DISPOSITION:  New PermCath placed- Hemodialysis treatment this afternoon    SUBJECTIVE / INTERVAL HISTORY:  No complaints at this time  Spoke with patient and daughter regarding plan    OBJECTIVE:  Current Weight: Weight - Scale: 94 2 kg (207 lb 10 8 oz)  Vitals:    12/27/19 0920 12/27/19 0924 12/27/19 0930 12/27/19 1029   BP: (!) 172/79 (!) 172/77 (!) 202/91 140/63   BP Location:    Right arm   Pulse: 73 72 84 78   Resp: 14 14 16    Temp:       TempSrc:       SpO2: 100% 100% 100% 98%   Weight:           Intake/Output Summary (Last 24 hours) at 12/27/2019 1115  Last data filed at 12/26/2019 1900  Gross per 24 hour   Intake 120 ml   Output --   Net 120 ml     General: NAD, quietly lying in bed  Skin: no rash  Eyes: anicteric sclera  ENT: moist mucous membrane  Neck: supple  Chest: CTA b/l, no ronchii, no wheeze, no rubs, no rales  CVS: J5Y0, systolic murmur, no gallop, no rub  Abdomen: soft, nontender, nl sounds  Extremities:  Bilateral lower extremity edema +1 to +2  : no ambrosio  Neuro:  Minimal verbal response but responses seem appropriate  Nonfocal exam  Psych:   Withdrawn  Medications:    Current Facility-Administered Medications:     acetaminophen (TYLENOL) tablet 650 mg, 650 mg, Oral, Q6H PRN, Christin Johnson PA-C, 650 mg at 12/26/19 1722    amLODIPine (NORVASC) tablet 10 mg, 10 mg, Oral, Daily, Christin Johnson PA-C, 10 mg at 12/27/19 1004    aspirin chewable tablet 81 mg, 81 mg, Oral, Daily, Christin Johnson PA-C, 81 mg at 12/27/19 1004    atorvastatin (LIPITOR) tablet 40 mg, 40 mg, Oral, QPM, Christin Johnson PA-C, 40 mg at 12/26/19 1723    bisacodyl (DULCOLAX) EC tablet 5 mg, 5 mg, Oral, Daily PRN, Christin Johnson PA-C    busPIRone (BUSPAR) tablet 5 mg, 5 mg, Oral, TID, Christin Jonhson PA-C, 5 mg at 12/27/19 1004    docusate sodium (COLACE) capsule 100 mg, 100 mg, Oral, BID, Christin Jhonson PA-C    doxercalciferol (HECTOROL) injection 1 mcg, 1 mcg, Intravenous, After Dialysis, Edie Camara MD    fentaNYL (DURAGESIC) 25 mcg/hr TD 72 hr patch 1 patch, 1 patch, Transdermal, Q72H, Christin Johnson PA-C, 1 patch at 12/26/19 1330    guaiFENesin (MUCINEX) 12 hr tablet 600 mg, 600 mg, Oral, Q12H [facility], Christin Johnson PA-C, 600 mg at 12/27/19 1003    heparin (porcine) subcutaneous injection 5,000 Units, 5,000 Units, Subcutaneous, Q8H [facility], 5,000 Units at 12/26/19 2116 **AND** [COMPLETED] Platelet count, , , Once, Christin Johnson PA-C    iohexol (OMNIPAQUE) 300 mg/mL injection 50 mL, 50 mL, Other, Once in imaging, Hawa Browne DO    ketorolac (ACULAR) 0 4 % ophthalmic solution 1 drop, 1 drop, Both Eyes, 4x Daily, Christin Johnson PA-C    labetalol (NORMODYNE) tablet 100 mg, 100 mg, Oral, BID, Christin Johnson PA-C, 100 mg at 12/27/19 1005    nitroglycerin (NITROSTAT) SL tablet 0 4 mg, 0 4 mg, Sublingual, Q5 Min PRN, Christin Johnson PA-C    ondansetron (ZOFRAN) injection 4 mg, 4 mg, Intravenous, Q4H PRN, Christin Johnson PA-C    polyethylene glycol (MIRALAX) packet 17 g, 17 g, Oral, Daily, Christin Johnson PA-C    sertraline (ZOLOFT) tablet 50 mg, 50 mg, Oral, Daily, Christin Johnson PA-C, 50 mg at 12/27/19 1004    sevelamer (RENAGEL) tablet 800 mg, 800 mg, Oral, TID With Meals, Marvin NALINI Johnson, Stopped at 12/27/19 0730    Laboratory Results:  Results from last 7 days   Lab Units 12/27/19  0431 12/26/19  1619 12/26/19  1031   WBC Thousand/uL  --  6 76  --    HEMOGLOBIN g/dL  --  10 3*  --    HEMATOCRIT %  --  34 5*  --    PLATELETS Thousands/uL  --  146*  154  --    POTASSIUM mmol/L 4 8  --  4 1   CHLORIDE mmol/L 107  --  107   CO2 mmol/L 24  --  19*   BUN mg/dL 66*  --  57*   CREATININE mg/dL 7 59*  --  6 28*   CALCIUM mg/dL 8 7  --  8 0*

## 2019-12-28 ENCOUNTER — APPOINTMENT (EMERGENCY)
Dept: RADIOLOGY | Facility: HOSPITAL | Age: 84
DRG: 193 | End: 2019-12-28
Payer: MEDICARE

## 2019-12-28 ENCOUNTER — APPOINTMENT (EMERGENCY)
Dept: CT IMAGING | Facility: HOSPITAL | Age: 84
DRG: 193 | End: 2019-12-28
Payer: MEDICARE

## 2019-12-28 ENCOUNTER — HOSPITAL ENCOUNTER (EMERGENCY)
Facility: HOSPITAL | Age: 84
Discharge: NURSING, REHAB OR LONG-TERM CARE | DRG: 193 | End: 2019-12-28
Attending: EMERGENCY MEDICINE | Admitting: EMERGENCY MEDICINE
Payer: MEDICARE

## 2019-12-28 ENCOUNTER — TELEPHONE (OUTPATIENT)
Dept: OTHER | Facility: OTHER | Age: 84
End: 2019-12-28

## 2019-12-28 VITALS
RESPIRATION RATE: 16 BRPM | TEMPERATURE: 98.5 F | HEART RATE: 91 BPM | SYSTOLIC BLOOD PRESSURE: 166 MMHG | DIASTOLIC BLOOD PRESSURE: 70 MMHG | OXYGEN SATURATION: 94 %

## 2019-12-28 DIAGNOSIS — R09.89 CHEST CONGESTION: Primary | ICD-10-CM

## 2019-12-28 LAB
ANION GAP SERPL CALCULATED.3IONS-SCNC: 13 MMOL/L (ref 4–13)
BASOPHILS # BLD AUTO: 0.04 THOUSANDS/ΜL (ref 0–0.1)
BASOPHILS NFR BLD AUTO: 1 % (ref 0–1)
BUN SERPL-MCNC: 49 MG/DL (ref 5–25)
CALCIUM SERPL-MCNC: 8.2 MG/DL (ref 8.3–10.1)
CHLORIDE SERPL-SCNC: 101 MMOL/L (ref 100–108)
CO2 SERPL-SCNC: 24 MMOL/L (ref 21–32)
CREAT SERPL-MCNC: 5.88 MG/DL (ref 0.6–1.3)
EOSINOPHIL # BLD AUTO: 0.04 THOUSAND/ΜL (ref 0–0.61)
EOSINOPHIL NFR BLD AUTO: 1 % (ref 0–6)
ERYTHROCYTE [DISTWIDTH] IN BLOOD BY AUTOMATED COUNT: 16.2 % (ref 11.6–15.1)
GFR SERPL CREATININE-BSD FRML MDRD: 7 ML/MIN/1.73SQ M
GLUCOSE SERPL-MCNC: 149 MG/DL (ref 65–140)
HCT VFR BLD AUTO: 34.2 % (ref 34.8–46.1)
HGB BLD-MCNC: 10.2 G/DL (ref 11.5–15.4)
IMM GRANULOCYTES # BLD AUTO: 0.02 THOUSAND/UL (ref 0–0.2)
IMM GRANULOCYTES NFR BLD AUTO: 0 % (ref 0–2)
LYMPHOCYTES # BLD AUTO: 0.6 THOUSANDS/ΜL (ref 0.6–4.47)
LYMPHOCYTES NFR BLD AUTO: 8 % (ref 14–44)
MCH RBC QN AUTO: 27.4 PG (ref 26.8–34.3)
MCHC RBC AUTO-ENTMCNC: 29.8 G/DL (ref 31.4–37.4)
MCV RBC AUTO: 92 FL (ref 82–98)
MONOCYTES # BLD AUTO: 0.96 THOUSAND/ΜL (ref 0.17–1.22)
MONOCYTES NFR BLD AUTO: 13 % (ref 4–12)
NEUTROPHILS # BLD AUTO: 5.7 THOUSANDS/ΜL (ref 1.85–7.62)
NEUTS SEG NFR BLD AUTO: 77 % (ref 43–75)
NRBC BLD AUTO-RTO: 0 /100 WBCS
PLATELET # BLD AUTO: 137 THOUSANDS/UL (ref 149–390)
PMV BLD AUTO: 10.9 FL (ref 8.9–12.7)
POTASSIUM SERPL-SCNC: 4.1 MMOL/L (ref 3.5–5.3)
RBC # BLD AUTO: 3.72 MILLION/UL (ref 3.81–5.12)
SODIUM SERPL-SCNC: 138 MMOL/L (ref 136–145)
WBC # BLD AUTO: 7.36 THOUSAND/UL (ref 4.31–10.16)

## 2019-12-28 PROCEDURE — 99284 EMERGENCY DEPT VISIT MOD MDM: CPT | Performed by: EMERGENCY MEDICINE

## 2019-12-28 PROCEDURE — 80048 BASIC METABOLIC PNL TOTAL CA: CPT | Performed by: EMERGENCY MEDICINE

## 2019-12-28 PROCEDURE — 70450 CT HEAD/BRAIN W/O DYE: CPT

## 2019-12-28 PROCEDURE — 85025 COMPLETE CBC W/AUTO DIFF WBC: CPT | Performed by: EMERGENCY MEDICINE

## 2019-12-28 PROCEDURE — 36415 COLL VENOUS BLD VENIPUNCTURE: CPT | Performed by: EMERGENCY MEDICINE

## 2019-12-28 PROCEDURE — 71045 X-RAY EXAM CHEST 1 VIEW: CPT

## 2019-12-28 PROCEDURE — 99285 EMERGENCY DEPT VISIT HI MDM: CPT

## 2019-12-28 RX ORDER — IPRATROPIUM BROMIDE AND ALBUTEROL SULFATE .5; 3 MG/3ML; MG/3ML
1 SOLUTION RESPIRATORY (INHALATION) ONCE
Status: COMPLETED | OUTPATIENT
Start: 2019-12-28 | End: 2019-12-28

## 2019-12-28 RX ORDER — ALBUTEROL SULFATE 2.5 MG/3ML
1 SOLUTION RESPIRATORY (INHALATION) ONCE
Status: COMPLETED | OUTPATIENT
Start: 2019-12-28 | End: 2019-12-28

## 2019-12-28 NOTE — ED NOTES
Spoke with Eric Salcido from [facility]  Awaiting pickup time        Cayetano Armstrong, ARSALAN  12/28/19 0915

## 2019-12-28 NOTE — ED PROVIDER NOTES
History  Chief Complaint   Patient presents with    Altered Mental Status     pt comes from SNF  EMS called for "low pulse ox" EMS noted facial droop  pt complains of "everyrthing"       History provided by:  Patient   used: No      Patient brought from local skilled nursing facility  She was brought for low pulse ox and   EMS notes facial droop  Unclear if new or old initially  Blood sugar normal for EMS  Patient has pain everywhere which appears to be chronic  She did miss her dialysis this morning  She denies any shortness of breath  No aggravating or alleviating symptoms unknown baseline initially  Called patient's daughter  Patient's daughter states that patient has had history of mini strokes  She states she has intermittent difficulty with facial droop  States that she was like this last evening  She was mostly concerned about reported hypoxia  Prior to Admission Medications   Prescriptions Last Dose Informant Patient Reported?  Taking?   acetaminophen (TYLENOL) 325 mg tablet  Outside Facility (Specify) Yes No   Sig: Take 650 mg by mouth every 6 (six) hours as needed for mild pain   albuterol (2 5 mg/3 mL) 0 083 % nebulizer solution  Outside Facility (Specify) Yes No   Sig: Take 2 5 mg by nebulization every 6 (six) hours as needed for wheezing   albuterol (PROVENTIL HFA,VENTOLIN HFA) 90 mcg/act inhaler  Outside Facility (Specify) Yes No   Sig: Inhale 2 puffs every 6 (six) hours as needed for wheezing   amLODIPine (NORVASC) 10 mg tablet   No No   Sig: Take 1 tablet (10 mg total) by mouth daily Home med   aspirin 81 mg chewable tablet   No No   Sig: Chew 1 tablet (81 mg total) daily   atorvastatin (LIPITOR) 40 mg tablet   No No   Sig: Take 1 tablet (40 mg total) by mouth every evening   bisacodyl (DULCOLAX) 5 mg EC tablet  Outside Facility (Specify) Yes No   Sig: Take 5 mg by mouth daily as needed for constipation   busPIRone (BUSPAR) 5 mg tablet  Outside Facility (Specify) Yes No   Sig: Take 5 mg by mouth 3 (three) times a day   docusate sodium (COLACE) 100 mg capsule  Outside Facility (Specify) Yes No   Sig: Take 100 mg by mouth 2 (two) times a day   fentaNYL (DURAGESIC) 25 mcg/hr  Outside Facility (Specify) Yes No   Sig: Place 1 patch on the skin every third day   guaiFENesin (MUCINEX) 600 mg 12 hr tablet   No No   Sig: Take 1 tablet (600 mg total) by mouth every 12 (twelve) hours   ketorolac (ACULAR) 0 4 % SOLN  Outside Facility (Specify) Yes No   Si drop 4 (four) times a day   labetalol (NORMODYNE) 100 mg tablet  Outside Facility (Specify) Yes No   Sig: Take 100 mg by mouth 2 (two) times a day   nitroglycerin (NITROSTAT) 0 4 mg SL tablet  Outside Facility (Specify) Yes No   Sig: Place 0 4 mg under the tongue every 5 (five) minutes as needed for chest pain   polyethylene glycol (MIRALAX) 17 g packet  Outside Facility (Specify) Yes No   Sig: Take 17 g by mouth daily   sertraline (ZOLOFT) 25 mg tablet  Outside Facility (Specify) Yes No   Sig: Take 50 mg by mouth daily   sevelamer (RENAGEL) 800 mg tablet   No No   Sig: Take 1 tablet (800 mg total) by mouth 3 (three) times a day with meals Home med      Facility-Administered Medications: None       Past Medical History:   Diagnosis Date    Altered mental status, unspecified     Anemia     Atherosclerotic heart disease of native coronary artery without angina pectoris     Cancer (HCC)     Chest pain     Chronic diastolic (congestive) heart failure (HCC)     Dependence on renal dialysis (HCC)     Diabetes mellitus (HCC)     End-stage renal disease (HCC)     Gastro-esophageal reflux disease without esophagitis     Hyperlipidemia     Hypertension     Long term current use of anticoagulant     Long term current use of insulin (HCC)     Muscle weakness     Nausea with vomiting     Other abnormalities of gait and mobility     Other specified abnormal findings of blood chemistry     Type 2 diabetes mellitus ([facility] [address] )     Ventral hernia without obstruction or gangrene     Vitamin D deficiency        Past Surgical History:   Procedure Laterality Date    AV FISTULA PLACEMENT      IR [address] EXCHANGE  12/27/2019    MASTECTOMY      R arm       History reviewed  No pertinent family history  I have reviewed and agree with the history as documented  Social History     Tobacco Use    Smoking status: Never Smoker    Smokeless tobacco: Never Used   Substance Use Topics    Alcohol use: No    Drug use: No        Review of Systems   Constitutional: Positive for activity change and appetite change  HENT: Positive for congestion  Eyes: Negative for discharge and itching  Respiratory: Positive for cough and shortness of breath  Negative for wheezing  Cardiovascular: Negative for chest pain and leg swelling  Gastrointestinal: Negative for abdominal distention  Genitourinary: Negative for difficulty urinating, dyspareunia and dysuria  Musculoskeletal: Negative for arthralgias and back pain  Neurological: Negative for dizziness and facial asymmetry  All other systems reviewed and are negative  Physical Exam  Physical Exam   Constitutional: She is oriented to person, place, and time  No distress  Chronically ill-appearing   HENT:   Head: Normocephalic and atraumatic  Eyes: Right eye exhibits normal extraocular motion  Left eye exhibits normal extraocular motion  Neck: No tracheal deviation present  Unable to assess   Cardiovascular: Normal rate and regular rhythm  Pulmonary/Chest: Effort normal  No respiratory distress  No focal abnormal lung sounds   Abdominal: Soft  She exhibits no distension  There is no guarding  Musculoskeletal: She exhibits no edema or tenderness  Limited, chronic   Neurological: She is alert and oriented to person, place, and time  Slight right facial droop  Speaking full sentences  No obvious focal weakness, numbness, tingling of her extremities     Skin: Capillary refill takes less than 2 seconds  Psychiatric: She has a normal mood and affect  Her behavior is normal  Her mood appears not anxious  She is not agitated  Nursing note and vitals reviewed        Vital Signs  ED Triage Vitals   Temperature Pulse Respirations Blood Pressure SpO2   12/28/19 0727 12/28/19 0725 12/28/19 0725 12/28/19 0725 12/28/19 0725   98 5 °F (36 9 °C) 85 16 161/73 96 %      Temp Source Heart Rate Source Patient Position - Orthostatic VS BP Location FiO2 (%)   12/28/19 0727 12/28/19 0725 12/28/19 0725 12/28/19 0725 --   Oral Monitor Lying Right arm       Pain Score       12/28/19 0954       No Pain           Vitals:    12/28/19 0725 12/28/19 0954   BP: 161/73 156/67   Pulse: 85 88   Patient Position - Orthostatic VS: Lying Lying         Visual Acuity      ED Medications  Medications   ipratropium-albuterol (FOR EMS ONLY) (DUO-NEB) 0 5-2 5 mg/3 mL inhalation solution 3 mL (0 mL Does not apply Given to EMS 12/28/19 0738)   albuterol (FOR EMS ONLY) (2 5 mg/3 mL) 0 083 % inhalation solution 2 5 mg (0 mg Does not apply Given to EMS 12/28/19 0738)       Diagnostic Studies  Results Reviewed     Procedure Component Value Units Date/Time    Basic metabolic panel [[id number]]  (Abnormal) Collected:  12/28/19 0839    Lab Status:  Final result Specimen:  Blood from Arm, Right Updated:  12/28/19 0906     Sodium 138 mmol/L      Potassium 4 1 mmol/L      Chloride 101 mmol/L      CO2 24 mmol/L      ANION GAP 13 mmol/L      BUN 49 mg/dL      Creatinine 5 88 mg/dL      Glucose 149 mg/dL      Calcium 8 2 mg/dL      eGFR 7 ml/min/1 73sq m     Narrative:       Joe guidelines for Chronic Kidney Disease (CKD):     Stage 1 with normal or high GFR (GFR > 90 mL/min/1 73 square meters)    Stage 2 Mild CKD (GFR = 60-89 mL/min/1 73 square meters)    Stage 3A Moderate CKD (GFR = 45-59 mL/min/1 73 square meters)    Stage 3B Moderate CKD (GFR = 30-44 mL/min/1 73 square meters)   Stage 4 Severe CKD (GFR = 15-29 mL/min/1 73 square meters)    Stage 5 End Stage CKD (GFR <15 mL/min/1 73 square meters)  Note: GFR calculation is accurate only with a steady state creatinine    CBC and differential [[id number]]  (Abnormal) Collected:  12/28/19 0839    Lab Status:  Final result Specimen:  Blood from Arm, Right Updated:  12/28/19 0901     WBC 7 36 Thousand/uL      RBC 3 72 Million/uL      Hemoglobin 10 2 g/dL      Hematocrit 34 2 %      MCV 92 fL      MCH 27 4 pg      MCHC 29 8 g/dL      RDW 16 2 %      MPV 10 9 fL      Platelets 130 Thousands/uL      nRBC 0 /100 WBCs      Neutrophils Relative 77 %      Immat GRANS % 0 %      Lymphocytes Relative 8 %      Monocytes Relative 13 %      Eosinophils Relative 1 %      Basophils Relative 1 %      Neutrophils Absolute 5 70 Thousands/µL      Immature Grans Absolute 0 02 Thousand/uL      Lymphocytes Absolute 0 60 Thousands/µL      Monocytes Absolute 0 96 Thousand/µL      Eosinophils Absolute 0 04 Thousand/µL      Basophils Absolute 0 04 Thousands/µL                  CT head without contrast   Final Result by Karen Randall DO (12/28 9744)   Stable cerebral atrophy with chronic small vessel ischemic white matter disease and chronic infarction  No acute intracranial abnormality  If there is continued concern for acute ischemic event, a follow-up MRI of the brain with diffusion-weighted imaging should be obtained  Workstation performed: [id number]         XR chest 1 view portable   ED Interpretation by Rocky Traore MD (12/28 1014)   NAD  Procedures  Procedures         ED Course  ED Course as of Dec 28 1027   Sat Dec 28, 2019   1009 Spoke with patient's daughter at bedside  She reports patient appears to be in her normal state of health  No significant hypoxia in ER  She is stable for discharge home    Discussed need for rescheduling of her scheduled dialysis later today or tomorrow MDM  Number of Diagnoses or Management Options  Chest congestion: new and requires workup  Diagnosis management comments: Patient arrives with a DNR/DNI/Do not hospitalize form  She was brought in mainly for chief complaint of hypoxia at her nursing home  She was brought in at the request of her daughter  Reportedly 80% on room air at the nursing home  EMS found patient with normal oxygen saturation  She is status post DuoNeb  She has no fever  She has no concerns  She does have a right facial droop  Patient's daughter reports patient has a history of "small strokes" in the past   Suspect this to be chronic  No stroke alert indicated  CT head with no signs of acute pathology  Vital signs unremarkable  Patient saturating 95-97% on room air throughout the entirety of her ER evaluation  Chest x-ray with no signs of fluid overload or pneumonia  Basic laboratory studies with no need for emergent dialysis  She was observed in the ER  She is at her baseline per daughter  She is stable for discharge back to facility  She needs to continue her dialysis and this was communicated with patient's daughter  Upon re-evaluation, patient voices no concerns  Normal vital signs  She is stable for discharge back to facility          Amount and/or Complexity of Data Reviewed  Clinical lab tests: ordered and reviewed  Tests in the radiology section of CPT®: ordered and reviewed  Obtain history from someone other than the patient: yes  Discuss the patient with other providers: yes  Independent visualization of images, tracings, or specimens: yes    Risk of Complications, Morbidity, and/or Mortality  Presenting problems: high  Diagnostic procedures: moderate  Management options: high    Patient Progress  Patient progress: improved        Disposition  Final diagnoses:   Chest congestion     Time reflects when diagnosis was documented in both MDM as applicable and the Disposition within this note     Time User Action Codes Description Comment    12/28/2019 10:08 AM Selvin Confer Add [N42 1] Congestion and hemorrhage of prostate     12/28/2019 10:13 AM Selvin Confer Remove [N42 1] Congestion and hemorrhage of prostate     12/28/2019 10:13 AM Selvin Confer Add [R09 89] Chest congestion       ED Disposition     ED Disposition Condition Date/Time Comment    Discharge Stable Sat Dec 28, 2019 10:08 AM Enid Snow discharge to home/self care  Follow-up Information     Follow up With Specialties Details Why Contact Info Additional Information    Aliyah Arguelles MD Family Medicine, Geriatric Medicine Schedule an appointment as soon as possible for a visit  As needed [address]       [address] Emergency Department Emergency Medicine Go to  If symptoms worsen [address]  [facility], [address], [place], [place], [address]          Patient's Medications   Discharge Prescriptions    No medications on file     No discharge procedures on file      ED Provider  Electronically Signed by           Tha Arreola MD  12/28/19 5130

## 2019-12-28 NOTE — TELEPHONE ENCOUNTER
Chad Carmichael from [place]/ [phone]/ PT: Jose Luis William : [address] / [address] Text PT'S information to [email or web address] advised caller to call back in 20-30 minutes if the Dr Clint Alcala contact her back

## 2019-12-29 ENCOUNTER — APPOINTMENT (INPATIENT)
Dept: DIALYSIS | Facility: HOSPITAL | Age: 84
DRG: 193 | End: 2019-12-29
Payer: MEDICARE

## 2019-12-29 ENCOUNTER — HOSPITAL ENCOUNTER (INPATIENT)
Facility: HOSPITAL | Age: 84
LOS: 10 days | Discharge: NURSING, REHAB OR LONG-TERM CARE | DRG: 193 | End: 2020-01-08
Attending: EMERGENCY MEDICINE | Admitting: INTERNAL MEDICINE
Payer: MEDICARE

## 2019-12-29 ENCOUNTER — APPOINTMENT (EMERGENCY)
Dept: RADIOLOGY | Facility: HOSPITAL | Age: 84
DRG: 193 | End: 2019-12-29
Payer: MEDICARE

## 2019-12-29 DIAGNOSIS — R94.31 QT PROLONGATION: ICD-10-CM

## 2019-12-29 DIAGNOSIS — N18.6 END STAGE RENAL DISEASE ON DIALYSIS (HCC): ICD-10-CM

## 2019-12-29 DIAGNOSIS — N18.6 ESRD ON HEMODIALYSIS (HCC): Chronic | ICD-10-CM

## 2019-12-29 DIAGNOSIS — I21.4 NON-STEMI (NON-ST ELEVATED MYOCARDIAL INFARCTION) (HCC): ICD-10-CM

## 2019-12-29 DIAGNOSIS — R78.81 GRAM-POSITIVE COCCI BACTEREMIA: ICD-10-CM

## 2019-12-29 DIAGNOSIS — R06.00 DYSPNEA: Primary | ICD-10-CM

## 2019-12-29 DIAGNOSIS — Z99.2 ESRD ON HEMODIALYSIS (HCC): Chronic | ICD-10-CM

## 2019-12-29 DIAGNOSIS — J10.1 INFLUENZA A: ICD-10-CM

## 2019-12-29 DIAGNOSIS — G89.29 OTHER CHRONIC PAIN: ICD-10-CM

## 2019-12-29 DIAGNOSIS — Z99.2 END STAGE RENAL DISEASE ON DIALYSIS (HCC): ICD-10-CM

## 2019-12-29 DIAGNOSIS — I10 HYPERTENSION: ICD-10-CM

## 2019-12-29 DIAGNOSIS — I10 ESSENTIAL HYPERTENSION: Chronic | ICD-10-CM

## 2019-12-29 PROBLEM — R77.8 ELEVATED TROPONIN: Status: ACTIVE | Noted: 2019-12-29

## 2019-12-29 LAB
ALBUMIN SERPL BCP-MCNC: 3.5 G/DL (ref 3.5–5)
ALP SERPL-CCNC: 103 U/L (ref 46–116)
ALT SERPL W P-5'-P-CCNC: 57 U/L (ref 12–78)
ANION GAP SERPL CALCULATED.3IONS-SCNC: 16 MMOL/L (ref 4–13)
APTT PPP: 33 SECONDS (ref 23–37)
APTT PPP: 55 SECONDS (ref 23–37)
AST SERPL W P-5'-P-CCNC: 58 U/L (ref 5–45)
ATRIAL RATE: 71 BPM
BASOPHILS # BLD AUTO: 0.03 THOUSANDS/ΜL (ref 0–0.1)
BASOPHILS NFR BLD AUTO: 0 % (ref 0–1)
BILIRUB SERPL-MCNC: 0.73 MG/DL (ref 0.2–1)
BUN SERPL-MCNC: 68 MG/DL (ref 5–25)
CALCIUM SERPL-MCNC: 8.3 MG/DL (ref 8.3–10.1)
CHLORIDE SERPL-SCNC: 101 MMOL/L (ref 100–108)
CO2 SERPL-SCNC: 22 MMOL/L (ref 21–32)
CREAT SERPL-MCNC: 7.25 MG/DL (ref 0.6–1.3)
EOSINOPHIL # BLD AUTO: 0.02 THOUSAND/ΜL (ref 0–0.61)
EOSINOPHIL NFR BLD AUTO: 0 % (ref 0–6)
ERYTHROCYTE [DISTWIDTH] IN BLOOD BY AUTOMATED COUNT: 16.2 % (ref 11.6–15.1)
FLUAV RNA NPH QL NAA+PROBE: DETECTED
FLUBV RNA NPH QL NAA+PROBE: ABNORMAL
GFR SERPL CREATININE-BSD FRML MDRD: 5 ML/MIN/1.73SQ M
GLUCOSE SERPL-MCNC: 141 MG/DL (ref 65–140)
HCT VFR BLD AUTO: 37.8 % (ref 34.8–46.1)
HGB BLD-MCNC: 11.3 G/DL (ref 11.5–15.4)
IMM GRANULOCYTES # BLD AUTO: 0.04 THOUSAND/UL (ref 0–0.2)
IMM GRANULOCYTES NFR BLD AUTO: 0 % (ref 0–2)
INR PPP: 1.2 (ref 0.84–1.19)
LACTATE SERPL-SCNC: 0.9 MMOL/L (ref 0.5–2)
LYMPHOCYTES # BLD AUTO: 0.47 THOUSANDS/ΜL (ref 0.6–4.47)
LYMPHOCYTES NFR BLD AUTO: 5 % (ref 14–44)
MAGNESIUM SERPL-MCNC: 2.5 MG/DL (ref 1.6–2.6)
MCH RBC QN AUTO: 27.6 PG (ref 26.8–34.3)
MCHC RBC AUTO-ENTMCNC: 29.9 G/DL (ref 31.4–37.4)
MCV RBC AUTO: 92 FL (ref 82–98)
MONOCYTES # BLD AUTO: 0.61 THOUSAND/ΜL (ref 0.17–1.22)
MONOCYTES NFR BLD AUTO: 6 % (ref 4–12)
NEUTROPHILS # BLD AUTO: 8.48 THOUSANDS/ΜL (ref 1.85–7.62)
NEUTS SEG NFR BLD AUTO: 89 % (ref 43–75)
NRBC BLD AUTO-RTO: 0 /100 WBCS
P AXIS: 72 DEGREES
PLATELET # BLD AUTO: 147 THOUSANDS/UL (ref 149–390)
PMV BLD AUTO: 11 FL (ref 8.9–12.7)
POTASSIUM SERPL-SCNC: 4.6 MMOL/L (ref 3.5–5.3)
PR INTERVAL: 162 MS
PROT SERPL-MCNC: 7.7 G/DL (ref 6.4–8.2)
PROTHROMBIN TIME: 14.6 SECONDS (ref 11.6–14.5)
QRS AXIS: -36 DEGREES
QRSD INTERVAL: 90 MS
QT INTERVAL: 490 MS
QTC INTERVAL: 532 MS
RBC # BLD AUTO: 4.09 MILLION/UL (ref 3.81–5.12)
RSV RNA NPH QL NAA+PROBE: ABNORMAL
SODIUM SERPL-SCNC: 139 MMOL/L (ref 136–145)
T WAVE AXIS: 25 DEGREES
TROPONIN I SERPL-MCNC: 1.59 NG/ML
TROPONIN I SERPL-MCNC: 1.7 NG/ML
TROPONIN I SERPL-MCNC: 2.19 NG/ML
VENTRICULAR RATE: 71 BPM
WBC # BLD AUTO: 9.65 THOUSAND/UL (ref 4.31–10.16)

## 2019-12-29 PROCEDURE — 93005 ELECTROCARDIOGRAM TRACING: CPT

## 2019-12-29 PROCEDURE — 96375 TX/PRO/DX INJ NEW DRUG ADDON: CPT

## 2019-12-29 PROCEDURE — 87081 CULTURE SCREEN ONLY: CPT | Performed by: INTERNAL MEDICINE

## 2019-12-29 PROCEDURE — 87147 CULTURE TYPE IMMUNOLOGIC: CPT | Performed by: PHYSICIAN ASSISTANT

## 2019-12-29 PROCEDURE — 87077 CULTURE AEROBIC IDENTIFY: CPT | Performed by: PHYSICIAN ASSISTANT

## 2019-12-29 PROCEDURE — 99284 EMERGENCY DEPT VISIT MOD MDM: CPT | Performed by: PHYSICIAN ASSISTANT

## 2019-12-29 PROCEDURE — 83605 ASSAY OF LACTIC ACID: CPT | Performed by: PHYSICIAN ASSISTANT

## 2019-12-29 PROCEDURE — 87040 BLOOD CULTURE FOR BACTERIA: CPT | Performed by: PHYSICIAN ASSISTANT

## 2019-12-29 PROCEDURE — 84484 ASSAY OF TROPONIN QUANT: CPT | Performed by: INTERNAL MEDICINE

## 2019-12-29 PROCEDURE — 36415 COLL VENOUS BLD VENIPUNCTURE: CPT | Performed by: PHYSICIAN ASSISTANT

## 2019-12-29 PROCEDURE — 85025 COMPLETE CBC W/AUTO DIFF WBC: CPT | Performed by: PHYSICIAN ASSISTANT

## 2019-12-29 PROCEDURE — 99223 1ST HOSP IP/OBS HIGH 75: CPT | Performed by: INTERNAL MEDICINE

## 2019-12-29 PROCEDURE — 85610 PROTHROMBIN TIME: CPT | Performed by: PHYSICIAN ASSISTANT

## 2019-12-29 PROCEDURE — 5A1D70Z PERFORMANCE OF URINARY FILTRATION, INTERMITTENT, LESS THAN 6 HOURS PER DAY: ICD-10-PCS | Performed by: INTERNAL MEDICINE

## 2019-12-29 PROCEDURE — 71046 X-RAY EXAM CHEST 2 VIEWS: CPT

## 2019-12-29 PROCEDURE — 96365 THER/PROPH/DIAG IV INF INIT: CPT

## 2019-12-29 PROCEDURE — 87631 RESP VIRUS 3-5 TARGETS: CPT | Performed by: PHYSICIAN ASSISTANT

## 2019-12-29 PROCEDURE — 99285 EMERGENCY DEPT VISIT HI MDM: CPT

## 2019-12-29 PROCEDURE — 84484 ASSAY OF TROPONIN QUANT: CPT | Performed by: PHYSICIAN ASSISTANT

## 2019-12-29 PROCEDURE — 80053 COMPREHEN METABOLIC PANEL: CPT | Performed by: PHYSICIAN ASSISTANT

## 2019-12-29 PROCEDURE — 87186 SC STD MICRODIL/AGAR DIL: CPT | Performed by: PHYSICIAN ASSISTANT

## 2019-12-29 PROCEDURE — 85730 THROMBOPLASTIN TIME PARTIAL: CPT | Performed by: PHYSICIAN ASSISTANT

## 2019-12-29 PROCEDURE — 93010 ELECTROCARDIOGRAM REPORT: CPT | Performed by: INTERNAL MEDICINE

## 2019-12-29 PROCEDURE — 83735 ASSAY OF MAGNESIUM: CPT | Performed by: PHYSICIAN ASSISTANT

## 2019-12-29 RX ORDER — BUSPIRONE HYDROCHLORIDE 5 MG/1
5 TABLET ORAL 3 TIMES DAILY
Status: DISCONTINUED | OUTPATIENT
Start: 2019-12-29 | End: 2020-01-08 | Stop reason: HOSPADM

## 2019-12-29 RX ORDER — HEPARIN SODIUM 1000 [USP'U]/ML
4000 INJECTION, SOLUTION INTRAVENOUS; SUBCUTANEOUS AS NEEDED
Status: DISCONTINUED | OUTPATIENT
Start: 2019-12-29 | End: 2019-12-31

## 2019-12-29 RX ORDER — ONDANSETRON 2 MG/ML
4 INJECTION INTRAMUSCULAR; INTRAVENOUS EVERY 6 HOURS PRN
Status: DISCONTINUED | OUTPATIENT
Start: 2019-12-29 | End: 2019-12-29

## 2019-12-29 RX ORDER — HEPARIN SODIUM 10000 [USP'U]/100ML
3-20 INJECTION, SOLUTION INTRAVENOUS
Status: DISCONTINUED | OUTPATIENT
Start: 2019-12-29 | End: 2019-12-31

## 2019-12-29 RX ORDER — ACETAMINOPHEN 325 MG/1
650 TABLET ORAL EVERY 6 HOURS PRN
Status: DISCONTINUED | OUTPATIENT
Start: 2019-12-29 | End: 2020-01-08 | Stop reason: HOSPADM

## 2019-12-29 RX ORDER — HEPARIN SODIUM 5000 [USP'U]/ML
5000 INJECTION, SOLUTION INTRAVENOUS; SUBCUTANEOUS EVERY 8 HOURS SCHEDULED
Status: DISCONTINUED | OUTPATIENT
Start: 2019-12-29 | End: 2019-12-29

## 2019-12-29 RX ORDER — AMLODIPINE BESYLATE 10 MG/1
10 TABLET ORAL DAILY
Status: DISCONTINUED | OUTPATIENT
Start: 2019-12-30 | End: 2019-12-30

## 2019-12-29 RX ORDER — KETOROLAC TROMETHAMINE 5 MG/ML
1 SOLUTION OPHTHALMIC 4 TIMES DAILY
Status: DISCONTINUED | OUTPATIENT
Start: 2019-12-29 | End: 2020-01-08 | Stop reason: HOSPADM

## 2019-12-29 RX ORDER — MAGNESIUM SULFATE 1 G/100ML
1 INJECTION INTRAVENOUS ONCE
Status: COMPLETED | OUTPATIENT
Start: 2019-12-29 | End: 2019-12-29

## 2019-12-29 RX ORDER — HEPARIN SODIUM 1000 [USP'U]/ML
4000 INJECTION, SOLUTION INTRAVENOUS; SUBCUTANEOUS ONCE
Status: COMPLETED | OUTPATIENT
Start: 2019-12-29 | End: 2019-12-29

## 2019-12-29 RX ORDER — FENTANYL 25 UG/H
1 PATCH TRANSDERMAL
Status: DISCONTINUED | OUTPATIENT
Start: 2019-12-29 | End: 2020-01-08 | Stop reason: HOSPADM

## 2019-12-29 RX ORDER — SEVELAMER HYDROCHLORIDE 800 MG/1
800 TABLET, FILM COATED ORAL
Status: DISCONTINUED | OUTPATIENT
Start: 2019-12-29 | End: 2020-01-08 | Stop reason: HOSPADM

## 2019-12-29 RX ORDER — SODIUM CHLORIDE 9 MG/ML
125 INJECTION, SOLUTION INTRAVENOUS CONTINUOUS
Status: DISCONTINUED | OUTPATIENT
Start: 2019-12-29 | End: 2019-12-29

## 2019-12-29 RX ORDER — POLYETHYLENE GLYCOL 3350 17 G/17G
17 POWDER, FOR SOLUTION ORAL DAILY
Status: DISCONTINUED | OUTPATIENT
Start: 2019-12-30 | End: 2020-01-08 | Stop reason: HOSPADM

## 2019-12-29 RX ORDER — OSELTAMIVIR PHOSPHATE 30 MG/1
30 CAPSULE ORAL
Status: DISCONTINUED | OUTPATIENT
Start: 2019-12-29 | End: 2019-12-30

## 2019-12-29 RX ORDER — DOCUSATE SODIUM 100 MG/1
100 CAPSULE, LIQUID FILLED ORAL 2 TIMES DAILY
Status: DISCONTINUED | OUTPATIENT
Start: 2019-12-29 | End: 2020-01-05

## 2019-12-29 RX ORDER — HEPARIN SODIUM 1000 [USP'U]/ML
2000 INJECTION, SOLUTION INTRAVENOUS; SUBCUTANEOUS AS NEEDED
Status: DISCONTINUED | OUTPATIENT
Start: 2019-12-29 | End: 2019-12-31

## 2019-12-29 RX ORDER — ASPIRIN 81 MG/1
81 TABLET, CHEWABLE ORAL DAILY
Status: DISCONTINUED | OUTPATIENT
Start: 2019-12-30 | End: 2020-01-08 | Stop reason: HOSPADM

## 2019-12-29 RX ORDER — LABETALOL 100 MG/1
100 TABLET, FILM COATED ORAL 2 TIMES DAILY
Status: DISCONTINUED | OUTPATIENT
Start: 2019-12-29 | End: 2019-12-30

## 2019-12-29 RX ORDER — NITROGLYCERIN 0.4 MG/1
0.4 TABLET SUBLINGUAL
Status: DISCONTINUED | OUTPATIENT
Start: 2019-12-29 | End: 2020-01-08 | Stop reason: HOSPADM

## 2019-12-29 RX ORDER — ATORVASTATIN CALCIUM 40 MG/1
40 TABLET, FILM COATED ORAL EVERY EVENING
Status: DISCONTINUED | OUTPATIENT
Start: 2019-12-29 | End: 2020-01-08 | Stop reason: HOSPADM

## 2019-12-29 RX ORDER — OSELTAMIVIR PHOSPHATE 30 MG/1
30 CAPSULE ORAL EVERY 24 HOURS
Status: DISCONTINUED | OUTPATIENT
Start: 2019-12-29 | End: 2019-12-29

## 2019-12-29 RX ORDER — OSELTAMIVIR PHOSPHATE 30 MG/1
30 CAPSULE ORAL ONCE
Status: DISCONTINUED | OUTPATIENT
Start: 2019-12-29 | End: 2020-01-02

## 2019-12-29 RX ADMIN — HEPARIN SODIUM 4000 UNITS: 1000 INJECTION INTRAVENOUS; SUBCUTANEOUS at 10:45

## 2019-12-29 RX ADMIN — MAGNESIUM SULFATE HEPTAHYDRATE 1 G: 1 INJECTION, SOLUTION INTRAVENOUS at 09:14

## 2019-12-29 RX ADMIN — FENTANYL 1 PATCH: 25 PATCH TRANSDERMAL at 22:12

## 2019-12-29 RX ADMIN — SODIUM CHLORIDE 125 ML/HR: 0.9 INJECTION, SOLUTION INTRAVENOUS at 10:34

## 2019-12-29 RX ADMIN — HEPARIN SODIUM 2000 UNITS: 1000 INJECTION INTRAVENOUS; SUBCUTANEOUS at 17:54

## 2019-12-29 RX ADMIN — HEPARIN SODIUM AND DEXTROSE 11.1 UNITS/KG/HR: 10000; 5 INJECTION INTRAVENOUS at 10:42

## 2019-12-29 NOTE — ED NOTES
Pt sitting up right in stretcher with no complaints  Pt aware we are awaiting dialysis team to arrive  Provided pt with warm blankets per request  Will continue to monitor        Adis Hwang RN  12/29/19 6886

## 2019-12-29 NOTE — H&P
H&P- Meda Nageotte 1935, 81 y o  female MRN: [id number]    Unit/Bed#: ED 09 Encounter: [id number]    Primary Care Provider: Khadijah Armenta MD   Date and time admitted to hospital: 12/29/2019  7:21 AM        * Influenza A  Assessment & Plan  · Tamiflu x 5 days  · Mucinex to help with mucolytic purposes  · Tereza Rosa Elena will be provided  · Monitor temperatures - Tylenol as needed  · P r n  Robitussin DM  · Xopenex nebulizers t i d  For wheezing  · Supportive care  · Oxygen as needed  · Adjust treatment as needed  Elevated troponin, rule out NSTEMI  Assessment & Plan  · Anticoagulation - heparin drip for now  · Anti-platelet -  Aspirin  · Beta-blocker - continue labetalol  · Statin - continue atorvastatin  · Nitrate - p r n  Nitroglycerin  · Evaluation -   · Cardiology consultation  · Trend troponins as well as serial EKGs  · Monitor for chest pain symptoms  Currently patient denies any chest pain symptoms  · While troponin can be elevated in end-stage renal disease, this elevations seems a bit higher  ESRD on hemodialysis [facility])  Assessment & Plan  · Nephrology will be following the patient  Patient did miss dialysis yesterday and will be receiving dialysis today  · Avoid giving IV fluids in less absolutely necessary in the setting of end-stage renal disease  · Continue medications  Essential hypertension  Assessment & Plan  · Continue medications  · Monitor blood pressures  Continuous opioid dependence (HCC)  Assessment & Plan  · Continue medications  · No escalations of therapy currently  Chronic Aphasia / Cognitive impairment  Assessment & Plan  · Supportive care  · Patient is a limited historian        VTE Prophylaxis: Heparin Drip  / sequential compression device   Code Status: DNR / DNI level 3  POLST: There is no POLST form on file for this patient (pre-hospital)    Anticipated Length of Stay:  Patient will be admitted on an Inpatient basis with an anticipated length of stay of  > 2 midnights  Justification for Hospital Stay: evaluation and treatment for above medical problems  Total Time for Visit, including Counseling / Coordination of Care: 45 minutes  Greater than 50% of this total time spent on direct patient counseling and coordination of care  Chief Complaint:   Shortness of breath    History of Present Illness:    Angeles Estrada is a 81 y o  female who presents with increased shortness of breath  The patient was sent in from the dialysis center due to tachypnea and the parents that she was very short of breath with difficulty getting deep breaths  The patient herself does admit to some shortness of breath and also states that she has had a cough with some congestion  She states that she is bringing up phlegm that is mostly clear  The patient was seen in the emergency department yesterday for cough and congestion but workup was negative so the patient was sent home  However, after being sent back into the hospital today, it is found that the patient's influenza testing is positive  The patient denies any chest pain but the patient is found to also have an elevated troponin over 2  She denies any palpitations or dizziness  Review of Systems:    Review of Systems   Unable to perform ROS: Other   All other systems reviewed and are negative    Patient is minimally verbal at baseline    Past Medical and Surgical History:     Past Medical History:   Diagnosis Date    Altered mental status, unspecified     Anemia     Atherosclerotic heart disease of native coronary artery without angina pectoris     Cancer (HCC)     Chest pain     Chronic diastolic (congestive) heart failure (HCC)     Dependence [facility] [address] )     [facility]     End-stage renal disease (HCC)     Gastro-esophageal reflux disease without esophagitis     Hyperlipidemia     Hypertension     Long term current use of anticoagulant     Long term current use of insulin (HCC)     Muscle weakness     Nausea with vomiting     Other abnormalities of gait and mobility     Other specified abnormal findings of blood chemistry     Type 2 diabetes mellitus (HCC)     Ventral hernia without obstruction or gangrene     Vitamin D deficiency        Past Surgical History:   Procedure Laterality Date    AV FISTULA PLACEMENT      IR [address] EXCHANGE  12/27/2019    MASTECTOMY      R arm       Meds/Allergies:    Prior to Admission medications    Medication Sig Start Date End Date Taking?  Authorizing Provider   acetaminophen (TYLENOL) 325 mg tablet Take 650 mg by mouth every 6 (six) hours as needed for mild pain    Historical Provider, MD   albuterol (2 5 mg/3 mL) 0 083 % nebulizer solution Take 2 5 mg by nebulization every 6 (six) hours as needed for wheezing    Historical Provider, MD   albuterol (PROVENTIL HFA,VENTOLIN HFA) 90 mcg/act inhaler Inhale 2 puffs every 6 (six) hours as needed for wheezing    Historical Provider, MD   amLODIPine (NORVASC) 10 mg tablet Take 1 tablet (10 mg total) by mouth daily Home med 12/28/19   Mirlande Vasquez PA-C   aspirin 81 mg chewable tablet Chew 1 tablet (81 mg total) daily 12/27/19   Christin Johnson PA-C   atorvastatin (LIPITOR) 40 mg tablet Take 1 tablet (40 mg total) by mouth every evening 1/8/19   Kiara Noyola PA-C   bisacodyl (DULCOLAX) 5 mg EC tablet Take 5 mg by mouth daily as needed for constipation    Historical Provider, MD   busPIRone (BUSPAR) 5 mg tablet Take 5 mg by mouth 3 (three) times a day    Historical Provider, MD   docusate sodium (COLACE) 100 mg capsule Take 100 mg by mouth 2 (two) times a day    Historical Provider, MD   fentaNYL (DURAGESIC) 25 mcg/hr Place 1 patch on the skin every third day    Historical Provider, MD   guaiFENesin (MUCINEX) 600 mg 12 hr tablet Take 1 tablet (600 mg total) by mouth every 12 (twelve) hours 1/8/19   Kiara Noyola PA-C   ketorolac (ACULAR) 0 4 % SOLN 1 drop 4 (four) times a day Historical Provider, MD   labetalol (NORMODYNE) 100 mg tablet Take 100 mg by mouth 2 (two) times a day    Historical Provider, MD   nitroglycerin (NITROSTAT) 0 4 mg SL tablet Place 0 4 mg under the tongue every 5 (five) minutes as needed for chest pain    Historical Provider, MD   polyethylene glycol (MIRALAX) 17 g packet Take 17 g by mouth daily    Historical Provider, MD   sertraline (ZOLOFT) 25 mg tablet Take 50 mg by mouth daily    Historical Provider, MD   sevelamer (RENAGEL) 800 mg tablet Take 1 tablet (800 mg total) by mouth 3 (three) times a day with meals Home med 12/27/19   Amina Vitale PA-C     I have reviewed home medications using allscripts  (epic record)    Allergies: Allergies   Allergen Reactions    Percolone [Oxycodone]     Sulfa Antibiotics        Social History:     Marital Status:      Substance Use History:   Social History     Substance and Sexual Activity   Alcohol Use No     Social History     Tobacco Use   Smoking Status Never Smoker   Smokeless Tobacco Never Used     Social History     Substance and Sexual Activity   Drug Use No       Family History:    non-contributory    Physical Exam:     Vitals:   Blood Pressure: (!) 181/81 (12/29/19 1246)  Pulse: 78 (12/29/19 1246)  Temperature: 97 7 °F (36 5 °C) (12/29/19 0732)  Temp Source: Oral (12/29/19 0732)  Respirations: 21 (12/29/19 1246)  Weight - Scale: 94 kg (207 lb 3 7 oz) (12/29/19 1036)  SpO2: 100 % (12/29/19 1246)    Physical Exam   HENT:   Mouth/Throat: No oropharyngeal exudate  Oral mucosa slightly dry  Eyes: Pupils are equal, round, and reactive to light  Neck: No JVD present  Cardiovascular: Normal rate and regular rhythm  Pulmonary/Chest:   Decreased breath sounds with trace wheezes bilaterally  No rhonchi or rales currently  Abdominal: Soft  Bowel sounds are normal  She exhibits no distension  There is no tenderness  Musculoskeletal: She exhibits no edema or tenderness     Lymphadenopathy:     She has no cervical adenopathy  Neurological:   While awake and alert, the patient is mostly nonverbal stating only a few words intermittently in response to questions  Some of her speech is difficult to understand  Skin: Skin is warm and dry  No rash noted  Vitals reviewed  Additional Data:     Lab Results: I have personally reviewed pertinent reports  Results from last 7 days   Lab Units 12/29/19  0849   WBC Thousand/uL 9 65   HEMOGLOBIN g/dL 11 3*   HEMATOCRIT % 37 8   PLATELETS Thousands/uL 147*   NEUTROS PCT % 89*   LYMPHS PCT % 5*   MONOS PCT % 6   EOS PCT % 0     Results from last 7 days   Lab Units 12/29/19  0849   POTASSIUM mmol/L 4 6   CHLORIDE mmol/L 101   CO2 mmol/L 22   BUN mg/dL 68*   CREATININE mg/dL 7 25*   CALCIUM mg/dL 8 3   ALK PHOS U/L 103   ALT U/L 57   AST U/L 58*     Results from last 7 days   Lab Units 12/29/19  0849   INR  1 20*       Imaging: I have personally reviewed pertinent reports  Xr Chest 1 View Portable    Result Date: 12/29/2019  Narrative: CHEST INDICATION:   AMS, hypoxia  COMPARISON:  Chest x-ray from 12/26/2019  EXAM PERFORMED/VIEWS:  XR CHEST PORTABLE FINDINGS:  Unchanged left IJ central line  Heart shadow is enlarged but unchanged from prior exam  The lungs are clear  No pneumothorax or pleural effusion  Osseous structures appear within normal limits for patient age  Impression: No acute cardiopulmonary disease  Workstation performed: [id number]     Ct Head Without Contrast    Result Date: 12/28/2019  Narrative: CT BRAIN - WITHOUT CONTRAST INDICATION:   ams, weakness  COMPARISON:  CT stroke alert May 24, 2013 TECHNIQUE:  CT examination of the brain was performed  In addition to axial images, coronal 2D reformatted images were created and submitted for interpretation  Radiation dose length product (DLP) for this visit:  944 mGy-cm     This examination, like all CT scans performed in the [facility], was performed utilizing techniques to minimize radiation dose exposure, including the use of iterative reconstruction and automated exposure control  IMAGE QUALITY:  Diagnostic  FINDINGS: PARENCHYMA:  No acute intraparenchymal hemorrhage or extra-axial fluid collections  No acute infarctions  Encephalomalacia and gliosis left frontal lobe  Decreased attenuation in the periventricular regions and centrum semiovale bilaterally, consistent with chronic small vessel ischemic white matter disease  Bilateral internal carotid artery and vertebral artery calcifications  VENTRICLES AND EXTRA-AXIAL SPACES:  Enlargement of ventricles and extra-axial CSF spaces, out of proportion to the patient's age most consistent with cerebral and cerebellar atrophy  VISUALIZED ORBITS AND PARANASAL SINUSES:  Globes are symmetric and intact  Prior right-sided lens surgery  Visualized paranasal sinuses and mastoid air cells are clear  CALVARIUM AND EXTRACRANIAL SOFT TISSUES:  Normal      Impression: Stable cerebral atrophy with chronic small vessel ischemic white matter disease and chronic infarction  No acute intracranial abnormality  If there is continued concern for acute ischemic event, a follow-up MRI of the brain with diffusion-weighted imaging should be obtained  Workstation performed: [id number]     EKG, Pathology, and Other Studies Reviewed on Admission:   · EKG: minmal ST depressions in the inferior leads but no other ST depressions or ST elevations  Sinus rhythm  Epic / Care Everywhere Records Reviewed: Yes     ** Please Note: This note has been constructed using a voice recognition system   **

## 2019-12-29 NOTE — ED NOTES
Spoke with pts daughter on phone, naman  Updated on status and plan of care        Italia Johnson RN  12/29/19 6019

## 2019-12-29 NOTE — ASSESSMENT & PLAN NOTE
· Nephrology will be following the patient  Patient did miss dialysis yesterday and will be receiving dialysis today  · Avoid giving IV fluids in less absolutely necessary in the setting of end-stage renal disease  · Continue medications

## 2019-12-29 NOTE — ASSESSMENT & PLAN NOTE
· Tamiflu x 5 days  · Mucinex to help with mucolytic purposes  · Tessalon Perles will be provided  · Monitor temperatures - Tylenol as needed  · P r n  Robitussin DM  · Xopenex nebulizers t i d  For wheezing  · Supportive care  · Oxygen as needed  · Adjust treatment as needed

## 2019-12-29 NOTE — HEMODIALYSIS
Tx completed via catheter  BFR as prescribed  2 l fluid removal   Pt hypotensive  Fluids given, goal decreased, Tx dc'd 30 minutes early  Unable to weigh Pt at the end of tx due to no scale on stretcher  Pt will receive tx again tomorrow

## 2019-12-29 NOTE — ED NOTES
Pt being dialyzed at this time  Will transport pt to assigned room when dialysis finished       Osvaldo Clay, ARSALAN  12/29/19 8086

## 2019-12-29 NOTE — ASSESSMENT & PLAN NOTE
· Anticoagulation - heparin drip for now  · Anti-platelet -  Aspirin  · Beta-blocker - continue labetalol  · Statin - continue atorvastatin  · Nitrate - p r n  Nitroglycerin  · Evaluation -   · Cardiology consultation  · Trend troponins as well as serial EKGs  · Monitor for chest pain symptoms  Currently patient denies any chest pain symptoms  · While troponin can be elevated in end-stage renal disease, this elevations seems a bit higher

## 2019-12-29 NOTE — CONSULTS
Consultation - Nephrology   Bre Lopes 81 y o  female MRN: [id number]  Unit/Bed#: ED 09 Encounter: [id number]    ASSESSMENT and PLAN:  · ESRD on HD:  Hemodialysis TTS at [facility]  Outpatient target weight 82 5 kg  · Patient did not receive hemodialysis yesterday  Will plan for hemodialysis today  · Access:  Left IJ PermCath placed on 12/27  · Clinically appears hypervolemic and blood pressure elevated  Will plan for ultrafiltration on HD  · Labs reviewed  · Anemia due to chronic kidney disease:  Hemoglobin is at goal   Doris Rodriguez as outpatient and last dose on 12/23   Continue to monitor hemoglobin  · Fluid overload/lower extremity edema: Will do ultrafiltration on HD  · Primary Hypertension with chronic kidney disease:  Blood pressure elevated  Continue to monitor, plan for ultrafiltration on HD  Continue home medication  · 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:  Continue renvela well as Hectorol  · Shortness of breath-likely due to flu as well as fluid overload-continue renally dosed Tamiflu, will do ultrafiltration on HD  Discussed with Dr Shaniqua Bell  HISTORY OF PRESENT ILLNESS:  Requesting Physician: Jose Lara DO  Reason for Consult:  ESRD on hemodialysis  Bre Lopes is a 81 y o  female with history of ESRD on hemodialysis TTS at [facility] , hypertension, who was admitted to [facility] after presenting with complain of shortness of breath  Patient was recently in the hospital on 12/27 for PermCath malfunction  She had new PermCath placed on left IJ on 12/27 underwent dialysis  Her outpatient dry weight is 87 0 kg  She was sent to the ED from  outpatient dialysis unit on 12/28 for shortness of breath low oxygen saturation    She did not undergo dialysis on 12/28, she was sent back to her facility from ED as oxygen saturation was okay in the ED and chest x-ray did not show any sign of fluid overload or pneumonia per ED note   Patient was supposed to undergo hemodialysis treatment today as outpatient  She instead was sent to the ED for complain of difficulty breathing, tachypnea  She was found to be flu positive in the ED  Nephrology consulted for management of ESRD  Patient not able to provide much information but complains of cough  PAST MEDICAL HISTORY:  Past Medical History:   Diagnosis Date    Altered mental status, unspecified     Anemia     Atherosclerotic heart disease of native coronary artery without angina pectoris     Cancer (HCC)     Chest pain     Chronic diastolic (congestive) heart failure (HCC)     [facility] [address] )     Diabetes mellitus (HCC)     End-stage renal disease (HCC)     Gastro-esophageal reflux disease without esophagitis     Hyperlipidemia     Hypertension     Long term current use of anticoagulant     Long term current use of insulin (HCC)     Muscle weakness     Nausea with vomiting     Other abnormalities of gait and mobility     Other specified abnormal findings of blood chemistry     Type 2 diabetes mellitus (HCC)     Ventral hernia without obstruction or gangrene     Vitamin D deficiency        PAST SURGICAL HISTORY:  Past Surgical History:   Procedure Laterality Date    AV FISTULA PLACEMENT      IR [facility] EXCHANGE  12/27/2019    MASTECTOMY      R arm       SOCIAL HISTORY:  Social History     Substance and Sexual Activity   Alcohol Use No     Social History     Substance and Sexual Activity   Drug Use No     Social History     Tobacco Use   Smoking Status Never Smoker   Smokeless Tobacco Never Used       FAMILY HISTORY:  History reviewed  No pertinent family history      ALLERGIES:  Allergies   Allergen Reactions    Percolone [Oxycodone]     Sulfa Antibiotics        MEDICATIONS:    Current Facility-Administered Medications:     heparin (porcine) 25,000 units in 250 mL infusion (premix), 3-20 Units/kg/hr (Order-Specific), Intravenous, Titrated, Felipa Jackson PA-C, Last Rate: 10 mL/hr at 12/29/19 1042, 11 1 Units/kg/hr at 12/29/19 1042    heparin (porcine) injection 2,000 Units, 2,000 Units, Intravenous, PRN, Felipa Jackson PA-C    heparin (porcine) injection 4,000 Units, 4,000 Units, Intravenous, PRN, Felipa Jackson PA-C    oseltamivir (TAMIFLU) capsule 30 mg, 30 mg, Oral, Once, Felipa Jackson PA-C    Current Outpatient Medications:     acetaminophen (TYLENOL) 325 mg tablet, Take 650 mg by mouth every 6 (six) hours as needed for mild pain, Disp: , Rfl:     albuterol (2 5 mg/3 mL) 0 083 % nebulizer solution, Take 2 5 mg by nebulization every 6 (six) hours as needed for wheezing, Disp: , Rfl:     albuterol (PROVENTIL HFA,VENTOLIN HFA) 90 mcg/act inhaler, Inhale 2 puffs every 6 (six) hours as needed for wheezing, Disp: , Rfl:     amLODIPine (NORVASC) 10 mg tablet, Take 1 tablet (10 mg total) by mouth daily Home med, Disp: , Rfl: 0    aspirin 81 mg chewable tablet, Chew 1 tablet (81 mg total) daily, Disp: 30 tablet, Rfl: 0    atorvastatin (LIPITOR) 40 mg tablet, Take 1 tablet (40 mg total) by mouth every evening, Disp: 30 tablet, Rfl: 0    bisacodyl (DULCOLAX) 5 mg EC tablet, Take 5 mg by mouth daily as needed for constipation, Disp: , Rfl:     busPIRone (BUSPAR) 5 mg tablet, Take 5 mg by mouth 3 (three) times a day, Disp: , Rfl:     docusate sodium (COLACE) 100 mg capsule, Take 100 mg by mouth 2 (two) times a day, Disp: , Rfl:     fentaNYL (DURAGESIC) 25 mcg/hr, Place 1 patch on the skin every third day, Disp: , Rfl:     guaiFENesin (MUCINEX) 600 mg 12 hr tablet, Take 1 tablet (600 mg total) by mouth every 12 (twelve) hours, Disp: 30 tablet, Rfl: 0    ketorolac (ACULAR) 0 4 % SOLN, 1 drop 4 (four) times a day, Disp: , Rfl:     labetalol (NORMODYNE) 100 mg tablet, Take 100 mg by mouth 2 (two) times a day, Disp: , Rfl:     nitroglycerin (NITROSTAT) 0 4 mg SL tablet, Place 0 4 mg under the tongue every 5 (five) minutes as needed for chest pain, Disp: , Rfl:     polyethylene glycol (MIRALAX) 17 g packet, Take 17 g by mouth daily, Disp: , Rfl:     sertraline (ZOLOFT) 25 mg tablet, Take 50 mg by mouth daily, Disp: , Rfl:     sevelamer (RENAGEL) 800 mg tablet, Take 1 tablet (800 mg total) by mouth 3 (three) times a day with meals Home med, Disp: , Rfl: 0    REVIEW OF SYSTEMS:   Review of Systems   Constitutional: Negative for activity change, appetite change, chills, diaphoresis, fatigue and fever  HENT: Negative for congestion, facial swelling and nosebleeds  Eyes: Negative for pain and visual disturbance  Respiratory: Positive for cough and shortness of breath  Negative for chest tightness  Cardiovascular: Negative for chest pain and palpitations  Gastrointestinal: Negative for abdominal distention, abdominal pain, diarrhea, nausea and vomiting  Genitourinary: Negative for difficulty urinating, dysuria, flank pain, frequency and hematuria  Musculoskeletal: Negative for arthralgias, back pain and joint swelling  Skin: Negative for rash  Neurological: Negative for dizziness, seizures, syncope, weakness and headaches  Psychiatric/Behavioral: Negative for agitation and confusion  The patient is not nervous/anxious  All the systems were reviewed and were negative except as documented on the HPI      PHYSICAL EXAM:  Current Weight: Weight - Scale: 94 kg (207 lb 3 7 oz)  First Weight: Weight - Scale: 94 kg (207 lb 3 7 oz)  Vitals:    12/29/19 0731 12/29/19 0732 12/29/19 1033 12/29/19 1036   BP: 167/60  170/77    BP Location: Right arm  Right arm    Pulse: 73  71    Resp: 22  20    Temp:  97 7 °F (36 5 °C)     TempSrc:  Oral     SpO2: 97%  100%    Weight:    94 kg (207 lb 3 7 oz)       Intake/Output Summary (Last 24 hours) at 12/29/2019 1052  Last data filed at 12/29/2019 1052  Gross per 24 hour   Intake 87 5 ml   Output    Net 87 5 ml     Physical Exam   Constitutional: Vital signs are normal  She appears well-developed  No distress  HENT:   Head: Normocephalic and atraumatic  Mouth/Throat: Oropharynx is clear and moist    Eyes: Pupils are equal, round, and reactive to light  Conjunctivae are normal  No scleral icterus  Neck: Neck supple  No thyromegaly present  Cardiovascular: Normal rate, regular rhythm and normal heart sounds  Exam reveals no friction rub  No murmur heard  Pulmonary/Chest: Effort normal and breath sounds normal  No respiratory distress  She has no wheezes  She has no rales  Abdominal: Soft  Bowel sounds are normal  She exhibits no distension  There is no tenderness  Musculoskeletal: She exhibits edema (Bilateral lower extremity 1+ pitting edema)  She exhibits no deformity  Lymphadenopathy:     She has no cervical adenopathy  Neurological: She is alert  She is not disoriented  Awake alert and following commands   Skin: Skin is warm and dry  She is not diaphoretic  No cyanosis  No pallor  Nails show no clubbing  Psychiatric: Her mood appears not anxious  Her affect is not inappropriate  Awake alert and answering questions         Invasive Devices:        Lab Results:   Results from last 7 days   Lab Units 12/29/19  0849 12/28/19  0839 12/27/19  0431 12/26/19  1619   WBC Thousand/uL 9 65 7 36  --  6 76   HEMOGLOBIN g/dL 11 3* 10 2*  --  10 3*   HEMATOCRIT % 37 8 34 2*  --  34 5*   PLATELETS Thousands/uL 147* 137*  --  146*  154   POTASSIUM mmol/L 4 6 4 1 4 8  --    CHLORIDE mmol/L 101 101 107  --    CO2 mmol/L 22 24 24  --    BUN mg/dL 68* 49* 66*  --    CREATININE mg/dL 7 25* 5 88* 7 59*  --    CALCIUM mg/dL 8 3 8 2* 8 7  --    ALK PHOS U/L 103  --   --   --    ALT U/L 57  --   --   --    AST U/L 58*  --   --   --        Other Studies:  Chest x-ray personally reviewed by me  Finding suggestive of pulmonary congestion  Portions of the record may have been created with voice recognition software   Occasional wrong word or "sound a like" substitutions may have occurred due to the inherent limitations of voice recognition software  Read the chart carefully and recognize, using context, where substitutions have occurred  If you have any questions, please contact the dictating provider

## 2019-12-29 NOTE — TELEPHONE ENCOUNTER
Adam York from [address] [place] [address] [phone] called to report this patient's Fentamyl Patch is missing and to obtain an order to reapply it  A TT was sent to Dr  [address] @ this time

## 2019-12-29 NOTE — SEPSIS NOTE
Sepsis Note   Dione Aguilar 81 y o  female MRN: [id number]  Unit/Bed#: ED 09 Encounter: [id number]      qSOFA     [address] Name 12/29/19 0731                Altered mental status GCS < 15          Respiratory Rate > / =58  1        Systolic BP < / =848  0        Q Sofa Score  1            Initial Sepsis Screening     Row Name 12/29/19 0954                Is the patient's history suggestive of a new or worsening infection? (!) Yes (Proceed)  -JG        Suspected source of infection  pneumonia  -JG        Are two or more of the following signs & symptoms of infection both present and new to the patient? No  -JG        Indicate SIRS criteria  Tachypnea > 20 resp per min  -JG        If the answer is yes to both questions, suspicion of sepsis is present          If severe sepsis is present AND tissue hypoperfusion perists in the hour after fluid resuscitation or lactate > 4, the patient meets criteria for SEPTIC SHOCK          Are any of the following organ dysfunction criteria present within 6 hours of suspected infection and SIRS criteria that are NOT considered to be chronic conditions? No  -JG        Organ dysfunction          Date of presentation of severe sepsis          Time of presentation of severe sepsis          Tissue hypoperfusion persists in the hour after crystalloid fluid administration, evidenced, by either:          Was hypotension present within one hour of the conclusion of crystalloid fluid administration?           Date of presentation of septic shock          Time of presentation of septic shock            User Key  (r) = Recorded By, (t) = Taken By, (c) = Cosigned By    [address] Name Provider Type    [address] PA-C Physician Assistant

## 2019-12-29 NOTE — ED PROVIDER NOTES
History  Chief Complaint   Patient presents with    Shortness of Breath     pt arrives BLS from dialysis center  Rn there reports pt was "gasping for air unable to breath"  pt grunts however sats at 97%  seen and d/c'd here yesterday  Patient presents from the dialysis center by EMS  She was seen in the Emergency yesterday for cough and nasal congestion  She was discharged home  She was set up to have her dialysis that was 2 yesterday this morning  Upon arrival to the dialysis center, the nurse stated that she was gasping for air and have was having difficulty breathing  Patient does not answer questions well  She seems to be tachypneic  Her pulse ox on arrival on 2 L was 97%  She is under able to tell me whether she has any chest or abdominal pain  Past medical history is positive for altered mental status, anemia, atherosclerotic heart disease, cancer, chest pain, congestive heart failure, end-stage renal disease on dialysis, diabetes mellitus, GERD, hyperlipidemia, hypertension, gait dysfunction, ventral hernia, vitamin-D deficiency  History provided by:  EMS personnel (Dialysis center)  History limited by:  Age and acuity of condition  Shortness of Breath   Severity:  Moderate  Onset quality:  Gradual  Duration:  2 days  Timing:  Constant  Progression:  Worsening  Associated symptoms: cough        Prior to Admission Medications   Prescriptions Last Dose Informant Patient Reported?  Taking?   acetaminophen (TYLENOL) 325 mg tablet  Outside Facility (Specify) Yes No   Sig: Take 650 mg by mouth every 6 (six) hours as needed for mild pain   albuterol (2 5 mg/3 mL) 0 083 % nebulizer solution  Outside Facility (Specify) Yes No   Sig: Take 2 5 mg by nebulization every 6 (six) hours as needed for wheezing   albuterol (PROVENTIL HFA,VENTOLIN HFA) 90 mcg/act inhaler  Outside Facility (Specify) Yes No   Sig: Inhale 2 puffs every 6 (six) hours as needed for wheezing   amLODIPine (NORVASC) 10 mg tablet   No No   Sig: Take 1 tablet (10 mg total) by mouth daily Home med   aspirin 81 mg chewable tablet   No No   Sig: Chew 1 tablet (81 mg total) daily   atorvastatin (LIPITOR) 40 mg tablet   No No   Sig: Take 1 tablet (40 mg total) by mouth every evening   bisacodyl (DULCOLAX) 5 mg EC tablet  Outside Facility (Specify) Yes No   Sig: Take 5 mg by mouth daily as needed for constipation   busPIRone (BUSPAR) 5 mg tablet  Outside Facility (Specify) Yes No   Sig: Take 5 mg by mouth 3 (three) times a day   docusate sodium (COLACE) 100 mg capsule  Outside Facility (Specify) Yes No   Sig: Take 100 mg by mouth 2 (two) times a day   fentaNYL (DURAGESIC) 25 mcg/hr  Outside Facility (Specify) Yes No   Sig: Place 1 patch on the skin every third day   guaiFENesin (MUCINEX) 600 mg 12 hr tablet   No No   Sig: Take 1 tablet (600 mg total) by mouth every 12 (twelve) hours   ketorolac (ACULAR) 0 4 % SOLN  Outside Facility (Specify) Yes No   Si drop 4 (four) times a day   labetalol (NORMODYNE) 100 mg tablet  Outside Facility (Specify) Yes No   Sig: Take 100 mg by mouth 2 (two) times a day   nitroglycerin (NITROSTAT) 0 4 mg SL tablet  Outside Facility (Specify) Yes No   Sig: Place 0 4 mg under the tongue every 5 (five) minutes as needed for chest pain   polyethylene glycol (MIRALAX) 17 g packet  Outside Facility (Specify) Yes No   Sig: Take 17 g by mouth daily   sertraline (ZOLOFT) 25 mg tablet  Outside Facility (Specify) Yes No   Sig: Take 50 mg by mouth daily   sevelamer (RENAGEL) 800 mg tablet   No No   Sig: Take 1 tablet (800 mg total) by mouth 3 (three) times a day with meals Home med      Facility-Administered Medications: None       Past Medical History:   Diagnosis Date    Altered mental status, unspecified     Anemia     Atherosclerotic heart disease of native coronary artery without angina pectoris     Cancer (HCC)     Chest pain     Chronic diastolic (congestive) heart failure (HCC)     Dependence on renal dialysis (HCC)  Diabetes mellitus ([facility] )     End-stage renal disease ([facility] )     Gastro-esophageal reflux disease without esophagitis     Hyperlipidemia     Hypertension     Long term current use of anticoagulant     Long term current use of insulin (HCC)     Muscle weakness     Nausea with vomiting     Other abnormalities of gait and mobility     Other specified abnormal findings of blood chemistry     Type [facility] diabetes mellitus ([facility]     Ventral hernia without obstruction or gangrene     Vitamin D deficiency        Past Surgical History:   Procedure Laterality Date    AV FISTULA PLACEMENT      IR JUSTYN HAYES [facility] EXCHANGE  12/27/2019    MASTECTOMY      R arm       History reviewed  No pertinent family history  I have reviewed and agree with the history as documented  Social History     Tobacco Use    Smoking status: Never Smoker    Smokeless tobacco: Never Used   Substance Use Topics    Alcohol use: No    Drug use: No        Review of Systems   Unable to perform ROS: Age   Constitutional: Negative for activity change  Respiratory: Positive for cough and shortness of breath  All other systems reviewed and are negative  Physical Exam  Physical Exam   Constitutional: She is oriented to person, place, and time  She appears well-developed and well-nourished  Non-toxic appearance  She appears ill  She appears distressed  HENT:   Head: Normocephalic  Neck: Neck supple  JVD present  No thyromegaly present  Cardiovascular: Normal rate, regular rhythm and normal heart sounds  Exam reveals no gallop  No murmur heard  Pulmonary/Chest: No accessory muscle usage  Tachypnea noted  She is in respiratory distress  She has rhonchi in the right upper field, the right middle field, the right lower field, the left upper field, the left middle field and the left lower field  Abdominal: Soft  She exhibits no distension  There is no splenomegaly or hepatomegaly  There is no rebound and no guarding  Musculoskeletal:        Right lower leg: She exhibits edema  Left lower leg: She exhibits edema  Lymphadenopathy:     She has no cervical adenopathy  Neurological: She is alert and oriented to person, place, and time  Skin: Skin is warm  Capillary refill takes less than 2 seconds  Psychiatric: She has a normal mood and affect  Her behavior is normal    Nursing note and vitals reviewed        Vital Signs  ED Triage Vitals   Temperature Pulse Respirations Blood Pressure SpO2   12/29/19 0732 12/29/19 0731 12/29/19 0731 12/29/19 0731 12/29/19 0731   97 7 °F (36 5 °C) 73 22 167/60 97 %      Temp Source Heart Rate Source Patient Position - Orthostatic VS BP Location FiO2 (%)   12/29/19 0732 12/29/19 0731 12/29/19 0731 12/29/19 0731 --   Oral Monitor Lying Right arm       Pain Score       12/29/19 0731       No Pain           Vitals:    12/29/19 1033 12/29/19 1246 12/29/19 1500 12/29/19 1530   BP: 170/77 (!) 181/81 (!) 193/80 153/73   Pulse: 71 78 72 64   Patient Position - Orthostatic VS: Lying Lying Lying          Visual Acuity  Visual Acuity      Most Recent Value   L Pupil Size (mm)  4   R Pupil Size (mm)  4   L Pupil Shape  Round   R Pupil Shape  Round          ED Medications  Medications   oseltamivir (TAMIFLU) capsule 30 mg (30 mg Oral Not Given 12/29/19 1017)   heparin (porcine) 25,000 units in 250 mL infusion (premix) (11 1 Units/kg/hr × 90 kg (Order-Specific) Intravenous New Bag 12/29/19 1042)   heparin (porcine) injection 4,000 Units (has no administration in time range)   heparin (porcine) injection 2,000 Units (has no administration in time range)   doxercalciferol (HECTOROL) injection 1 mcg (has no administration in time range)   magnesium sulfate IVPB (premix) SOLN 1 g (0 g Intravenous Stopped 12/29/19 1016)   heparin (porcine) injection 4,000 Units (4,000 Units Intravenous Given 12/29/19 1045)       Diagnostic Studies  Results Reviewed     Procedure Component Value Units Date/Time    Blood culture #1 [[id number]] Collected:  12/29/19 0907    Lab Status:  Preliminary result Specimen:  Blood from Hand, Right Updated:  12/29/19 1301     Blood Culture Received in Microbiology Lab  Culture in Progress  Blood culture #2 [[id number]] Collected:  12/29/19 0849    Lab Status:  Preliminary result Specimen:  Blood from Arm, Right Updated:  12/29/19 1301     Blood Culture Received in Microbiology Lab  Culture in Progress  Magnesium [[id number]]  (Normal) Collected:  12/29/19 0849    Lab Status:  Final result Specimen:  Blood from Arm, Right Updated:  12/29/19 1222     Magnesium 2 5 mg/dL     Influenza A/B and RSV PCR [[id number]]  (Abnormal) Collected:  12/29/19 0849    Lab Status:  Final result Specimen:  Nasopharyngeal Swab Updated:  12/29/19 0942     INFLUENZA A PCR Detected     INFLUENZA B PCR None Detected     RSV PCR None Detected    APTT six (6) hours after Heparin bolus/drip initiation or dosing change [[id number]]     Lab Status:  No result Specimen:  Blood     APTT six (6) hours after Heparin bolus/drip initiation or dosing change [[id number]]     Lab Status:  No result Specimen:  Blood     Lactic acid, plasma x2 [[id number]]  (Normal) Collected:  12/29/19 0849    Lab Status:  Final result Specimen:  Blood from Arm, Right Updated:  12/29/19 0920     LACTIC ACID 0 9 mmol/L     Narrative:       Result may be elevated if tourniquet was used during collection      Comprehensive metabolic panel [[id number]]  (Abnormal) Collected:  12/29/19 0849    Lab Status:  Final result Specimen:  Blood from Arm, Right Updated:  12/29/19 0918     Sodium 139 mmol/L      Potassium 4 6 mmol/L      Chloride 101 mmol/L      CO2 22 mmol/L      ANION GAP 16 mmol/L      BUN 68 mg/dL      Creatinine 7 25 mg/dL      Glucose 141 mg/dL      Calcium 8 3 mg/dL      AST 58 U/L      ALT 57 U/L      Alkaline Phosphatase 103 U/L      Total Protein 7 7 g/dL      Albumin 3 5 g/dL      Total Bilirubin 0 73 mg/dL      eGFR 5 ml/min/1 73sq m Narrative:       National Kidney Disease Foundation guidelines for Chronic Kidney Disease (CKD):     Stage 1 with normal or high GFR (GFR > 90 mL/min/1 73 square meters)    Stage 2 Mild CKD (GFR = 60-89 mL/min/1 73 square meters)    Stage 3A Moderate CKD (GFR = 45-59 mL/min/1 73 square meters)    Stage 3B Moderate CKD (GFR = 30-44 mL/min/1 73 square meters)    Stage 4 Severe CKD (GFR = 15-29 mL/min/1 73 square meters)    Stage 5 End Stage CKD (GFR <15 mL/min/1 73 square meters)  Note: GFR calculation is accurate only with a steady state creatinine    Troponin I [[id number]]  (Abnormal) Collected:  12/29/19 0849    Lab Status:  Final result Specimen:  Blood from Arm, Right Updated:  12/29/19 0917     Troponin I 2 19 ng/mL     Protime-INR [[id number]]  (Abnormal) Collected:  12/29/19 0849    Lab Status:  Final result Specimen:  Blood from Arm, Right Updated:  12/29/19 0910     Protime 14 6 seconds      INR 1 20    APTT [[id number]]  (Normal) Collected:  12/29/19 0849    Lab Status:  Final result Specimen:  Blood from Arm, Right Updated:  12/29/19 0910     PTT 33 seconds     CBC and differential [[id number]]  (Abnormal) Collected:  12/29/19 0849    Lab Status:  Final result Specimen:  Blood from Arm, Right Updated:  12/29/19 0900     WBC 9 65 Thousand/uL      RBC 4 09 Million/uL      Hemoglobin 11 3 g/dL      Hematocrit 37 8 %      MCV 92 fL      MCH 27 6 pg      MCHC 29 9 g/dL      RDW 16 2 %      MPV 11 0 fL      Platelets 005 Thousands/uL      nRBC 0 /100 WBCs      Neutrophils Relative 89 %      Immat GRANS % 0 %      Lymphocytes Relative 5 %      Monocytes Relative 6 %      Eosinophils Relative 0 %      Basophils Relative 0 %      Neutrophils Absolute 8 48 Thousands/µL      Immature Grans Absolute 0 04 Thousand/uL      Lymphocytes Absolute 0 47 Thousands/µL      Monocytes Absolute 0 61 Thousand/µL      Eosinophils Absolute 0 02 Thousand/µL      Basophils Absolute 0 03 Thousands/µL                  XR chest 2 views   ED Interpretation by Miya Hurt PA-C (12/29 9795)   Fluid overload, dialysis catheter, cardiomegaly  Procedures  ECG 12 Lead Documentation Only  Date/Time: 12/29/2019 8:47 AM  Performed by: Miya Hurt PA-C  Authorized by: Miya Hurt PA-C     Indications / Diagnosis:  Dyspnea  ECG reviewed by me, the ED Provider: yes    Patient location:  ED  Previous ECG:     Previous ECG:  Compared to current    Comparison ECG info:  April 25, 2019    Similarity:  Changes noted    Comparison to cardiac monitor: Yes    Interpretation:     Interpretation: abnormal    Rate:     ECG rate:  71    ECG rate assessment: normal    Rhythm:     Rhythm: sinus rhythm    Ectopy:     Ectopy comment:  Premature supraventricular complexes  QRS:     QRS axis:  Left    QRS intervals:  Normal  Conduction:     Conduction: normal    ST segments:     ST segments:  Abnormal    Depression:  II, III and aVF  T waves:     T waves: normal    Comments:      New ST and depression/flattening in the inferior leads  New when compared to EKG from April 2019  Prolongation of the QT interval since previous EKG in April 2019  Independently interpreted by me             ED Course  ED Course as of Dec 29 1607   Sun Dec 29, 2019   3075 Prolongation of the QT interval that is increased since the previous EKG from April 2019  Plan 1 g of magnesium                       Initial Sepsis Screening     Row Name 12/29/19 0954                Is the patient's history suggestive of a new or worsening infection? (!) Yes (Proceed)  -JG        Suspected source of infection  pneumonia  -JG        Are two or more of the following signs & symptoms of infection both present and new to the patient?   No  -JG        Indicate SIRS criteria  Tachypnea > 20 resp per min  -JG        If the answer is yes to both questions, suspicion of sepsis is present          If severe sepsis is present AND tissue hypoperfusion perists in the hour after fluid resuscitation or lactate > 4, the patient meets criteria for SEPTIC SHOCK          Are any of the following organ dysfunction criteria present within 6 hours of suspected infection and SIRS criteria that are NOT considered to be chronic conditions? No  -JG        Organ dysfunction          Date of presentation of severe sepsis          Time of presentation of severe sepsis          Tissue hypoperfusion persists in the hour after crystalloid fluid administration, evidenced, by either:          Was hypotension present within one hour of the conclusion of crystalloid fluid administration?         Date of presentation of septic shock          Time of presentation of septic shock            User Key  (r) = Recorded By, (t) = Taken By, (c) = Cosigned By    [address] Name Provider Type    [address] PA-C Physician Assistant                  MDM  Number of Diagnoses or Management Options  Dyspnea: established and worsening  End stage renal disease on dialysis [facility]): established and worsening  Hypertension: established and worsening  Influenza A: new and requires workup  Non-STEMI (non-ST elevated myocardial infarction) [facility]): new and requires workup  QT prolongation: new and requires workup     Amount and/or Complexity of Data Reviewed  Clinical lab tests: ordered and reviewed  Tests in the radiology section of CPT®: ordered and reviewed  Tests in the medicine section of CPT®: ordered and reviewed    Risk of Complications, Morbidity, and/or Mortality  Presenting problems: high  Diagnostic procedures: high  Management options: high  General comments: Patient presents emergency room from dialysis with complaints of shortness of breath  She was seen in the emergency room yesterday for nasal congestion and shortness of breath  A workup was performed and she was discharged home  Dialysis nurse today noticed increased dyspnea and sent the patient to the emergency room    The patient is essentially a poor historian  She was to gapping on exam   An EKG demonstrated some new ST depression in the inferior leads  Her troponin was 0 21  The remainder of her laboratory tests were reviewed  She was influenza a positive  Her chest x-ray showed increased cephalization secondary to overload due to lack of dialysis  She was started on heparin drip  She was admitted to being seen for dialysis and further evaluation  She was placed on telemetry  Patient Progress  Patient progress: stable        Disposition  Final diagnoses:   Dyspnea - Angina equivalent dyspnea   Non-STEMI (non-ST elevated myocardial infarction) ([facility] )   Influenza A   End stage renal disease on dialysis [facility])   Hypertension   QT prolongation     Time reflects when diagnosis was documented in both MDM as applicable and the Disposition within this note     Time User Action Codes Description Comment    12/29/2019  9:49 AM Rosaleen Soho Add [R06 00] Dyspnea     12/29/2019  9:50 AM Rosaleen Soho Modify [R06 00] Dyspnea Angina equivalent dyspnea    12/29/2019  9:50 AM Rosaleen Soho Add [I21 4] Non-STEMI (non-ST elevated myocardial infarction) ([facility] )     12/29/2019  9:50 AM Rosaleen Soho Add [J10 1] Influenza A     12/29/2019  9:50 AM Rosaleen Soho Add [N18 6,  Z99 2] End stage renal disease on dialysis ([place] [id number] )     12/29/2019  9:50 AM Rosaleen Soho Add [I10] Hypertension     12/29/2019  9:51 AM Rosaleen Soho Add [R94 31] QT prolongation     12/29/2019  1:56 PM Sam Dale Modify [J10 1] Influenza A       ED Disposition     ED Disposition Condition Date/Time Comment    Admit Stable Sun Dec 29, 2019 10:47 AM Case was discussed with Dr Sundeep Levy and the patient's admission status was agreed to be Admission Status: inpatient status to the service of Dr Sundeep Levy   Follow-up Information    None         Patient's Medications   Discharge Prescriptions    No medications on file     No discharge procedures on file      ED Provider  Electronically Signed by           Lux Barragan PA-C  12/29/19 9547

## 2019-12-30 ENCOUNTER — APPOINTMENT (INPATIENT)
Dept: NON INVASIVE DIAGNOSTICS | Facility: HOSPITAL | Age: 84
DRG: 193 | End: 2019-12-30
Payer: MEDICARE

## 2019-12-30 ENCOUNTER — APPOINTMENT (INPATIENT)
Dept: DIALYSIS | Facility: HOSPITAL | Age: 84
DRG: 193 | End: 2019-12-30
Payer: MEDICARE

## 2019-12-30 PROBLEM — R13.12 OROPHARYNGEAL DYSPHAGIA: Status: ACTIVE | Noted: 2019-12-30

## 2019-12-30 LAB
ANION GAP SERPL CALCULATED.3IONS-SCNC: 14 MMOL/L (ref 4–13)
APTT PPP: 63 SECONDS (ref 23–37)
APTT PPP: 80 SECONDS (ref 23–37)
BUN SERPL-MCNC: 48 MG/DL (ref 5–25)
CALCIUM SERPL-MCNC: 7.7 MG/DL (ref 8.3–10.1)
CHLORIDE SERPL-SCNC: 99 MMOL/L (ref 100–108)
CHOLEST SERPL-MCNC: 100 MG/DL (ref 50–200)
CO2 SERPL-SCNC: 25 MMOL/L (ref 21–32)
CREAT SERPL-MCNC: 5.06 MG/DL (ref 0.6–1.3)
ERYTHROCYTE [DISTWIDTH] IN BLOOD BY AUTOMATED COUNT: 16 % (ref 11.6–15.1)
GFR SERPL CREATININE-BSD FRML MDRD: 8 ML/MIN/1.73SQ M
GLUCOSE SERPL-MCNC: 119 MG/DL (ref 65–140)
HCT VFR BLD AUTO: 33.8 % (ref 34.8–46.1)
HDLC SERPL-MCNC: 44 MG/DL
HGB BLD-MCNC: 10.6 G/DL (ref 11.5–15.4)
LDLC SERPL CALC-MCNC: 46 MG/DL (ref 0–100)
MCH RBC QN AUTO: 28.1 PG (ref 26.8–34.3)
MCHC RBC AUTO-ENTMCNC: 31.4 G/DL (ref 31.4–37.4)
MCV RBC AUTO: 90 FL (ref 82–98)
NONHDLC SERPL-MCNC: 56 MG/DL
PLATELET # BLD AUTO: 133 THOUSANDS/UL (ref 149–390)
PMV BLD AUTO: 10.9 FL (ref 8.9–12.7)
POTASSIUM SERPL-SCNC: 3.7 MMOL/L (ref 3.5–5.3)
RBC # BLD AUTO: 3.77 MILLION/UL (ref 3.81–5.12)
SODIUM SERPL-SCNC: 138 MMOL/L (ref 136–145)
TRIGL SERPL-MCNC: 49 MG/DL
TROPONIN I SERPL-MCNC: 1.39 NG/ML
WBC # BLD AUTO: 4.37 THOUSAND/UL (ref 4.31–10.16)

## 2019-12-30 PROCEDURE — 92610 EVALUATE SWALLOWING FUNCTION: CPT

## 2019-12-30 PROCEDURE — 80061 LIPID PANEL: CPT | Performed by: INTERNAL MEDICINE

## 2019-12-30 PROCEDURE — 93306 TTE W/DOPPLER COMPLETE: CPT

## 2019-12-30 PROCEDURE — 99232 SBSQ HOSP IP/OBS MODERATE 35: CPT | Performed by: NURSE PRACTITIONER

## 2019-12-30 PROCEDURE — 93306 TTE W/DOPPLER COMPLETE: CPT | Performed by: INTERNAL MEDICINE

## 2019-12-30 PROCEDURE — 85027 COMPLETE CBC AUTOMATED: CPT | Performed by: INTERNAL MEDICINE

## 2019-12-30 PROCEDURE — 90935 HEMODIALYSIS ONE EVALUATION: CPT | Performed by: INTERNAL MEDICINE

## 2019-12-30 PROCEDURE — 85730 THROMBOPLASTIN TIME PARTIAL: CPT | Performed by: INTERNAL MEDICINE

## 2019-12-30 PROCEDURE — 80048 BASIC METABOLIC PNL TOTAL CA: CPT | Performed by: INTERNAL MEDICINE

## 2019-12-30 PROCEDURE — 99223 1ST HOSP IP/OBS HIGH 75: CPT | Performed by: INTERNAL MEDICINE

## 2019-12-30 PROCEDURE — 84484 ASSAY OF TROPONIN QUANT: CPT | Performed by: INTERNAL MEDICINE

## 2019-12-30 PROCEDURE — G8996 SWALLOW CURRENT STATUS: HCPCS

## 2019-12-30 PROCEDURE — 99222 1ST HOSP IP/OBS MODERATE 55: CPT | Performed by: INTERNAL MEDICINE

## 2019-12-30 PROCEDURE — 5A1D70Z PERFORMANCE OF URINARY FILTRATION, INTERMITTENT, LESS THAN 6 HOURS PER DAY: ICD-10-PCS | Performed by: INTERNAL MEDICINE

## 2019-12-30 PROCEDURE — G8997 SWALLOW GOAL STATUS: HCPCS

## 2019-12-30 RX ORDER — OSELTAMIVIR PHOSPHATE 30 MG/1
30 CAPSULE ORAL
Status: COMPLETED | OUTPATIENT
Start: 2019-12-30 | End: 2020-01-03

## 2019-12-30 RX ORDER — DOXERCALCIFEROL 2 UG/ML
1 INJECTION, SOLUTION INTRAVENOUS
Status: DISCONTINUED | OUTPATIENT
Start: 2019-12-30 | End: 2020-01-08 | Stop reason: HOSPADM

## 2019-12-30 RX ORDER — AMLODIPINE BESYLATE 10 MG/1
10 TABLET ORAL DAILY
Status: DISCONTINUED | OUTPATIENT
Start: 2019-12-31 | End: 2020-01-08 | Stop reason: HOSPADM

## 2019-12-30 RX ORDER — CEFAZOLIN SODIUM 1 G/50ML
1000 SOLUTION INTRAVENOUS EVERY 24 HOURS
Status: DISCONTINUED | OUTPATIENT
Start: 2019-12-30 | End: 2019-12-31

## 2019-12-30 RX ADMIN — BISACODYL 5 MG: 5 TABLET, COATED ORAL at 11:53

## 2019-12-30 RX ADMIN — KETOROLAC TROMETHAMINE 1 DROP: 5 SOLUTION OPHTHALMIC at 11:54

## 2019-12-30 RX ADMIN — CEFAZOLIN SODIUM 1000 MG: 1 SOLUTION INTRAVENOUS at 19:40

## 2019-12-30 RX ADMIN — SEVELAMER HYDROCHLORIDE 800 MG: 800 TABLET, FILM COATED PARENTERAL at 19:43

## 2019-12-30 RX ADMIN — DOCUSATE SODIUM 100 MG: 100 CAPSULE, LIQUID FILLED ORAL at 08:11

## 2019-12-30 RX ADMIN — DOXERCALCIFEROL 1 MCG: 4 INJECTION, SOLUTION INTRAVENOUS at 09:26

## 2019-12-30 RX ADMIN — HEPARIN SODIUM AND DEXTROSE 13.1 UNITS/KG/HR: 10000; 5 INJECTION INTRAVENOUS at 10:11

## 2019-12-30 RX ADMIN — KETOROLAC TROMETHAMINE 1 DROP: 5 SOLUTION OPHTHALMIC at 20:59

## 2019-12-30 RX ADMIN — VANCOMYCIN HYDROCHLORIDE 1250 MG: 5 INJECTION, POWDER, LYOPHILIZED, FOR SOLUTION INTRAVENOUS at 20:59

## 2019-12-30 RX ADMIN — SEVELAMER HYDROCHLORIDE 800 MG: 800 TABLET, FILM COATED PARENTERAL at 11:53

## 2019-12-30 RX ADMIN — METOPROLOL TARTRATE 25 MG: 25 TABLET, FILM COATED ORAL at 22:29

## 2019-12-30 RX ADMIN — ACETAMINOPHEN 650 MG: 325 TABLET, FILM COATED ORAL at 10:11

## 2019-12-30 NOTE — NURSING NOTE
Pt is able to follow commands, will squeeze hands, stick out tongue, ect  Gave pt sip of water and she coughed it back up  Pt states she is unable to tolerate PO intake because she feels like she is choking  Informed SLIM, who ordered speech eval and NPO in the mean time

## 2019-12-30 NOTE — SOCIAL WORK
LOS 1  GLOS not indicated  Pt is currently not a bundle  Pt is a 30 day readmission  Pt was just at [facility] due to malfunctioning dialysis catheter and was exchanged  Pt admitted this time due to being positive for the flu  Unrelated readmission  Pt is a resident and a bedhold at [address] [place] [address]  Prior to admission, pt iswheelchair bound and a mehnaz lift for transfers  Pt goes to HD TTS at [facility]  Pt to return back to [address] [place] [address] when medically cleared  Referral placed  CM reviewed discharge planning process including the following: identifying caregivers at home, preference for d/c planning needs, availability of treatment team to discuss questions or concerns patient and/or family may have regarding diagnosis, plan of care, old or new medications and discharge planning   CM will continue to follow for care coordination and update assessment as appropriate

## 2019-12-30 NOTE — PLAN OF CARE
Rec dysphagia level 2 w/ NTL  Meds whole in puree  Assist w/ feeding, aspiration precautions (small bites/sips, slow rate, fully upright, only feed when fully awake/alert)   Will cont to follow

## 2019-12-30 NOTE — ASSESSMENT & PLAN NOTE
· Coughing noted and patient felt choking with oral intake  · VBS during previous hospitalization recommended modified diet  · SLP evaluated and recommend Dysphagia level 2 with nectar thickened liquids  · Aspiration precautions  · Continue to monitor

## 2019-12-30 NOTE — ASSESSMENT & PLAN NOTE
· Nephrology will be following the patient  Patient did miss dialysis and was dialyzed yesterday and today  · Avoid giving IV fluids in less absolutely necessary in the setting of end-stage renal disease  · Continue medications

## 2019-12-30 NOTE — ASSESSMENT & PLAN NOTE
· Arrived with shortness of breath from dialysis center  · Influenza A (+), CXR negative  · Tamiflu x 5 days  · Mucinex BID, Tessalon Perles  · Monitor temperatures - Tylenol as needed  · P r n  Robitussin DM  · Xopenex nebulizers t i d  for wheezing  · Supportive care  · Oxygen as needed, currently on 2 lpm however sats at 99%, wean   · Adjust treatment as needed

## 2019-12-30 NOTE — PLAN OF CARE
Problem: Potential for Falls  Goal: Patient will remain free of falls  Description  INTERVENTIONS:  - Assess patient frequently for physical needs  -  Identify cognitive and physical deficits and behaviors that affect risk of falls    -  [place] fall precautions as indicated by assessment   - Educate patient/family on patient safety including physical limitations  - Instruct patient to call for assistance with activity based on assessment  - Modify environment to reduce risk of injury  - Consider OT/PT consult to assist with strengthening/mobility  Outcome: Progressing     Problem: Prexisting or High Potential for Compromised Skin Integrity  Goal: Skin integrity is maintained or improved  Description  INTERVENTIONS:  - Identify patients at risk for skin breakdown  - Assess and monitor skin integrity  - Assess and monitor nutrition and hydration status  - Monitor labs   - Assess for incontinence   - Turn and reposition patient  - Assist with mobility/ambulation  - Relieve pressure over bony prominences  - Avoid friction and shearing  - Provide appropriate hygiene as needed including keeping skin clean and dry  - Evaluate need for skin moisturizer/barrier cream  - Collaborate with interdisciplinary team   - Patient/family teaching  - Consider wound care consult   Outcome: Progressing     Problem: METABOLIC, FLUID AND ELECTROLYTES - ADULT  Goal: Electrolytes maintained within normal limits  Description  INTERVENTIONS:  - Monitor labs and assess patient for signs and symptoms of electrolyte imbalances  - Administer electrolyte replacement as ordered  - Monitor response to electrolyte replacements, including repeat lab results as appropriate  - Instruct patient on fluid and nutrition as appropriate  Outcome: Progressing  Goal: Fluid balance maintained  Description  INTERVENTIONS:  - Monitor labs   - Monitor I/O and WT  - Instruct patient on fluid and nutrition as appropriate  - Assess for signs & symptoms of volume excess or deficit  Outcome: Progressing     Problem: Nutrition/Hydration-ADULT  Goal: Nutrient/Hydration intake appropriate for improving, restoring or maintaining nutritional needs  Description  Monitor and assess patient's nutrition/hydration status for malnutrition  Collaborate with interdisciplinary team and initiate plan and interventions as ordered  Monitor patient's weight and dietary intake as ordered or per policy  Utilize nutrition screening tool and intervene as necessary  Determine patient's food preferences and provide high-protein, high-caloric foods as appropriate       INTERVENTIONS:  - Monitor oral intake, urinary output, labs, and treatment plans  - Assess nutrition and hydration status and recommend course of action  - Evaluate amount of meals eaten  - Assist patient with eating if necessary   - Allow adequate time for meals  - Recommend/ encourage appropriate diets, oral nutritional supplements, and vitamin/mineral supplements  - Order, calculate, and assess calorie counts as needed  - Recommend, monitor, and adjust tube feedings and TPN/PPN based on assessed needs  - Assess need for intravenous fluids  - Provide specific nutrition/hydration education as appropriate  - Include patient/family/caregiver in decisions related to nutrition  Outcome: Progressing

## 2019-12-30 NOTE — PROGRESS NOTES
Vancomycin Assessment    Cassidy Dose is a 81 y o  female who is currently receiving vancomycin IV for bacteremia  Relevant clinical data and objective history reviewed:  Creatinine   Date Value Ref Range Status   12/30/2019 5 06 (H) 0 60 - 1 30 mg/dL Final     Comment:     Standardized to IDMS reference method   12/29/2019 7 25 (H) 0 60 - 1 30 mg/dL Final     Comment:     Standardized to IDMS reference method   12/28/2019 5 88 (H) 0 60 - 1 30 mg/dL Final     Comment:     Standardized to IDMS reference method     /50   Pulse 72   Temp 98 4 °F (36 9 °C) (Oral)   Resp 18   Wt 88 kg (194 lb 0 1 oz)   SpO2 100%   BMI 34 37 kg/m²   I/O last 3 completed shifts: In: 687 5 [I V :537 5; Other:100; IV Piggyback:50]  Out: 2586 [Other:2586]  Lab Results   Component Value Date/Time    BUN 48 (H) 12/30/2019 08:37 AM    WBC 4 37 12/30/2019 08:37 AM    HGB 10 6 (L) 12/30/2019 08:37 AM    HCT 33 8 (L) 12/30/2019 08:37 AM    MCV 90 12/30/2019 08:37 AM     (L) 12/30/2019 08:37 AM     Temp Readings from Last 3 Encounters:   12/30/19 98 4 °F (36 9 °C) (Oral)   12/28/19 98 5 °F (36 9 °C) (Oral)   12/27/19 97 8 °F (36 6 °C) (Oral)     Vancomycin Days of Therapy: 1    Assessment/Plan  The patient is currently on vancomycin utilizing scheduled dosing based on adjusted body weight (due to obesity)  Baseline risks associated with therapy include: pre-existing renal impairment and advanced age  The patient will receive a loading dose of 1250mg IV once now and after clinical evaluation will be started on scheduled dosing of 750mg IV after each dialysis treatment  Pharmacy will also follow closely for s/sx of nephrotoxicity, infusion reactions and appropriateness of therapy  BMP and CBC will be ordered per protocol  Plan for random level prior to dialysis on Thursday, 1/2  Due to infection severity, will target a level of 20-25    Pharmacy will continue to follow the patients culture results and clinical progress daily      Marifer Reyes, Pharmacist

## 2019-12-30 NOTE — ASSESSMENT & PLAN NOTE
· Anticoagulation - heparin drip for now  · Anti-platelet -  Aspirin  · Beta-blocker - continue labetalol  · Statin - continue atorvastatin  · Nitrate - p r n  Nitroglycerin  · Evaluation -   · Cardiology consultation appreciated  Will perform echo and if normal, will discontinue Heparin gtt  · Trops elevated with max 2 19, EKG without ischemic change  · Monitor for chest pain symptoms  Currently patient denies any chest pain symptoms

## 2019-12-30 NOTE — HEMODIALYSIS
Patient completed 3 5 hour HD treatment using LIJ tunneled catheter without difficulty  BFR to 400 as prescribed  Patient's BP trended down during treatment  UF goal decreased and BP remained stable  1 5L of fluid removed  No other issues during treatment  Patient denied dizziness, cramping, and SOB  Pre-weight:  88 kg  Post-weight:  86 5 kg  EDW: 87 kg    EDW was challenged  Patient is now 0 5 kg under dry weight  Dr Gino Pradhan aware

## 2019-12-30 NOTE — CONSULTS
Consultation - Cardiology Team One  Maicol Logan 81 y o  female MRN: [id number]  Unit/Bed#: S -01 Encounter: [id number]    Inpatient consult to Cardiology  Consult performed by: TIANA Stephenson  Consult ordered by: Tomeka King DO          Physician Requesting Consult: Tomeka King DO     Reason for Consult / Principal Problem: elevated troponin      Assessment/ Plan    1  Elevated troponin: 2 19, 1 59, 1 7  Likely non-MI troponin elevation 2/2 hypoxia/influenza  ESRD  No chest pain  EKG without any significant changes to suggest acute ischemia  Will obtain echo; if EF wnl and no wall motion abnormalities would stop IV heparin  Continue ASA, statin and beta blocker     2  Influenza A: on Tamiflu and supportive measures per primary team      3  Hypoxia: likely 2/2 #2; continue supportive measures    4  ESRD on Hemodialysis: f/b nephrology; she is mildly volume overloaded on exam with LE edema; they are planning to remove volume with HD; it appears she missed a treatment last week  5  HTN: continue home meds; Norvasc 10mg daily and labetalol 100mg BID with hold parameters    6  HLD: on atorvastatin 40mg daily; repeat lipid panel in AM     History of Present Illness      HPI: Maicol Logan is a 81y o  year old female who has ESRD on HD (T, Th, Sat), diabetes, HTN, dyslipidemia, chronic pain/chronic opioid use, depression, aphasia/prior CVA  She does not follow with a cardiologist         Pt presented to ED on 12/28; was sent from [facility] with reported hypoxia and coughing  On presentation she was afebrile, SPO2 97% on supplemental O2, mildly hypertensive with /60  Cxray NAD  EKG showed sinus rhythm with PACs and non-specific st/twave abnormalities; no significant changes compared to ekg 4/2019  Labs: Initial troponin 2 19  Cr 5 88, K 4 1, Mag 2 5  Hgb 10 6, WBC 4  Pt was admitted for further workup up of hypoxia and elevated troponin  Found to be influenza A positive  Subsequent troponin 1 59, 1 7    At time of my exam patient reports feeling unwell; she has a cough but denies any SOB or chest pain  She has aphasia and communication is limited but a close family friend was present and able to provide additional details  No known hx of CAD, MI or CHF  No prior cardiac testing  She lives at a skilled nursing facility  Gets HD T, Thursday, sat via permacath  Low functional status  EKG reviewed personally:  12/29/2019  Sinus rhythm with PACs  Non-specific St/twave abnormalities; no significant changes compared to ekg 4/2019    Telemetry reviewed personally:  Sinus rhythm, no significant events  Review of Systems   Constitution: Positive for malaise/fatigue  Negative for fever  Cardiovascular: Positive for leg swelling  Negative for chest pain  Respiratory: Positive for cough and sputum production  Negative for sleep disturbances due to breathing  Gastrointestinal: Negative for nausea and vomiting  Psychiatric/Behavioral: Negative for altered mental status  All other systems reviewed and are negative       Historical Information   Past Medical History:   Diagnosis Date    Altered mental status, unspecified     Anemia     Atherosclerotic heart disease of native coronary artery without angina pectoris     Cancer (HCC)     Chest pain     Chronic diastolic (congestive) heart failure (HCC)     D[facility] [address] [facility]     [facility]     End-stage renal disease (HCC)     Gastro-esophageal reflux disease without esophagitis     Hyperlipidemia     Hypertension     Long term current use of anticoagulant     Long term current use of insulin (HCC)     Muscle weakness     Nausea with vomiting     Other abnormalities of gait and mobility     Other specified abnormal findings of blood chemistry     Type 2 diabetes mellitus (HCC)     Ventral hernia without obstruction or gangrene     Vitamin D deficiency      Past Surgical History: Procedure Laterality Date    AV FISTULA PLACEMENT      MALIA HAYES [facility] EXCHANGE  12/27/2019    MASTECTOMY      R arm     Social History     Substance and Sexual Activity   Alcohol Use No     Social History     Substance and Sexual Activity   Drug Use No     Social History     Tobacco Use   Smoking Status Never Smoker   Smokeless Tobacco Never Used     Family History: History reviewed  No pertinent family history      Meds/Allergies   current meds:   Current Facility-Administered Medications   Medication Dose Route Frequency    acetaminophen (TYLENOL) tablet 650 mg  650 mg Oral Q6H PRN    amLODIPine (NORVASC) tablet 10 mg  10 mg Oral Daily    aspirin chewable tablet 81 mg  81 mg Oral Daily    atorvastatin (LIPITOR) tablet 40 mg  40 mg Oral QPM    bisacodyl (DULCOLAX) EC tablet 5 mg  5 mg Oral Daily PRN    busPIRone (BUSPAR) tablet 5 mg  5 mg Oral TID    docusate sodium (COLACE) capsule 100 mg  100 mg Oral BID    doxercalciferol (HECTOROL) injection 1 mcg  1 mcg Intravenous After Dialysis    fentaNYL (DURAGESIC) 25 mcg/hr TD 72 hr patch 1 patch  1 patch Transdermal Q72H    heparin (porcine) 25,000 units in 250 mL infusion (premix)  3-20 Units/kg/hr (Order-Specific) Intravenous Titrated    heparin (porcine) injection 2,000 Units  2,000 Units Intravenous PRN    heparin (porcine) injection 4,000 Units  4,000 Units Intravenous PRN    ketorolac (ACULAR) 0 5 % ophthalmic solution 1 drop  1 drop Both Eyes 4x Daily    labetalol (NORMODYNE) tablet 100 mg  100 mg Oral BID    nitroglycerin (NITROSTAT) SL tablet 0 4 mg  0 4 mg Sublingual Q5 Min PRN    oseltamivir (TAMIFLU) capsule 30 mg  30 mg Oral Once    oseltamivir (TAMIFLU) capsule 30 mg  30 mg Oral After Dialysis    polyethylene glycol (MIRALAX) packet 17 g  17 g Oral Daily    sertraline (ZOLOFT) tablet 50 mg  50 mg Oral Daily    sevelamer (RENAGEL) tablet 800 mg  800 mg Oral TID With Meals       heparin (porcine) 3-20 Units/kg/hr (Order-Specific) Last Rate: 13 1 Units/kg/hr (12/29/19 1754)     Allergies   Allergen Reactions    Percolone [Oxycodone]     Sulfa Antibiotics      Objective   Vitals: Blood pressure 100/67, pulse 76, temperature 97 9 °F (36 6 °C), temperature source Oral, resp  rate 16, weight 88 kg (194 lb 0 1 oz), SpO2 99 %  ,     Body mass index is 34 37 kg/m²  ,     Systolic (52MMQ), YFT:556 , Min:82 , QCF:850     Diastolic (07BNF), WFQ:01, Min:51, Max:81      Intake/Output Summary (Last 24 hours) at 12/30/2019 0845  Last data filed at 12/29/2019 1800  Gross per 24 hour   Intake 687 5 ml   Output 2586 ml   Net -1898 5 ml     Weight (last 2 days)     Date/Time   Weight    12/30/19 0600   88 (194 01)    12/29/19 1824   88 8 (195 77)    12/29/19 1036   94 (207 23)            Invasive Devices     Peripheral Intravenous Line            Peripheral IV 01/05/19 Right Arm 358 days    Peripheral IV 12/29/19 Right Forearm less than 1 day          Hemodialysis Catheter            HD Permanent Double Catheter 2 days              Physical Exam   Constitutional: No distress  Pt sitting up in bed in NAD  Alert and cooperative   HENT:   Head: Normocephalic and atraumatic  Cardiovascular: Normal rate, regular rhythm, S1 normal and S2 normal  Exam reveals distant heart sounds  No murmur heard  Mild +1 pitting b/l LE edema   Pulmonary/Chest: Effort normal  She has wheezes  Diffuse rhonchi and wheezing  On O2 via NC; moist cough noted   Abdominal: Soft  Musculoskeletal: She exhibits edema  She exhibits no deformity  Neurological: She is alert  Skin: Skin is warm and dry  She is not diaphoretic  Psychiatric: She has a normal mood and affect  Nursing note and vitals reviewed      LABORATORY RESULTS:  Results from last 7 days   Lab Units 12/29/19  2135 12/29/19  1852 12/29/19  0849   TROPONIN I ng/mL 1 70* 1 59* 2 19*     CBC with diff: Results from last 7 days   Lab Units 12/29/19  0849 12/28/19  0839 12/26/19  1619   WBC Thousand/uL 9 65 7 36 6 76 HEMOGLOBIN g/dL 11 3* 10 2* 10 3*   HEMATOCRIT % 37 8 34 2* 34 5*   MCV fL 92 92 92   PLATELETS Thousands/uL 147* 137* 146*  154   MCH pg 27 6 27 4 27 5   MCHC g/dL 29 9* 29 8* 29 9*   RDW % 16 2* 16 2* 16 2*   MPV fL 11 0 10 9 10 2  11 1   NRBC AUTO /100 WBCs 0 0  --      CMP:  Results from last 7 days   Lab Units 12/29/19  0849 12/28/19  0839 12/27/19  0431 12/26/19  1031   POTASSIUM mmol/L 4 6 4 1 4 8 4 1   CHLORIDE mmol/L 101 101 107 107   CO2 mmol/L 22 24 24 19*   BUN mg/dL 68* 49* 66* 57*   CREATININE mg/dL 7 25* 5 88* 7 59* 6 28*   CALCIUM mg/dL 8 3 8 2* 8 7 8 0*   AST U/L 58*  --   --   --    ALT U/L 57  --   --   --    ALK PHOS U/L 103  --   --   --    EGFR ml/min/1 73sq m 5 7 5 7     BMP:  Results from last 7 days   Lab Units 12/29/19  0849 12/28/19  0839 12/27/19  0431 12/26/19  1031   POTASSIUM mmol/L 4 6 4 1 4 8 4 1   CHLORIDE mmol/L 101 101 107 107   CO2 mmol/L 22 24 24 19*   BUN mg/dL 68* 49* 66* 57*   CREATININE mg/dL 7 25* 5 88* 7 59* 6 28*   CALCIUM mg/dL 8 3 8 2* 8 7 8 0*      Results from last 7 days   Lab Units 12/29/19  0849   MAGNESIUM mg/dL 2 5     Results from last 7 days   Lab Units 12/29/19  0849 12/26/19  1619   INR  1 20* 1 20*     Lipid Profile:   No results found for: CHOL  Lab Results   Component Value Date    HDL 36 (L) 01/06/2019     Lab Results   Component Value Date    LDLCALC 118 (H) 01/06/2019     Lab Results   Component Value Date    TRIG 69 01/06/2019     Imaging: I have personally reviewed pertinent reports  Xr Chest 1 View Portable    Result Date: 12/29/2019  Narrative: CHEST INDICATION:   AMS, hypoxia  COMPARISON:  Chest x-ray from 12/26/2019  EXAM PERFORMED/VIEWS:  XR CHEST PORTABLE FINDINGS:  Unchanged left IJ central line  Heart shadow is enlarged but unchanged from prior exam  The lungs are clear  No pneumothorax or pleural effusion  Osseous structures appear within normal limits for patient age  Impression: No acute cardiopulmonary disease   Workstation performed: [id number]     Xr Chest 2 Views    Result Date: 12/30/2019  Narrative: CHEST INDICATION:   dyspnea  COMPARISON:  12/28/2019 EXAM PERFORMED/VIEWS:  XR CHEST PA & LATERAL Images: 2 FINDINGS: Cardiomediastinal silhouette appears enlarged  A permacath is unchanged entering on the left  The lungs are clear  No pneumothorax or pleural effusion  Osseous structures appear within normal limits for patient age  Impression: No acute cardiopulmonary disease  Workstation performed: [id number]     Xr Chest Pa & Lateral    Result Date: 12/26/2019  Narrative: CHEST INDICATION:   cath place  COMPARISON:  April 25, 2019 EXAM PERFORMED/VIEWS:  XR CHEST PA & LATERAL FINDINGS:  Large bore left IJ catheter in place  Cardiac silhouette top normal in size  Atherosclerotic calcification of the thoracic arch  The lungs are clear  No pneumothorax or pleural effusion  Osseous structures appear within normal limits for patient age  Impression: Borderline cardiomegaly  Mild pulmonary vascular congestion  Left IJ catheter in place  No pneumothorax identified  Workstation performed: [id number]     Ct Head Without Contrast    Result Date: 12/28/2019  Narrative: CT BRAIN - WITHOUT CONTRAST INDICATION:   ams, weakness  COMPARISON:  CT stroke alert May 24, 2013 TECHNIQUE:  CT examination of the brain was performed  In addition to axial images, coronal 2D reformatted images were created and submitted for interpretation  Radiation dose length product (DLP) for this visit:  944 mGy-cm   This examination, like all CT scans performed in the [facility], was performed utilizing techniques to minimize radiation dose exposure, including the use of iterative reconstruction and automated exposure control  IMAGE QUALITY:  Diagnostic  FINDINGS: PARENCHYMA:  No acute intraparenchymal hemorrhage or extra-axial fluid collections  No acute infarctions  Encephalomalacia and gliosis left frontal lobe    Decreased attenuation in the periventricular regions and centrum semiovale bilaterally, consistent with chronic small vessel ischemic white matter disease  Bilateral internal carotid artery and vertebral artery calcifications  VENTRICLES AND EXTRA-AXIAL SPACES:  Enlargement of ventricles and extra-axial CSF spaces, out of proportion to the patient's age most consistent with cerebral and cerebellar atrophy  VISUALIZED ORBITS AND PARANASAL SINUSES:  Globes are symmetric and intact  Prior right-sided lens surgery  Visualized paranasal sinuses and mastoid air cells are clear  CALVARIUM AND EXTRACRANIAL SOFT TISSUES:  Normal      Impression: Stable cerebral atrophy with chronic small vessel ischemic white matter disease and chronic infarction  No acute intracranial abnormality  If there is continued concern for acute ischemic event, a follow-up MRI of the brain with diffusion-weighted imaging should be obtained  Workstation performed: [id number]     Ir Permacath Exchange    Result Date: 12/27/2019  Narrative: Tunneled Dialysis Catheter exchange 12/27/2019 History: Poorly functioning tunnel dialysis catheter Medications: The patient received conscious sedation  The patient's heart rate, blood pressure, and oxygen saturation were measured continuously throughout the procedure  Contrast: Contrast for balloon only Fluoroscopy time: 1 6 minutes Images: 4 Conscious sedation Time: 53 minutes Procedure: The patient was identified verbally and by wristband  Timeout was performed  All elements of maximal sterile barrier technique were followed (cap, mask, sterile gown, sterile gloves, large sterile sheet, hand hygiene and 2% chlorhexidine for cutaneous antisepsis)  The patient was prepped and draped in usual sterile fashion  A stiff angled glide wire was advanced through the previously placed left-sided tunneled dialysis catheter to the level of the inferior vena cava using fluoroscopic guidance    One percent lidocaine was used to anesthetize the region of the exit site of the previously placed tunnel dialysis catheter  Using traction and blunt dissection, the catheter was removed over the wire  An 8 x 40 mm balloon was advanced over the wire and inflated to disrupt any potential fibrin sheath  A new 28 cm tunneled dialysis catheter was advanced over the wire using fluoroscopic guidance  The lumens flushed with ease  The catheter was secured in place  The patient tolerated the procedure well without apparent immediate complication  The patient left the IR department in unchanged condition  Dr Baron Carballo performed and directly supervised the entire procedure  Findings: A fluoroscopic spot image demonstrates a newly placed tunneled dialysis catheter with its most central aspect located within the right atrium  The catheter tubing is smooth in contour  Impression: Impression: Successful exchange of tunneled dialysis catheter via the left internal jugular vein  Workstation performed: [id number]     Assessment  Principal Problem:    Influenza A  Active Problems:    ESRD on hemodialysis [facility])    Essential hypertension    Chronic Aphasia / Cognitive impairment    Continuous opioid dependence (HCC)    Elevated troponin, rule out NSTEMI    Thank you for allowing us to participate in this patient's care  Counseling / Coordination of Care  Total floor / unit time spent today 45 minutes  Greater than 50% of total time was spent with the patient and / or family counseling and / or coordination of care  A description of the counseling / coordination of care: Review of history, current assessment, development of a plan  Code Status: Level 3 - DNAR and DNI    ** Please Note: Dragon 360 Dictation voice to text software may have been used in the creation of this document   **

## 2019-12-30 NOTE — PROGRESS NOTES
[facility]  Progress Note - Socorro Chairez 1935, 81 y o  female MRN: [id number]    Unit/Bed#: S -01 Encounter: [id number]    Primary Care Provider: Zeb Singh MD   Date and time admitted to hospital: 12/29/2019  7:21 AM        * Influenza A  Assessment & Plan  · Arrived with shortness of breath from dialysis center  · Influenza A (+), CXR negative  · Tamiflu x 5 days  · Mucinex BID, Tessalon Perles  · Monitor temperatures - Tylenol as needed  · P r n  Robitussin DM  · Xopenex nebulizers t i d  for wheezing  · Supportive care  · Oxygen as needed, currently on 2 lpm however sats at 99%, wean   · Adjust treatment as needed  Oropharyngeal dysphagia  Assessment & Plan  · Coughing noted and patient felt choking with oral intake  · VBS during previous hospitalization recommended modified diet  · SLP evaluated and recommend Dysphagia level 2 with nectar thickened liquids  · Aspiration precautions  · Continue to monitor    Elevated troponin, rule out NSTEMI  Assessment & Plan  · Anticoagulation - heparin drip for now  · Anti-platelet -  Aspirin  · Beta-blocker - continue labetalol  · Statin - continue atorvastatin  · Nitrate - p r n  Nitroglycerin  · Evaluation -   · Cardiology consultation appreciated  Will perform echo and if normal, will discontinue Heparin gtt  · Trops elevated with max 2 19, EKG without ischemic change  · Monitor for chest pain symptoms  Currently patient denies any chest pain symptoms  ESRD on hemodialysis [facility])  Assessment & Plan  · Nephrology will be following the patient  Patient did miss dialysis and was dialyzed yesterday and today  · Avoid giving IV fluids in less absolutely necessary in the setting of end-stage renal disease  · Continue medications  Continuous opioid dependence (HCC)  Assessment & Plan  · Continue medications  · No escalations of therapy currently      Chronic Aphasia / Cognitive impairment  Assessment & Plan  · Supportive care  · Patient is a limited historian  Essential hypertension  Assessment & Plan  · Continue medications  · Monitor blood pressures  VTE Pharmacologic Prophylaxis:   Pharmacologic: Heparin Drip  Mechanical VTE Prophylaxis in Place: Yes    Patient Centered Rounds: I have performed bedside rounds with nursing staff today  Discussions with Specialists or Other Care Team Provider:  Provider notes reviewed    Education and Discussions with Family / Patient:  Plan of care with patient    Time Spent for Care: 20 minutes  More than 50% of total time spent on counseling and coordination of care as described above  Current Length of Stay: 1 day(s)    Current Patient Status: Inpatient   Certification Statement: The patient will continue to require additional inpatient hospital stay due to Supportive care for symptoms associated with influenza A/further cardiology evaluation    Discharge Plan:  Pending clinical improvement    Code Status: Level 3 - DNAR and DNI      Subjective:   Patient is poor historian  Denies pain  Objective:     Vitals:   Temp (24hrs), Av 9 °F (36 6 °C), Min:97 3 °F (36 3 °C), Max:98 1 °F (36 7 °C)    Temp:  [97 3 °F (36 3 °C)-98 1 °F (36 7 °C)] 98 1 °F (36 7 °C)  HR:  [60-76] 66  Resp:  [15-18] 18  BP: ()/(50-87) 127/54  SpO2:  [99 %-100 %] 99 %  Body mass index is 34 37 kg/m²  Input and Output Summary (last 24 hours): Intake/Output Summary (Last 24 hours) at 2019 1254  Last data filed at 2019 1155  Gross per 24 hour   Intake 1100 ml   Output 4586 ml   Net -3486 ml       Physical Exam:     Physical Exam   Constitutional: She appears well-developed and well-nourished  No distress  HENT:   Head: Normocephalic and atraumatic  Eyes: Conjunctivae are normal  No scleral icterus  Cardiovascular: Normal rate, regular rhythm and normal heart sounds  No murmur heard  Pulmonary/Chest: Effort normal  No respiratory distress   She has no wheezes  She has rhonchi (Scattered)  She has no rales  On supplemental oxygen   Abdominal: Soft  Bowel sounds are normal  She exhibits no distension  There is no tenderness  Genitourinary:   Genitourinary Comments: Dialysis catheter left chest wall   Musculoskeletal: Normal range of motion  She exhibits edema (Mild BLE)  She exhibits no tenderness  Neurological:   Somnolent   Skin: Skin is warm and dry  She is not diaphoretic  Psychiatric: She has a normal mood and affect  Her behavior is normal    Nursing note and vitals reviewed  Additional Data:     Labs:    Results from last 7 days   Lab Units 12/30/19  0837 12/29/19  0849   WBC Thousand/uL 4 37 9 65   HEMOGLOBIN g/dL 10 6* 11 3*   HEMATOCRIT % 33 8* 37 8   PLATELETS Thousands/uL 133* 147*   NEUTROS PCT %  --  89*   LYMPHS PCT %  --  5*   MONOS PCT %  --  6   EOS PCT %  --  0     Results from last 7 days   Lab Units 12/30/19  0837 12/29/19  0849   SODIUM mmol/L 138 139   POTASSIUM mmol/L 3 7 4 6   CHLORIDE mmol/L 99* 101   CO2 mmol/L 25 22   BUN mg/dL 48* 68*   CREATININE mg/dL 5 06* 7 25*   ANION GAP mmol/L 14* 16*   CALCIUM mg/dL 7 7* 8 3   ALBUMIN g/dL  --  3 5   TOTAL BILIRUBIN mg/dL  --  0 73   ALK PHOS U/L  --  103   ALT U/L  --  57   AST U/L  --  58*   GLUCOSE RANDOM mg/dL 119 141*     Results from last 7 days   Lab Units 12/29/19  0849   INR  1 20*             Results from last 7 days   Lab Units 12/29/19  0849   LACTIC ACID mmol/L 0 9           * I Have Reviewed All Lab Data Listed Above  * Additional Pertinent Lab Tests Reviewed: Gloria 66 Admission Reviewed    Imaging:    Imaging Reports Reviewed Today Include:  CXR 12/29  Imaging Personally Reviewed by Myself Includes:  None    Recent Cultures (last 7 days):     Results from last 7 days   Lab Units 12/29/19  0907 12/29/19  0849   BLOOD CULTURE  No Growth at 24 hrs  Received in Microbiology Lab  Culture in Progress         Last 24 Hours Medication List: Current Facility-Administered Medications:  acetaminophen 650 mg Oral Q6H PRN Ada Se, DO    [START ON 12/31/2019] amLODIPine 10 mg Oral Daily Seth Laughlin MD    aspirin 81 mg Oral Daily Ada Se, DO    atorvastatin 40 mg Oral QPM Ada Se, DO    bisacodyl 5 mg Oral Daily PRN Ada Se, DO    busPIRone 5 mg Oral TID Ada Se, DO    docusate sodium 100 mg Oral BID Ada Se, DO    doxercalciferol 1 mcg Intravenous After Dialysis Ada Se, DO    fentaNYL 1 patch Transdermal Q72H Ada Se, DO    heparin (porcine) 3-20 Units/kg/hr (Order-Specific) Intravenous Titrated Ada Se, DO Last Rate: 13 111 Units/kg/hr (12/30/19 1011)   heparin (porcine) 2,000 Units Intravenous PRN Ada Se, DO    heparin (porcine) 4,000 Units Intravenous PRN Ada Se, DO    ketorolac 1 drop Both Eyes 4x Daily Ada Se, DO    labetalol 100 mg Oral BID Ada Se, DO    nitroglycerin 0 4 mg Sublingual Q5 Min PRN Ada Se, DO    oseltamivir 30 mg Oral Once Ada Se, DO    oseltamivir 30 mg Oral After Dialysis Seth Laughlin MD    polyethylene glycol 17 g Oral Daily Ada Se, DO    sertraline 50 mg Oral Daily Ada Se, DO    sevelamer 800 mg Oral TID With Meals Ada Se, DO         Today, Patient Was Seen By: TIANA Cardona    ** Please Note: Dictation voice to text software may have been used in the creation of this document   **

## 2019-12-30 NOTE — PLAN OF CARE
Problem: METABOLIC, FLUID AND ELECTROLYTES - ADULT  Goal: Electrolytes maintained within normal limits  Description  INTERVENTIONS:  - Monitor labs and assess patient for signs and symptoms of electrolyte imbalances  - Administer electrolyte replacement as ordered  - Monitor response to electrolyte replacements, including repeat lab results as appropriate  - Instruct patient on fluid and nutrition as appropriate  Outcome: Progressing     Problem: METABOLIC, FLUID AND ELECTROLYTES - ADULT  Goal: Fluid balance maintained  Description  INTERVENTIONS:  - Monitor labs   - Monitor I/O and WT  - Instruct patient on fluid and nutrition as appropriate  - Assess for signs & symptoms of volume excess or deficit  Outcome: Progressing     Patient receiving 3 5 hour HD treatment  Remove 1 5-2L as tolerated  Will challenge EDW per order    Plan of care discussed with patient and [place], [place]

## 2019-12-30 NOTE — CONSULTS
Consultation - Infectious Disease   Frantz Weber 81 y o  female MRN: [id number]  Unit/Bed#: S -01 Encounter: [id number]      IMPRESSION & RECOMMENDATIONS:   1  Gram-positive bacteremia  Patient's blood cultures from admission noted to be positive x2  Unclear source at this time  Concern also for contaminant as patient is a difficult stick  Only prosthetic material is PermCath  Initial chest x-ray unremarkable  Patient otherwise hemodynamically stable  Repeat set of blood cultures ordered now  Will start vancomycin and Ancef after blood cultures  Pharmacy consult for vancomycin monitoring  Continue to trend fever curve/vitals  Repeat CBC with differential tomorrow  Follow up pending culture data  Follow-up 2D echo  Repeat chest x-ray ordered for morning  Additional interventions pending clinical course  Additional care as per primary    2  Influenza A  Patient found to be influenza a positive  She remains stable from a respiratory and hemodynamic standpoint  Unfortunately this places her at increased risk for pneumonia  Continue on Tamiflu dosed after dialysis  Maintain droplet isolation  Antibiotics as above  Follow up pending culture data as above  Additional care as per primary    3  End-stage renal disease on hemodialysis  Patient remains on hemodialysis via PermCath  Unclear if there has been any issues with the catheter itself recently  No acute signs of infection on exam today  Antibiotics as above  Ongoing follow-up by Nephrology  May need to consider catheter removal pending culture data  Additional care as per primary    4  Oral pharyngeal dysphagia  Patient has been seen by speech therapy and recommended for modified diet  Additional care as above  5  Thrombocytopenia and anemia  Suspect hemoglobin is chronically low in the setting of dialysis  Patient also with chronic fluctuations and platelet count    Continue monitor CBC  Transfusional support as per primary    Above plan discussed in detail with primary service attending and with nursing  ID consult service will continue to follow closely  HISTORY OF PRESENT ILLNESS:  Reason for Consult:  Bacteremia    HPI: Saima Butler is a 81y o  year old female with extensive past medical history which includes heart failure, dialysis, hyperlipidemia, hypertension  He presented to the hospital yesterday with increasing shortness of breath  Patient found to be tachypneic at her dialysis session  Patient brought into the emergency department and was later found to have positive influenza testing  No leukocytosis on admission  CT head unremarkable  Chest x-ray without acute findings  Patient was admitted for influenza and started on Tamiflu  Nephrology was consulted and dialysis was continued  Patient was later seen by Cardiology for elevated troponin  Patient received dialysis through a tunneled IJ catheter  Patient was also found to have dysphagia on evaluation by speech therapy and was switched to modified diet  Blood cultures later returned positive in patient was ordered for vancomycin  She has received dialysis today  Per nursing antibiotics have not been given  Blood cultures so far with gram-positive cocci in clusters  Flu testing again positive  Repeat blood cultures ordered now  No new imaging overnight  Patient's other vitals are stable  Patient has not been seen by our service previously  She has no prior culture data in our system  No significant data in Care everywhere  We are consulted at this time for further assistance in management given this patient's Gram-positive bacteremia in the setting of flu  On evaluation, patient is a very pleasant 29-year-old female  She is able to tell me that she is in the hospital and that she has diabetes and normally gets dialysis  It is difficult to get any history from her otherwise    She tends to trial off and conversation and she provides very abrupt answers at times  She reports that she is feeling better since being in the hospital   She denies having any nausea or vomiting  She denies having any chest pain or shortness of breath  She reports continuing to have significant cough and sputum production  She reports that she lives alone by herself and that she does not require assistance with any walker or wheelchair  She cannot recall any issues or history with her catheter otherwise  She denies having any prosthetic material in her body other than her catheter  REVIEW OF SYSTEMS:  A complete 12 point system-based review of systems is negative other than that noted in the HPI      PAST MEDICAL HISTORY:  Past Medical History:   Diagnosis Date    Altered mental status, unspecified     Anemia     Atherosclerotic heart disease of native coronary artery without angina pectoris     Cancer (HCC)     Chest pain     Chronic diastolic (congestive) heart failure (HCC)     [facility]ependence [facility] ([facility] [address] [facility]     Diabetes mellitus (HCC)     End-stage renal disease (HCC)     Gastro-esophageal reflux disease without esophagitis     Hyperlipidemia     Hypertension     Long term current use of anticoagulant     Long term current use of insulin (HCC)     Muscle weakness     Nausea with vomiting     Other abnormalities of gait and mobility     Other specified abnormal findings of blood chemistry     Type 2 diabetes mellitus (HCC)     Ventral hernia without obstruction or gangrene     Vitamin D deficiency      Past Surgical History:   Procedure Laterality Date    AV FISTULA PLACEMENT      MALIA HAYES [facility]TL EXCHANGE  12/27/2019    MASTECTOMY      R arm       FAMILY HISTORY:  Non-contributory    SOCIAL HISTORY:  Social History   Social History     Substance and Sexual Activity   Alcohol Use No     Social History     Substance and Sexual Activity   Drug Use No     Social History     Tobacco Use   Smoking Status Never Smoker   Smokeless Tobacco Never Used ALLERGIES:  Allergies   Allergen Reactions    Percolone [Oxycodone]     Sulfa Antibiotics        MEDICATIONS:  All current active medications have been reviewed  PHYSICAL EXAM:  Temp:  [97 3 °F (36 3 °C)-98 4 °F (36 9 °C)] 98 4 °F (36 9 °C)  HR:  [60-76] 72  Resp:  [15-18] 18  BP: ()/(50-87) 133/50  SpO2:  [99 %-100 %] 100 %  Temp (24hrs), Av 9 °F (36 6 °C), Min:97 3 °F (36 3 °C), Max:98 4 °F (36 9 °C)  Current: Temperature: 98 4 °F (36 9 °C)    Intake/Output Summary (Last 24 hours) at 2019 1748  Last data filed at 2019 1155  Gross per 24 hour   Intake 800 ml   Output 4586 ml   Net -3786 ml       General Appearance:  Chronically ill-appearing elderly, nontoxic, and in no distress   Head:  Normocephalic, without obvious abnormality, atraumatic   Eyes:  Conjunctiva pink and sclera anicteric, both eyes   Nose: Nares normal, mucosa normal, no drainage   Throat: Oropharynx moist without lesions; poor dentition noted  Neck: Supple, symmetrical, no adenopathy, no tenderness/mass/nodules   Back:   Unable to fully turn patient at this time to evaluate her back   Lungs:   Patient with rhonchi and crackles bilaterally anteriorly, respirations unlabored on nasal cannula   Chest Wall:  No tenderness or deformity; catheter site noted on the patient's left chest without any signs of purulence or drainage  Heart:  RRR; no murmur, rub or gallop appreciated   Abdomen:   Soft, non-tender, non-distended, positive bowel sounds    Extremities: No cyanosis, clubbing or edema   Skin: No rashes or lesions  No draining wounds noted  Lymph nodes: Cervical, supraclavicular nodes normal   Neurologic: Difficult to obtain history but patient seems to be alert and oriented to person and to place but has issues with details related to time  She is able to move her upper extremities with some passive range of motion but does not seem to have very good movement in lower extremities         LABS, IMAGING, & OTHER STUDIES:  Lab Results:  I have personally reviewed pertinent labs  Results from last 7 days   Lab Units 12/30/19  0837 12/29/19  0849 12/28/19  0839   WBC Thousand/uL 4 37 9 65 7 36   HEMOGLOBIN g/dL 10 6* 11 3* 10 2*   PLATELETS Thousands/uL 133* 147* 137*     Results from last 7 days   Lab Units 12/30/19  0837 12/29/19  0849   POTASSIUM mmol/L 3 7 4 6   CHLORIDE mmol/L 99* 101   CO2 mmol/L 25 22   BUN mg/dL 48* 68*   CREATININE mg/dL 5 06* 7 25*   EGFR ml/min/1 73sq m 8 5   CALCIUM mg/dL 7 7* 8 3   AST U/L  --  58*   ALT U/L  --  57   ALK PHOS U/L  --  103     Results from last 7 days   Lab Units 12/29/19  0907 12/29/19  0849   GRAM STAIN RESULT  Gram positive cocci in clusters* Gram positive cocci in clusters*       Imaging Studies:   I have personally reviewed pertinent imaging study reports and images in PACS  Other Studies:   I have personally reviewed pertinent reports

## 2019-12-30 NOTE — SPEECH THERAPY NOTE
Speech Language/Pathology    Speech-Language Pathology Bedside Swallow Evaluation        Patient Name: Teresa Robbins    PPYPQ'P Date: 12/30/2019     Problem List  Principal Problem:    Influenza A  Active Problems:    ESRD on hemodialysis [facility])    Essential hypertension    Chronic Aphasia / Cognitive impairment    Continuous opioid dependence (HCC)    Elevated troponin, rule out NSTEMI         Past Medical History  Past Medical History:   Diagnosis Date    Altered mental status, unspecified     Anemia     Atherosclerotic heart disease of native coronary artery without angina pectoris     Cancer (HCC)     Chest pain     Chronic diastolic (congestive) heart failure (HCC)     [facility]ependence [facility] [address] [facility]     Diabetes mellitus ([facility] [address] [facility]     End-stage renal disease ([facility] [address] [facility]     Gastro-esophageal reflux disease without esophagitis     Hyperlipidemia     Hypertension     Long term current use of anticoagulant     Long term current use of insulin ([facility])     Muscle weakness     Nausea with vomiting     Other abnormalities of gait and mobility     Other specified abnormal findings of blood chemistry     Type 2 diabetes mellitus ([facility] [address] [facility]     Ventral hernia without obstruction or gangrene     Vitamin D deficiency        Past Surgical History  Past Surgical History:   Procedure Laterality Date    AV FISTULA PLACEMENT      IR JUSTYN HAYES [facility]TL EXCHANGE  12/27/2019    MASTECTOMY      R arm       Summary    Pt presents with mild-moderate oropharyngeal dysphagia secondary to decreased oral processing, slow mastication w/ soft solid and intermittent overt s/s aspiration w/ thin liquids  Facial grimacing w/ reported mild pain (1/10) upon swallow, though pt/friend report this is baseline        Recommendations:   Diet: mechanically altered/level 2 diet and nectar thick liquids   Meds: whole with puree   Frequent Oral care: 3x/day  Aspiration precautions and compensatory swallowing strategies: upright posture, only feed when fully alert, slow rate of feeding and small bites/sips  Other Recommendations/ considerations: ST will cont to follow to assess tolerance of diet and potential to advance to baseline diet       Current Medical Status  Pt is a 81 y o  female who presented to [facility] with influenza A  The patient was sent in from the dialysis center due to tachypnea and the parents that she was very short of breath with difficulty getting deep breaths  The patient herself does admit to some shortness of breath and also states that she has had a cough with some congestion  She states that she is bringing up phlegm that is mostly clear  Patient's influenza testing was positive  The patient denies any chest pain but the patient is found to also have an elevated troponin over 2  She denies any palpitations or dizziness  Past medical history:   Please see H&P for details    Special Studies:  CXR 12/29/19: No acute cardiopulmonary disease  CT head 12/28/19: Stable cerebral atrophy with chronic small vessel ischemic white matter disease and chronic infarction  No acute intracranial abnormality    VBS 1/8/19:  Pt presents with mild oropharyngeal dysphagia  Transient penetration was seen inconsistently with thin liquids  No aspiration was seen during the study     Recommendations: Dysphagia 2-mechanical/finely chopped solids with thin liquids via single sips      Social/Education/Vocational Hx:  Pt lives [employer]   Current Risks for Dysphagia & Aspiration: cognitive impairment, general debilitation  Current Symptoms/Concerns: coughing with po, Per RN, pt reported feeling like she is choking and unable to tolerate PO   Current Diet: NPO pending ST eval   Baseline Diet: regular diet and thin liquids, rec'd diet during previous hospital admission following VBS: dysphagia level 2 w/ thin liquids     Baseline Assessment   Behavior/Cognition: alert  Speech/Language Status: able to follow commands inconsistently and limited verbal output  Patient Positioning: upright in bed     Swallow Mechanism Exam   Facial: symmetrical at rest  Labial: WFL  Lingual: WFL  Velum: unable to visualize  Mandible: adequate ROM  Dentition: adequate  Vocal quality:clear/adequate   Volitional Cough: weak   Respiratory: 2L NC     Of note, pt presented w/ cough and subsequent expectoration of clear mucous prior to po intake  Consistencies Assessed and Performance   Consistencies Administered: puree, soft solid, NTL, thin liquids by straw    Oral Stage: pt required assistance w/ feeding, unable to bring cup to mouth independently - improved retrieval with straw  Decreased oral processing and slow mastication w/ soft solid  Suspect reduced oral control of thin > NTL by straw  No oral residue  Pharyngeal Stage: oral holding vs  Mildly delayed swallow initiation  Laryngeal rise present to palpation  Inconsistent immediate and delayed coughing w/ thin liquids  No overt s/s aspiration w/ puree, soft solid, or NTL  Facial grimacing noted w/ all po intake  Pt c/o mild odynophagia (1/10 severity) and soreness in throat, though pt and friend present report this is baseline (similar presentation noted during prior hospital admission)         Esophageal Concerns: none reported      Results Reviewed with: patient, RN and MD   Dysphagia Goals: pt will tolerate dysphagia level 2  with nectar thick liquids without s/s of aspiration x48 hours

## 2019-12-30 NOTE — H&P (VIEW-ONLY)
Consultation - Infectious Disease   Felicitas Ferreira 81 y o  female MRN: [id number]  Unit/Bed#: S -01 Encounter: [id number]      IMPRESSION & RECOMMENDATIONS:   1  Gram-positive bacteremia  Patient's blood cultures from admission noted to be positive x2  Unclear source at this time  Concern also for contaminant as patient is a difficult stick  Only prosthetic material is PermCath  Initial chest x-ray unremarkable  Patient otherwise hemodynamically stable  Repeat set of blood cultures ordered now  Will start vancomycin and Ancef after blood cultures  Pharmacy consult for vancomycin monitoring  Continue to trend fever curve/vitals  Repeat CBC with differential tomorrow  Follow up pending culture data  Follow-up 2D echo  Repeat chest x-ray ordered for morning  Additional interventions pending clinical course  Additional care as per primary    2  Influenza A  Patient found to be influenza a positive  She remains stable from a respiratory and hemodynamic standpoint  Unfortunately this places her at increased risk for pneumonia  Continue on Tamiflu dosed after dialysis  Maintain droplet isolation  Antibiotics as above  Follow up pending culture data as above  Additional care as per primary    3  End-stage renal disease on hemodialysis  Patient remains on hemodialysis via PermCath  Unclear if there has been any issues with the catheter itself recently  No acute signs of infection on exam today  Antibiotics as above  Ongoing follow-up by Nephrology  May need to consider catheter removal pending culture data  Additional care as per primary    4  Oral pharyngeal dysphagia  Patient has been seen by speech therapy and recommended for modified diet  Additional care as above  5  Thrombocytopenia and anemia  Suspect hemoglobin is chronically low in the setting of dialysis  Patient also with chronic fluctuations and platelet count    Continue monitor CBC  Transfusional support as per primary    Above plan discussed in detail with primary service attending and with nursing  ID consult service will continue to follow closely  HISTORY OF PRESENT ILLNESS:  Reason for Consult:  Bacteremia    HPI: Kenny Wilder is a 81y o  year old female with extensive past medical history which includes heart failure, dialysis, hyperlipidemia, hypertension  He presented to the hospital yesterday with increasing shortness of breath  Patient found to be tachypneic at her dialysis session  Patient brought into the emergency department and was later found to have positive influenza testing  No leukocytosis on admission  CT head unremarkable  Chest x-ray without acute findings  Patient was admitted for influenza and started on Tamiflu  Nephrology was consulted and dialysis was continued  Patient was later seen by Cardiology for elevated troponin  Patient received dialysis through a tunneled IJ catheter  Patient was also found to have dysphagia on evaluation by speech therapy and was switched to modified diet  Blood cultures later returned positive in patient was ordered for vancomycin  She has received dialysis today  Per nursing antibiotics have not been given  Blood cultures so far with gram-positive cocci in clusters  Flu testing again positive  Repeat blood cultures ordered now  No new imaging overnight  Patient's other vitals are stable  Patient has not been seen by our service previously  She has no prior culture data in our system  No significant data in Care everywhere  We are consulted at this time for further assistance in management given this patient's Gram-positive bacteremia in the setting of flu  On evaluation, patient is a very pleasant 20-year-old female  She is able to tell me that she is in the hospital and that she has diabetes and normally gets dialysis  It is difficult to get any history from her otherwise    She tends to trial off and conversation and she provides very abrupt answers at times  She reports that she is feeling better since being in the hospital   She denies having any nausea or vomiting  She denies having any chest pain or shortness of breath  She reports continuing to have significant cough and sputum production  She reports that she lives alone by herself and that she does not require assistance with any walker or wheelchair  She cannot recall any issues or history with her catheter otherwise  She denies having any prosthetic material in her body other than her catheter  REVIEW OF SYSTEMS:  A complete 12 point system-based review of systems is negative other than that noted in the HPI      PAST MEDICAL HISTORY:  Past Medical History:   Diagnosis Date    Altered mental status, unspecified     Anemia     Atherosclerotic heart disease of native coronary artery without angina pectoris     Cancer (HCC)     Chest pain     Chronic diastolic (congestive) heart failure (HCC)     Dependence on renal dialysis ([facility] [address] )     Diabetes mellitus (HCC)     End-stage renal disease (HCC)     Gastro-esophageal reflux disease without esophagitis     Hyperlipidemia     Hypertension     Long term current use of anticoagulant     Long term current use of insulin (HCC)     Muscle weakness     Nausea with vomiting     Other abnormalities of gait and mobility     Other specified abnormal findings of blood chemistry     Type 2 diabetes mellitus (HCC)     Ventral hernia without obstruction or gangrene     Vitamin D deficiency      Past Surgical History:   Procedure Laterality Date    AV FISTULA PLACEMENT      IR [id number] EXCHANGE  12/27/2019    MASTECTOMY      R arm       FAMILY HISTORY:  Non-contributory    SOCIAL HISTORY:  Social History   Social History     Substance and Sexual Activity   Alcohol Use No     Social History     Substance and Sexual Activity   Drug Use No     Social History     Tobacco Use   Smoking Status Never Smoker   Smokeless Tobacco Never Used ALLERGIES:  Allergies   Allergen Reactions    Percolone [Oxycodone]     Sulfa Antibiotics        MEDICATIONS:  All current active medications have been reviewed  PHYSICAL EXAM:  Temp:  [97 3 °F (36 3 °C)-98 4 °F (36 9 °C)] 98 4 °F (36 9 °C)  HR:  [60-76] 72  Resp:  [15-18] 18  BP: ()/(50-87) 133/50  SpO2:  [99 %-100 %] 100 %  Temp (24hrs), Av 9 °F (36 6 °C), Min:97 3 °F (36 3 °C), Max:98 4 °F (36 9 °C)  Current: Temperature: 98 4 °F (36 9 °C)    Intake/Output Summary (Last 24 hours) at 2019 1748  Last data filed at 2019 1155  Gross per 24 hour   Intake 800 ml   Output 4586 ml   Net -3786 ml       General Appearance:  Chronically ill-appearing elderly, nontoxic, and in no distress   Head:  Normocephalic, without obvious abnormality, atraumatic   Eyes:  Conjunctiva pink and sclera anicteric, both eyes   Nose: Nares normal, mucosa normal, no drainage   Throat: Oropharynx moist without lesions; poor dentition noted  Neck: Supple, symmetrical, no adenopathy, no tenderness/mass/nodules   Back:   Unable to fully turn patient at this time to evaluate her back   Lungs:   Patient with rhonchi and crackles bilaterally anteriorly, respirations unlabored on nasal cannula   Chest Wall:  No tenderness or deformity; catheter site noted on the patient's left chest without any signs of purulence or drainage  Heart:  RRR; no murmur, rub or gallop appreciated   Abdomen:   Soft, non-tender, non-distended, positive bowel sounds    Extremities: No cyanosis, clubbing or edema   Skin: No rashes or lesions  No draining wounds noted  Lymph nodes: Cervical, supraclavicular nodes normal   Neurologic: Difficult to obtain history but patient seems to be alert and oriented to person and to place but has issues with details related to time  She is able to move her upper extremities with some passive range of motion but does not seem to have very good movement in lower extremities         LABS, IMAGING, & OTHER STUDIES:  Lab Results:  I have personally reviewed pertinent labs  Results from last 7 days   Lab Units 12/30/19  0837 12/29/19  0849 12/28/19  0839   WBC Thousand/uL 4 37 9 65 7 36   HEMOGLOBIN g/dL 10 6* 11 3* 10 2*   PLATELETS Thousands/uL 133* 147* 137*     Results from last 7 days   Lab Units 12/30/19  0837 12/29/19  0849   POTASSIUM mmol/L 3 7 4 6   CHLORIDE mmol/L 99* 101   CO2 mmol/L 25 22   BUN mg/dL 48* 68*   CREATININE mg/dL 5 06* 7 25*   EGFR ml/min/1 73sq m 8 5   CALCIUM mg/dL 7 7* 8 3   AST U/L  --  58*   ALT U/L  --  57   ALK PHOS U/L  --  103     Results from last 7 days   Lab Units 12/29/19  0907 12/29/19  0849   GRAM STAIN RESULT  Gram positive cocci in clusters* Gram positive cocci in clusters*       Imaging Studies:   I have personally reviewed pertinent imaging study reports and images in PACS  Other Studies:   I have personally reviewed pertinent reports

## 2019-12-30 NOTE — PROGRESS NOTES
[address] [place] [address] 81 y o  female MRN: [id number]  Unit/Bed#: S -01 Encounter: [id number]  Reason for Consult:  ESRD    ASSESSMENT and PLAN:    81-year-old female with a past medical history of ESRD, hypertension who initially presented with shortness of breath on 12/29  Patient had a recent admission on 12/27 for catheter malfunction which was replaced on 12/27  Patient was sent in with shortness of breath and was found to have influenza positive swab  Nephrology is on board for ESRD  1) ESRD -     - on HD at [facility]  - access is left IJ tunneled dialysis catheter  - seen on dialysis  Tolerated 1 5 L UF (was challenged by 500 cc); did have hypotension and therefore UF was reduced  - HD prescription - Na 138, bicarb 30, F160, time 3 5 hours, EDW 87,   - next dialysis Thursday  - (had dialysis on 12/29 also)    2) electrolytes - stable    3) trop elevation    - per Primary Team and Cardiology team  - on hep gtt  - f/u ECHO    4) anemia - Hb above goal    5) HTN    - monitor for hypotension    6) MBD -  Cont phos binder and hecterol    7) influenza - tamiflu dose adjusted for renal dosing      SUBJECTIVE / INTERVAL HISTORY:    Pt seen and examined on dialysis  Patient feeling weak  Generalized aches and pains  Shortness of breath      OBJECTIVE:  Current Weight: Weight - Scale: 88 kg (194 lb 0 1 oz)  Vitals:    12/30/19 1000 12/30/19 1030 12/30/19 1100 12/30/19 1130   BP: 109/54 102/57 109/50 102/87   BP Location: Left arm Left arm Left arm Left arm   Pulse: 66 67 69 64   Resp: 16 16 16 16   Temp:       TempSrc:       SpO2:       Weight:           Intake/Output Summary (Last 24 hours) at 12/30/2019 1140  Last data filed at 12/30/2019 0815  Gross per 24 hour   Intake 800 ml   Output 2586 ml   Net -1786 ml     General: NAD, weak appearing  Skin: no rash  Eyes: anicteric sclera  ENT: dry mucous membrane  Neck: supple  Chest: no sig rales, but limited exam on dialysis  CVS: s1s2, no murmur, no gallop, no rub  Abdomen: soft, nontender, nl sounds  Extremities: no sig edema LE b/l  : no ambrosio  Neuro: AAOX3  Psych: normal affect    Medications:    Current Facility-Administered Medications:     acetaminophen (TYLENOL) tablet 650 mg, 650 mg, Oral, Q6H PRN, Jeimy Williamson DO, 650 mg at 12/30/19 1011    amLODIPine (NORVASC) tablet 10 mg, 10 mg, Oral, Daily, Jeimy Williamson DO    aspirin chewable tablet 81 mg, 81 mg, Oral, Daily, Jeimy Williamson DO    atorvastatin (LIPITOR) tablet 40 mg, 40 mg, Oral, QPM, Jeimy Williamson DO, Stopped at 12/29/19 2142    bisacodyl (DULCOLAX) EC tablet 5 mg, 5 mg, Oral, Daily PRN, Jeimy Williamson DO    busPIRone (BUSPAR) tablet 5 mg, 5 mg, Oral, TID, Jeimy Williamson DO    docusate sodium (COLACE) capsule 100 mg, 100 mg, Oral, BID, Jeimy Williamson DO, Stopped at 12/29/19 2142    doxercalciferol (HECTOROL) injection 1 mcg, 1 mcg, Intravenous, After Dialysis, Jeimy Williamson DO, 1 mcg at 12/30/19 0926    fentaNYL (DURAGESIC) 25 mcg/hr TD 72 hr patch 1 patch, 1 patch, Transdermal, Q72H, Jeimy Williamson DO, 1 patch at 12/29/19 2212    heparin (porcine) 25,000 units in 250 mL infusion (premix), 3-20 Units/kg/hr (Order-Specific), Intravenous, Titrated, Jeimy Williamson DO, Last Rate: 11 8 mL/hr at 12/30/19 1011, 13 111 Units/kg/hr at 12/30/19 1011    heparin (porcine) injection 2,000 Units, 2,000 Units, Intravenous, PRN, Jeimy Williamson, DO, 2,000 Units at 12/29/19 1754    heparin (porcine) injection 4,000 Units, 4,000 Units, Intravenous, PRN, Jeimy , DO    ketorolac (ACULAR) 0 5 % ophthalmic solution 1 drop, 1 drop, Both Eyes, 4x Daily, Jeimy Williamson DO, Stopped at 12/29/19 1830    labetalol (NORMODYNE) tablet 100 mg, 100 mg, Oral, BID, Jeiym Williamson DO, Stopped at 12/29/19 1850    nitroglycerin (NITROSTAT) SL tablet 0 4 mg, 0 4 mg, Sublingual, Q5 Min PRN, Jeimy Williamson DO    oseltamivir (TAMIFLU) capsule 30 mg, 30 mg, Oral, Once, Purnima Ibarra DO    oseltamivir (TAMIFLU) capsule 30 mg, 30 mg, Oral, After Dialysis, Purnima Ibarra DO    polyethylene glycol (MIRALAX) packet 17 g, 17 g, Oral, Daily, Purnima Ibarra DO    sertraline (ZOLOFT) tablet 50 mg, 50 mg, Oral, Daily, Purnima Ibarra DO    sevelamer (RENAGEL) tablet 800 mg, 800 mg, Oral, TID With Meals, Purnima Ibarra DO, Stopped at 12/29/19 6434    Laboratory Results:  Results from last 7 days   Lab Units 12/30/19  0837 12/29/19  0849 12/28/19  0839 12/27/19  0431 12/26/19  1619 12/26/19  1031   WBC Thousand/uL 4 37 9 65 7 36  --  6 76  --    HEMOGLOBIN g/dL 10 6* 11 3* 10 2*  --  10 3*  --    HEMATOCRIT % 33 8* 37 8 34 2*  --  34 5*  --    PLATELETS Thousands/uL 133* 147* 137*  --  146*  154  --    POTASSIUM mmol/L 3 7 4 6 4 1 4 8  --  4 1   CHLORIDE mmol/L 99* 101 101 107  --  107   CO2 mmol/L 25 22 24 24  --  19*   BUN mg/dL 48* 68* 49* 66*  --  57*   CREATININE mg/dL 5 06* 7 25* 5 88* 7 59*  --  6 28*   CALCIUM mg/dL 7 7* 8 3 8 2* 8 7  --  8 0*   MAGNESIUM mg/dL  --  2 5  --   --   --   --

## 2019-12-31 ENCOUNTER — APPOINTMENT (INPATIENT)
Dept: RADIOLOGY | Facility: HOSPITAL | Age: 84
DRG: 193 | End: 2019-12-31
Payer: MEDICARE

## 2019-12-31 ENCOUNTER — APPOINTMENT (INPATIENT)
Dept: ULTRASOUND IMAGING | Facility: HOSPITAL | Age: 84
DRG: 193 | End: 2019-12-31
Payer: MEDICARE

## 2019-12-31 PROBLEM — R78.81 GRAM-POSITIVE BACTEREMIA: Status: ACTIVE | Noted: 2019-12-31

## 2019-12-31 PROBLEM — M79.89 SWELLING OF EXTREMITY: Status: ACTIVE | Noted: 2019-12-31

## 2019-12-31 PROBLEM — D69.6 THROMBOCYTOPENIA (HCC): Status: ACTIVE | Noted: 2019-12-31

## 2019-12-31 LAB
ANION GAP SERPL CALCULATED.3IONS-SCNC: 14 MMOL/L (ref 4–13)
APTT PPP: >210 SECONDS (ref 23–37)
BASOPHILS # BLD AUTO: 0.02 THOUSANDS/ΜL (ref 0–0.1)
BASOPHILS NFR BLD AUTO: 0 % (ref 0–1)
BUN SERPL-MCNC: 36 MG/DL (ref 5–25)
CALCIUM SERPL-MCNC: 7.7 MG/DL (ref 8.3–10.1)
CHLORIDE SERPL-SCNC: 99 MMOL/L (ref 100–108)
CO2 SERPL-SCNC: 23 MMOL/L (ref 21–32)
CREAT SERPL-MCNC: 4.42 MG/DL (ref 0.6–1.3)
EOSINOPHIL # BLD AUTO: 0.15 THOUSAND/ΜL (ref 0–0.61)
EOSINOPHIL NFR BLD AUTO: 3 % (ref 0–6)
ERYTHROCYTE [DISTWIDTH] IN BLOOD BY AUTOMATED COUNT: 15.9 % (ref 11.6–15.1)
GFR SERPL CREATININE-BSD FRML MDRD: 10 ML/MIN/1.73SQ M
GLUCOSE SERPL-MCNC: 128 MG/DL (ref 65–140)
HCT VFR BLD AUTO: 32.6 % (ref 34.8–46.1)
HGB BLD-MCNC: 10.2 G/DL (ref 11.5–15.4)
IMM GRANULOCYTES # BLD AUTO: 0.01 THOUSAND/UL (ref 0–0.2)
IMM GRANULOCYTES NFR BLD AUTO: 0 % (ref 0–2)
LYMPHOCYTES # BLD AUTO: 1.01 THOUSANDS/ΜL (ref 0.6–4.47)
LYMPHOCYTES NFR BLD AUTO: 18 % (ref 14–44)
MCH RBC QN AUTO: 27.9 PG (ref 26.8–34.3)
MCHC RBC AUTO-ENTMCNC: 31.3 G/DL (ref 31.4–37.4)
MCV RBC AUTO: 89 FL (ref 82–98)
MONOCYTES # BLD AUTO: 1.16 THOUSAND/ΜL (ref 0.17–1.22)
MONOCYTES NFR BLD AUTO: 20 % (ref 4–12)
MRSA NOSE QL CULT: NORMAL
NEUTROPHILS # BLD AUTO: 3.34 THOUSANDS/ΜL (ref 1.85–7.62)
NEUTS SEG NFR BLD AUTO: 59 % (ref 43–75)
NRBC BLD AUTO-RTO: 0 /100 WBCS
PLATELET # BLD AUTO: 127 THOUSANDS/UL (ref 149–390)
PMV BLD AUTO: 10.8 FL (ref 8.9–12.7)
POTASSIUM SERPL-SCNC: 3.4 MMOL/L (ref 3.5–5.3)
RBC # BLD AUTO: 3.66 MILLION/UL (ref 3.81–5.12)
SODIUM SERPL-SCNC: 136 MMOL/L (ref 136–145)
WBC # BLD AUTO: 5.69 THOUSAND/UL (ref 4.31–10.16)

## 2019-12-31 PROCEDURE — 85730 THROMBOPLASTIN TIME PARTIAL: CPT | Performed by: INTERNAL MEDICINE

## 2019-12-31 PROCEDURE — 99232 SBSQ HOSP IP/OBS MODERATE 35: CPT | Performed by: INTERNAL MEDICINE

## 2019-12-31 PROCEDURE — 80048 BASIC METABOLIC PNL TOTAL CA: CPT | Performed by: PHYSICIAN ASSISTANT

## 2019-12-31 PROCEDURE — 85025 COMPLETE CBC W/AUTO DIFF WBC: CPT | Performed by: INTERNAL MEDICINE

## 2019-12-31 PROCEDURE — 93971 EXTREMITY STUDY: CPT | Performed by: SURGERY

## 2019-12-31 PROCEDURE — 87077 CULTURE AEROBIC IDENTIFY: CPT | Performed by: INTERNAL MEDICINE

## 2019-12-31 PROCEDURE — 87147 CULTURE TYPE IMMUNOLOGIC: CPT | Performed by: INTERNAL MEDICINE

## 2019-12-31 PROCEDURE — 87040 BLOOD CULTURE FOR BACTERIA: CPT | Performed by: INTERNAL MEDICINE

## 2019-12-31 PROCEDURE — 92526 ORAL FUNCTION THERAPY: CPT

## 2019-12-31 PROCEDURE — 93971 EXTREMITY STUDY: CPT

## 2019-12-31 PROCEDURE — 99233 SBSQ HOSP IP/OBS HIGH 50: CPT | Performed by: INTERNAL MEDICINE

## 2019-12-31 PROCEDURE — 87186 SC STD MICRODIL/AGAR DIL: CPT | Performed by: INTERNAL MEDICINE

## 2019-12-31 PROCEDURE — 71045 X-RAY EXAM CHEST 1 VIEW: CPT

## 2019-12-31 PROCEDURE — 99233 SBSQ HOSP IP/OBS HIGH 50: CPT | Performed by: NURSE PRACTITIONER

## 2019-12-31 RX ORDER — METOPROLOL TARTRATE 50 MG/1
50 TABLET, FILM COATED ORAL EVERY 12 HOURS SCHEDULED
Status: DISCONTINUED | OUTPATIENT
Start: 2020-01-01 | End: 2020-01-08 | Stop reason: HOSPADM

## 2019-12-31 RX ORDER — ONDANSETRON 2 MG/ML
4 INJECTION INTRAMUSCULAR; INTRAVENOUS EVERY 6 HOURS PRN
Status: DISCONTINUED | OUTPATIENT
Start: 2019-12-31 | End: 2020-01-08 | Stop reason: HOSPADM

## 2019-12-31 RX ADMIN — KETOROLAC TROMETHAMINE 1 DROP: 5 SOLUTION OPHTHALMIC at 12:16

## 2019-12-31 RX ADMIN — BUSPIRONE HYDROCHLORIDE 5 MG: 5 TABLET ORAL at 22:51

## 2019-12-31 RX ADMIN — ATORVASTATIN CALCIUM 40 MG: 40 TABLET, FILM COATED ORAL at 18:45

## 2019-12-31 RX ADMIN — METOPROLOL TARTRATE 25 MG: 25 TABLET, FILM COATED ORAL at 09:12

## 2019-12-31 RX ADMIN — SEVELAMER HYDROCHLORIDE 800 MG: 800 TABLET, FILM COATED PARENTERAL at 16:09

## 2019-12-31 RX ADMIN — HEPARIN SODIUM AND DEXTROSE 13.1 UNITS/KG/HR: 10000; 5 INJECTION INTRAVENOUS at 09:22

## 2019-12-31 RX ADMIN — DOCUSATE SODIUM 100 MG: 100 CAPSULE, LIQUID FILLED ORAL at 18:45

## 2019-12-31 RX ADMIN — BUSPIRONE HYDROCHLORIDE 5 MG: 5 TABLET ORAL at 09:12

## 2019-12-31 RX ADMIN — POLYETHYLENE GLYCOL 3350 17 G: 17 POWDER, FOR SOLUTION ORAL at 09:11

## 2019-12-31 RX ADMIN — SEVELAMER HYDROCHLORIDE 800 MG: 800 TABLET, FILM COATED PARENTERAL at 09:12

## 2019-12-31 RX ADMIN — SEVELAMER HYDROCHLORIDE 800 MG: 800 TABLET, FILM COATED PARENTERAL at 12:16

## 2019-12-31 RX ADMIN — DOCUSATE SODIUM 100 MG: 100 CAPSULE, LIQUID FILLED ORAL at 09:12

## 2019-12-31 RX ADMIN — AMLODIPINE BESYLATE 10 MG: 10 TABLET ORAL at 09:12

## 2019-12-31 RX ADMIN — ONDANSETRON 4 MG: 2 INJECTION INTRAMUSCULAR; INTRAVENOUS at 13:18

## 2019-12-31 RX ADMIN — ASPIRIN 81 MG 81 MG: 81 TABLET ORAL at 09:12

## 2019-12-31 RX ADMIN — KETOROLAC TROMETHAMINE 1 DROP: 5 SOLUTION OPHTHALMIC at 18:45

## 2019-12-31 RX ADMIN — KETOROLAC TROMETHAMINE 1 DROP: 5 SOLUTION OPHTHALMIC at 09:13

## 2019-12-31 RX ADMIN — METOPROLOL TARTRATE 25 MG: 25 TABLET, FILM COATED ORAL at 16:09

## 2019-12-31 RX ADMIN — OSELTAMIVIR PHOSPHATE 30 MG: 30 CAPSULE ORAL at 18:45

## 2019-12-31 RX ADMIN — VANCOMYCIN HYDROCHLORIDE 750 MG: 750 INJECTION, SOLUTION INTRAVENOUS at 19:45

## 2019-12-31 RX ADMIN — SERTRALINE HYDROCHLORIDE 50 MG: 50 TABLET ORAL at 09:12

## 2019-12-31 RX ADMIN — KETOROLAC TROMETHAMINE 1 DROP: 5 SOLUTION OPHTHALMIC at 22:51

## 2019-12-31 RX ADMIN — BUSPIRONE HYDROCHLORIDE 5 MG: 5 TABLET ORAL at 16:09

## 2019-12-31 NOTE — ASSESSMENT & PLAN NOTE
· Anticoagulation - heparin drip  Initially, now stopped  · Anti-platelet -  Aspirin  · Beta-blocker - continue labetalol  · Statin - continue atorvastatin  · Nitrate - p r n  Nitroglycerin  · Evaluation -   · Cardiology consultation appreciated  · Echocardiogram: Ejection fraction was estimated in the range of 55 % to 60 %  There was mild hypokinesis of the entire inferior wall(s)  There was possible hypokinesis of the basal-mid inferolateral wall(s)  grade 1 diastolic dysfunction  · Trops elevated with max 2 19, EKG without ischemic change  · Currently patient denies any chest pain symptoms, but poor historian

## 2019-12-31 NOTE — PROGRESS NOTES
General Cardiology   Progress Note -  Team One   Kenan Cruz 81 y o  female MRN: [id number]    Unit/Bed#: S -01 Encounter: [id number]    Assessment/ Plan:    1  Non-MI troponin elevation: 2 19, 1 59, 1 7;  Pt admitted with influenza; no cardiac complaints, no known hx  An echo was done yesterday that revealed preserved EF 55% with mild hypokinesis of inferior wall  And possible hypokinesis of basal-mid inferolateral walls  She continues to be asymptomatic from a cardiac standpoint and is here with influenza and fevers  She is debilitated and has very low functional capacity at baseline  Can stop IV heparin now as she has completed 48 hours of heparin infusion  Continue ASA, statin and BB (changed from labetalol to metoprolol)  She can follow up with cardiology after discharge and once she is recovered from illness; consider OP stress testing but this can be addressed as OP  2  Influenza A: on Tamiflu and supportive measures per primary team      3  Gram + Bacteremia: ID now following  Concern for contaminate; repeat cultures pending  On Vancomycin       4  Hypoxia: likely 2/2 #2; continue supportive measures; remains on O2 via NC    5  ESRD on Hemodialysis: f/b nephrology; she does remain mildly volume overloaded on exam but volume to be managed with HD       6  HTN: continue metoprolol and norvasc      7  HLD: continue home atorvastatin; lipid panel adequately controlled; LDL 46, trig 49, total 100, hdl 44      Cardiology will sign off, please call with questions  Subjective: pt seen for follow up  No events overnight per nursing  She continues to deny any chest pain or palpitations  Still coughing and SOB  Daughter present in room  Review of Systems   Constitution: Positive for malaise/fatigue  Negative for decreased appetite and fever  Cardiovascular: Negative for chest pain, leg swelling, orthopnea and palpitations     Gastrointestinal: Negative for abdominal pain, nausea and vomiting  Neurological: Negative for dizziness and light-headedness  Psychiatric/Behavioral: Negative for altered mental status  Objective:   Vitals: Blood pressure 151/57, pulse 76, temperature 99 °F (37 2 °C), temperature source Oral, resp  rate 18, height 5' 3" (1 6 m), weight 88 kg (194 lb 0 1 oz), SpO2 99 %  ,     Body mass index is 34 37 kg/m²  ,     Systolic (37JQN), VTF:123 , Min:133 , IIQ:222     Diastolic (51JUX), WNH:03, Min:50, Max:59      Intake/Output Summary (Last 24 hours) at 12/31/2019 1423  Last data filed at 12/31/2019 0501  Gross per 24 hour   Intake 486 38 ml   Output    Net 486 38 ml     Weight (last 2 days)     Date/Time   Weight    12/31/19 1224   88 (194 01)    12/30/19 0600   88 (194 01)    12/29/19 1824   88 8 (195 77)    12/29/19 1036   94 (207 23)            Telemetry Review:   Sinus rhythm, no significant events; Physical Exam   Constitutional: No distress  Pt sitting up in bed in NAd; alert and cooperative   HENT:   Head: Normocephalic and atraumatic  Cardiovascular: Normal rate, regular rhythm, S1 normal and S2 normal    No murmur heard  Trace LE edema   Pulmonary/Chest:   Poor effort  LS coarse with fine expiratory wheezes; + moist cough  On O2 via NC     Abdominal: Soft  Neurological: She is alert  Skin: Skin is warm  She is not diaphoretic  Nursing note and vitals reviewed      LABORATORY RESULTS  Results from last 7 days   Lab Units 12/30/19  0837 12/29/19  2135 12/29/19  1852   TROPONIN I ng/mL 1 39* 1 70* 1 59*     CBC with diff: Results from last 7 days   Lab Units 12/31/19  1001 12/30/19  0837 12/29/19  0849 12/28/19  0839 12/26/19  1619   WBC Thousand/uL 5 69 4 37 9 65 7 36 6 76   HEMOGLOBIN g/dL 10 2* 10 6* 11 3* 10 2* 10 3*   HEMATOCRIT % 32 6* 33 8* 37 8 34 2* 34 5*   MCV fL 89 90 92 92 92   PLATELETS Thousands/uL 127* 133* 147* 137* 146*  154   MCH pg 27 9 28 1 27 6 27 4 27 5   MCHC g/dL 31 3* 31 4 29 9* 29 8* 29 9*   RDW % 15 9* 16 0* 16 2* 16 2* 16 2*   MPV fL 10 8 10 9 11 0 10 9 10 2  11 1   NRBC AUTO /100 WBCs 0  --  0 0  --      CMP:  Results from last 7 days   Lab Units 19  1001 19  0837 19  0849 19  0839 19  0431 19  1031   POTASSIUM mmol/L 3 4* 3 7 4 6 4 1 4 8 4 1   CHLORIDE mmol/L 99* 99* 101 101 107 107   CO2 mmol/L 23 25 22 24 24 19*   BUN mg/dL 36* 48* 68* 49* 66* 57*   CREATININE mg/dL 4 42* 5 06* 7 25* 5 88* 7 59* 6 28*   CALCIUM mg/dL 7 7* 7 7* 8 3 8 2* 8 7 8 0*   AST U/L  --   --  58*  --   --   --    ALT U/L  --   --  57  --   --   --    ALK PHOS U/L  --   --  103  --   --   --    EGFR ml/min/1 73sq m 10 8 5 7 5 7     BMP:  Results from last 7 days   Lab Units 19  1001 19  0837 19  0849 19  0839 19  0431 19  1031   POTASSIUM mmol/L 3 4* 3 7 4 6 4 1 4 8 4 1   CHLORIDE mmol/L 99* 99* 101 101 107 107   CO2 mmol/L 23 25 22 24 24 19*   BUN mg/dL 36* 48* 68* 49* 66* 57*   CREATININE mg/dL 4 42* 5 06* 7 25* 5 88* 7 59* 6 28*   CALCIUM mg/dL 7 7* 7 7* 8 3 8 2* 8 7 8 0*      Results from last 7 days   Lab Units 19  0849   MAGNESIUM mg/dL 2 5        Results from last 7 days   Lab Units 19  0849 19  1619   INR  1 20* 1 20*     Lipid Profile:   No results found for: CHOL  Lab Results   Component Value Date    HDL 44 2019    HDL 36 (L) 2019     Lab Results   Component Value Date    LDLCALC 46 2019    LDLCALC 118 (H) 2019     Lab Results   Component Value Date    TRIG 49 2019    TRIG 69 2019     Cardiac testing:   Results for orders placed during the hospital encounter of 19   Echo complete with contrast if indicated    Narrative [address] [place] [address]  [phone]    Transthoracic Echocardiogram  2D, M-mode, Doppler, and Color Doppler    Study date:  30-Dec-2019    Patient: Zulma Suárez  MR number: [id number]  Account number: [de-identified]  : [id number]  Age: 81 years  Gender: Female  Status: Inpatient  Location: Bedside  Height: 63 in  Weight: 193 6 lb  BP: 123/ 57 mmHg    Indications: Elevated troponin  Diagnoses: R79 9 - Abnormal finding of blood chemistry, unspecified    Sonographer:  Dalia Levy RDCS  Primary Physician:  Jozef Zaldivar MD  Referring Physician: TIANA Blanca  Group:  [facility]  Interpreting Physician:  Sebastien Melendez MD    SUMMARY    LEFT VENTRICLE:  Systolic function was normal by visual assessment  Ejection fraction was estimated in the range of 55 % to 60 %  There was mild hypokinesis of the entire inferior wall(s)  There was possible hypokinesis of the basal-mid inferolateral wall(s)  Doppler parameters were consistent with abnormal left ventricular relaxation (grade 1 diastolic dysfunction)  RIGHT VENTRICLE:  The size was normal   Systolic function was normal     LEFT ATRIUM:  The atrium was moderately dilated  RIGHT ATRIUM:  The atrium was mildly dilated  MITRAL VALVE:  There was mild regurgitation  TRICUSPID VALVE:  There was trace regurgitation  Estimated peak PA pressure was 25 mmHg  HISTORY: PRIOR HISTORY: ESRD on dialysis  Hypertension  Opioid dependence  PROCEDURE: The procedure was performed at the bedside  This was a routine study  The transthoracic approach was used  The study included complete 2D imaging, M-mode, complete spectral Doppler, and color Doppler  The heart rate was 75 bpm,  at the start of the study  Echocardiographic views were limited due to restricted patient mobility  Image quality was adequate  LEFT VENTRICLE: Size was normal  Systolic function was normal by visual assessment  Ejection fraction was estimated in the range of 55 % to 60 %  There was mild hypokinesis of the entire inferior wall(s)  There was possible hypokinesis of  the basal-mid inferolateral wall(s)   Wall thickness was normal  DOPPLER: Doppler parameters were consistent with abnormal left ventricular relaxation (grade 1 diastolic dysfunction)  RIGHT VENTRICLE: The size was normal  Systolic function was normal  Wall thickness was normal     LEFT ATRIUM: The atrium was moderately dilated  RIGHT ATRIUM: The atrium was mildly dilated  MITRAL VALVE: Valve structure was normal  There was normal leaflet separation  DOPPLER: The transmitral velocity was within the normal range  There was no evidence for stenosis  There was mild regurgitation  AORTIC VALVE: The valve was trileaflet  Leaflets exhibited normal thickness and normal cuspal separation  DOPPLER: Transaortic velocity was within the normal range  There was no evidence for stenosis  TRICUSPID VALVE: The valve structure was normal  There was normal leaflet separation  DOPPLER: The transtricuspid velocity was within the normal range  There was no evidence for stenosis  There was trace regurgitation  Estimated peak PA  pressure was 25 mmHg  PULMONIC VALVE: Leaflets exhibited normal thickness, no calcification, and normal cuspal separation  DOPPLER: The transpulmonic velocity was within the normal range  There was no significant regurgitation  PERICARDIUM: There was no pericardial effusion  The pericardium was normal in appearance  AORTA: The root exhibited normal size  SYSTEMIC VEINS: IVC: The inferior vena cava was normal in size   Respirophasic changes were normal     MEASUREMENT TABLES    OTHER ECHO MEASUREMENTS  (Reference normals)  Estimated CVP   5 mmHg   (--)    SYSTEM MEASUREMENT TABLES    2D  %FS: 29 45 %  Ao Diam: 3 17 cm  EDV(Teich): 113 99 ml  EF(Teich): 56 22 %  ESV(Teich): 49 91 ml  IVSd: 1 26 cm  LA Area: 23 98 cm2  LA Diam: 4 59 cm  LVEDV MOD A4C: 87 13 ml  LVEF MOD A4C: 58 68 %  LVESV MOD A4C: 36 ml  LVIDd: 4 92 cm  LVIDs: 3 47 cm  LVLd A4C: 6 77 cm  LVLs A4C: 5 82 cm  LVPWd: 1 29 cm  RA Area: 17 41 cm2  RVIDd: 3 88 cm  SV MOD A4C: 51 13 ml  SV(Teich): 64 08 ml    CW  TR MaxP 94 mmHg  TR Vmax: 2 29 m/s    MM  TAPSE: 2 21 cm    PW  E': 0 05 m/s  E/E': 13 16  MV A David: 0 92 m/s  MV Dec [place]: 3 4 m/s2  MV DecT: 202 9 ms  MV E David: 0 69 m/s  MV E/A Ratio: 0 75  MV PHT: 58 84 ms  MVA By PHT: 3 74 cm2    [facility]redited [facility]    Prepared and electronically signed by    Kasey Patel MD  Signed 30-Dec-2019 19:22:04       Meds/Allergies   current meds:   Current Facility-Administered Medications   Medication Dose Route Frequency    acetaminophen (TYLENOL) tablet 650 mg  650 mg Oral Q6H PRN    amLODIPine (NORVASC) tablet 10 mg  10 mg Oral Daily    aspirin chewable tablet 81 mg  81 mg Oral Daily    atorvastatin (LIPITOR) tablet 40 mg  40 mg Oral QPM    bisacodyl (DULCOLAX) EC tablet 5 mg  5 mg Oral Daily PRN    busPIRone (BUSPAR) tablet 5 mg  5 mg Oral TID    ceFAZolin (ANCEF) IVPB (premix) 1,000 mg  1,000 mg Intravenous Q24H    docusate sodium (COLACE) capsule 100 mg  100 mg Oral BID    doxercalciferol (HECTOROL) injection 1 mcg  1 mcg Intravenous After Dialysis    fentaNYL (DURAGESIC) 25 mcg/hr TD 72 hr patch 1 patch  1 patch Transdermal Q72H    heparin (porcine) 25,000 units in 250 mL infusion (premix)  3-20 Units/kg/hr (Order-Specific) Intravenous Titrated    heparin (porcine) injection 2,000 Units  2,000 Units Intravenous PRN    heparin (porcine) injection 4,000 Units  4,000 Units Intravenous PRN    ketorolac (ACULAR) 0 5 % ophthalmic solution 1 drop  1 drop Both Eyes 4x Daily    metoprolol tartrate (LOPRESSOR) tablet 25 mg  25 mg Oral TID    nitroglycerin (NITROSTAT) SL tablet 0 4 mg  0 4 mg Sublingual Q5 Min PRN    ondansetron (ZOFRAN) injection 4 mg  4 mg Intravenous Q6H PRN    oseltamivir (TAMIFLU) capsule 30 mg  30 mg Oral Once    oseltamivir (TAMIFLU) capsule 30 mg  30 mg Oral After Dialysis    polyethylene glycol (MIRALAX) packet 17 g  17 g Oral Daily    sertraline (ZOLOFT) tablet 50 mg  50 mg Oral Daily    sevelamer (RENAGEL) tablet 800 mg  800 mg Oral TID With Meals    vancomycin (VANCOCIN) IVPB (premix) 750 mg  10 mg/kg (Adjusted) Intravenous After Dialysis     Medications Prior to Admission   Medication    acetaminophen (TYLENOL) 325 mg tablet    albuterol (2 5 mg/3 mL) 0 083 % nebulizer solution    albuterol (PROVENTIL HFA,VENTOLIN HFA) 90 mcg/act inhaler    amLODIPine (NORVASC) 10 mg tablet    aspirin 81 mg chewable tablet    atorvastatin (LIPITOR) 40 mg tablet    bisacodyl (DULCOLAX) 5 mg EC tablet    busPIRone (BUSPAR) 5 mg tablet    docusate sodium (COLACE) 100 mg capsule    fentaNYL (DURAGESIC) 25 mcg/hr    guaiFENesin (MUCINEX) 600 mg 12 hr tablet    ketorolac (ACULAR) 0 4 % SOLN    labetalol (NORMODYNE) 100 mg tablet    nitroglycerin (NITROSTAT) 0 4 mg SL tablet    polyethylene glycol (MIRALAX) 17 g packet    sertraline (ZOLOFT) 25 mg tablet    sevelamer (RENAGEL) 800 mg tablet       heparin (porcine) 3-20 Units/kg/hr (Order-Specific) Last Rate: Stopped (12/31/19 1205)     Assessment:  Principal Problem:    Influenza A  Active Problems:    ESRD on hemodialysis (HCC)    Essential hypertension    Chronic Aphasia / Cognitive impairment    Continuous opioid dependence (HCC)    Elevated troponin, rule out NSTEMI    Oropharyngeal dysphagia    Gram-positive bacteremia    Thrombocytopenia (HCC)    Swelling of extremity    Counseling / Coordination of Care  Total floor / unit time spent today 20 minutes  Greater than 50% of total time was spent with the patient and / or family counseling and / or coordination of care  ** Please Note: Dragon 360 Dictation voice to text software may have been used in the creation of this document   **

## 2019-12-31 NOTE — PROGRESS NOTES
Progress Note - Infectious Disease   Liset Villanueva 81 y o  female MRN: [id number]  Unit/Bed#: S -01 Encounter: [id number]    ADDENDUM 16:54 - Blood cultures updated with Staph Epidermidis  Cefazolin can be discontinued and she should remain on IV vancomycin  Her dialysis schedule has changed due to the holiday and she should receive an additional dose of Vancomycin, 750mg, tonight  I have notified the pharmacy of dose change  Impression/Plan:  1  Gram-positive bacteremia  Present in both sets of patient's initial blood cultures  Unclear source  Concern this may be related to her PermCath  Also concern this could just be skin contamination as patient is a very difficult stick  No other clear source appreciated  Her chest x-ray showed no acute infectious cardiopulmonary findings  No open wounds or rashes to the skin  Repeat blood cultures have been sent but these were drawn after patient already received IV vancomycin and cefazolin  TTE was negative for valvular vegetation  Fortunately the patient remains clinically stable and nontoxic  She is afebrile without leukocytosis  Will continue the vancomycin and cefazolin for now   -continue vancomycin dosed for HD  -continue pharmacy consult for guidance on vancomycin dosage  -continue cefazolin dosed for HD  -check CBCD tomorrow  -follow up blood cultures  -follow up repeat blood cultures  -monitor vitals    2  Influenza A  PCR was positive on 12/29/2019  Fortunately she remains stable from a respiratory and hemodynamic standpoint  She has been started on Tamiflu and is tolerating without difficulty  -continue on Tamiflu dosed after dialysis  -monitor CBC  -monitor vitals  -monitor respiratory status  -maintain droplet isolation  -supportive care    3  End-stage renal disease  On HD  Her current dialysis schedule is Tuesday/Thursday/Saturday  Her current access is a left PermCath    Unclear if there has been any issues with the catheter itself recently  No acute signs of infection on exam today  Consider patient's catheter may need to be removed if her bacteremia persists   -dose antibiotics for HD  -HD per Nephrology  -continue close follow-up with Nephrology     4  Oral pharyngeal dysphagia  Patient has been assessed by speech pathology and is currently on a dysphagia level two diet with nectar thick liquids   -continue follow-up with speech pathology     5  Thrombocytopenia and anemia  Suspect hemoglobin is chronically low in the setting of dialysis  Patient also with chronic fluctuations and platelet count  Platelet count slightly decreased to 127 today   -check CBCD tomorrow    6  R arm swelling  Likely secondary to infiltrated IV  She is currently undergoing a right upper extremity venous duplex    -serial right arm exams     Above plan was discussed in detail with patient at the bedside  Antibiotics:  Vancomycin 2  Cefazolin 2  Antibiotics 2    Subjective:  Patient is minimally interactive  She has no acute complaints  She denies pain at the site of her PermCath  She does have pain in her right arm which is currently undergoing a duplex study  Objective:  Vitals:  Temp:  [98 4 °F (36 9 °C)-99 1 °F (37 3 °C)] 99 °F (37 2 °C)  HR:  [72-76] 76  Resp:  [18] 18  BP: (133-151)/(50-59) 151/57  SpO2:  [98 %-100 %] 99 %  Temp (24hrs), Av 8 °F (37 1 °C), Min:98 4 °F (36 9 °C), Max:99 1 °F (37 3 °C)  Current: Temperature: 99 °F (37 2 °C)    Physical Exam:   General Appearance:  Awake, somewhat interactive, in no acute distress  Patient appears chronically ill and debilitated  Throat: Oropharynx moist without lesions      Lungs:   Decreased but clear to auscultation bilaterally; no wheezes, rhonchi or rales; respirations unlabored; patient is on room air   Chest: Left dialysis catheter intact, no leakage at site, no erythema although difficult to detect due to her dark skin tone, no overlying edema or palpable fluctuance, no pain with palpation   Heart:  RRR; no murmur, rub or gallop   Abdomen:   Soft, obese, non-tender, non-distended, positive bowel sounds  Extremities: No clubbing or cyanosis, no lower extremity edema; right arm is mildly edematous, ROM seems limited due to pain   Skin: No new rashes, lesions, or draining wounds noted on exposed skin  Labs, Imaging, & Other studies:   All pertinent labs and imaging studies were personally reviewed  Results from last 7 days   Lab Units 12/31/19  1001 12/30/19  0837 12/29/19  0849   WBC Thousand/uL 5 69 4 37 9 65   HEMOGLOBIN g/dL 10 2* 10 6* 11 3*   PLATELETS Thousands/uL 127* 133* 147*     Results from last 7 days   Lab Units 12/31/19  1001  12/29/19  0849   POTASSIUM mmol/L 3 4*   < > 4 6   CHLORIDE mmol/L 99*   < > 101   CO2 mmol/L 23   < > 22   BUN mg/dL 36*   < > 68*   CREATININE mg/dL 4 42*   < > 7 25*   EGFR ml/min/1 73sq m 10   < > 5   CALCIUM mg/dL 7 7*   < > 8 3   AST U/L  --   --  58*   ALT U/L  --   --  57   ALK PHOS U/L  --   --  103    < > = values in this interval not displayed       Results from last 7 days   Lab Units 12/29/19  2135 12/29/19  0907 12/29/19  0849   GRAM STAIN RESULT   --  Gram positive cocci in clusters* Gram positive cocci in clusters*   MRSA CULTURE ONLY  No Methicillin Resistant Staphlyococcus aureus (MRSA) isolated  --   --

## 2019-12-31 NOTE — ASSESSMENT & PLAN NOTE
· 2/2 blood cultures bottles + gram + cocci in clusters   · ID consult appreciated: started on vancomycin IV and ancef   · Pt refused repeat blood cultures yesterday and vanco was initiated   Pt was agreeable to repeat blood cultures this am and they have been obtained and sent

## 2019-12-31 NOTE — PROGRESS NOTES
[address] [place] [address] 81 y o  female MRN: [id number]  Unit/Bed#: S -01 Encounter: [id number]  Reason for Consult: ESRD    ASSESSMENT and PLAN:    29-year-old female with a past medical history of ESRD, hypertension who initially presented with shortness of breath on 12/29  Patient had a recent admission on 12/27 for catheter malfunction which was replaced on 12/27  Patient was sent in with shortness of breath and was found to have influenza positive swab  Nephrology is on board for ESRD      1) ESRD -      - on HD at [facility]  - access is left IJ tunneled dialysis catheter  - HD prescription - Na 138, bicarb 30, F160, time 3 5 hours, EDW 87,   - HD on 12/30 due to holiday  - next dialysis Thursday  - f/u final blood cultures - may need line removal or exchanged  - RUE edema - unclear etiology  On hep gtt already  Infiltration? Less likely as PTT elevated  Consider duplex     2) electrolytes - stable     3) trop elevation     - per Primary Team and Cardiology team  - was on hep gtt  - f/u ECHO - EF 55-60, mild hypokinesis of entire inferior wall, possible hypokinesis of basal-mid inferolateral walls, grade I dCHF     4) anemia - Hb above goal     5) HTN     - monitor for hypotension     6) MBD -  Cont phos binder and hecterol     7) influenza - tamiflu dose adjusted for renal dosing    8) bacteremia    - cultures from admission positive for GPC  - ID on board  - started on vancomycin    SUBJECTIVE / INTERVAL HISTORY:    Pt weak  Edema RUE       OBJECTIVE:  Current Weight: Weight - Scale: 88 kg (194 lb 0 1 oz)  Vitals:    12/30/19 1500 12/30/19 1925 12/30/19 2209 12/31/19 0700   BP: 133/50 137/58 137/59 151/57   BP Location:    Right arm   Pulse: 72 73 73 76   Resp: 18 18 18 18   Temp: 98 4 °F (36 9 °C) 98 8 °F (37 1 °C) 99 1 °F (37 3 °C) 99 °F (37 2 °C)   TempSrc: Oral Oral Oral Oral   SpO2: 100% 98% 98% 99%   Weight:           Intake/Output Summary (Last 24 hours) at 12/31/2019 1155  Last data filed at 12/31/2019 0501  Gross per 24 hour   Intake 486 38 ml   Output    Net 486 38 ml     General: NAD  Skin: no rash  Eyes: anicteric sclera  ENT: moist mucous membrane  Neck: supple  Chest: diminished air intake b/l  CVS: s1s2, no murmur, no gallop, no rub  Abdomen: soft, nontender, nl sounds  Extremities: edema trace LE  RUE >LUE edema  : no ambrosio  Neuro: AAOX3  Psych: normal affect    Medications:    Current Facility-Administered Medications:     acetaminophen (TYLENOL) tablet 650 mg, 650 mg, Oral, Q6H PRN, Ada Se, DO, 650 mg at 12/30/19 1011    amLODIPine (NORVASC) tablet 10 mg, 10 mg, Oral, Daily, Seth Laughlin MD, 10 mg at 12/31/19 1359    aspirin chewable tablet 81 mg, 81 mg, Oral, Daily, Ada Se, DO, 81 mg at 12/31/19 0912    atorvastatin (LIPITOR) tablet 40 mg, 40 mg, Oral, QPM, Ada Se, DO, Stopped at 12/29/19 2142    bisacodyl (DULCOLAX) EC tablet 5 mg, 5 mg, Oral, Daily PRN, Ada Se, DO, 5 mg at 12/30/19 1153    busPIRone (BUSPAR) tablet 5 mg, 5 mg, Oral, TID, Ada Se, DO, 5 mg at 12/31/19 0912    ceFAZolin (ANCEF) IVPB (premix) 1,000 mg, 1,000 mg, Intravenous, Q24H, Gris Mcclure MD, Last Rate: 100 mL/hr at 12/30/19 1940, 1,000 mg at 12/30/19 1940    docusate sodium (COLACE) capsule 100 mg, 100 mg, Oral, BID, Ada Se, DO, 100 mg at 12/31/19 6289    doxercalciferol (HECTOROL) injection 1 mcg, 1 mcg, Intravenous, After Dialysis, Ada Se, DO, 1 mcg at 12/30/19 0926    fentaNYL (DURAGESIC) 25 mcg/hr TD 72 hr patch 1 patch, 1 patch, Transdermal, Q72H, Ada Se, DO, 1 patch at 12/29/19 2212    heparin (porcine) 25,000 units in 250 mL infusion (premix), 3-20 Units/kg/hr (Order-Specific), Intravenous, Titrated, Ada Se, DO, Last Rate: 11 8 mL/hr at 12/31/19 0922, 13 1 Units/kg/hr at 12/31/19 1898    heparin (porcine) injection 2,000 Units, 2,000 Units, Intravenous, PRN, Jackjevonn Cocking Jack, , 2,000 Units at 12/29/19 1754    heparin (porcine) injection 4,000 Units, 4,000 Units, Intravenous, PRN, Dionna Wright DO    ketorolac (ACULAR) 0 5 % ophthalmic solution 1 drop, 1 drop, Both Eyes, 4x Daily, Dionna Wright DO, 1 drop at 12/31/19 0913    metoprolol tartrate (LOPRESSOR) tablet 25 mg, 25 mg, Oral, TID, Jacinta Leggett MD, 25 mg at 12/31/19 0912    nitroglycerin (NITROSTAT) SL tablet 0 4 mg, 0 4 mg, Sublingual, Q5 Min PRN, Dionna Wright DO    oseltamivir (TAMIFLU) capsule 30 mg, 30 mg, Oral, Once, Dionna DO Kyle    oseltamivir (TAMIFLU) capsule 30 mg, 30 mg, Oral, After Dialysis, Maciel Sadler MD    polyethylene glycol [facility]) packet 17 g, 17 g, Oral, Daily, Dionna Wright DO, 17 g at 12/31/19 0911    sertraline (ZOLOFT) tablet 50 mg, 50 mg, Oral, Daily, Dionna Wright DO, 50 mg at 12/31/19 2029    sevelamer (RENAGEL) tablet 800 mg, 800 mg, Oral, TID With Meals, Dionna Wright DO, 800 mg at 12/31/19 0912    vancomycin (VANCOCIN) IVPB (premix) 750 mg, 10 mg/kg (Adjusted), Intravenous, After Dialysis, TIANA Villasenor    Laboratory Results:  Results from last 7 days   Lab Units 12/31/19  1001 12/30/19  0837 12/29/19  0849 12/28/19  0839 12/27/19  0431 12/26/19  1619 12/26/19  1031   WBC Thousand/uL 5 69 4 37 9 65 7 36  --  6 76  --    HEMOGLOBIN g/dL 10 2* 10 6* 11 3* 10 2*  --  10 3*  --    HEMATOCRIT % 32 6* 33 8* 37 8 34 2*  --  34 5*  --    PLATELETS Thousands/uL 127* 133* 147* 137*  --  146*  154  --    POTASSIUM mmol/L 3 4* 3 7 4 6 4 1 4 8  --  4 1   CHLORIDE mmol/L 99* 99* 101 101 107  --  107   CO2 mmol/L 23 25 22 24 24  --  19*   BUN mg/dL 36* 48* 68* 49* 66*  --  57*   CREATININE mg/dL 4 42* 5 06* 7 25* 5 88* 7 59*  --  6 28*   CALCIUM mg/dL 7 7* 7 7* 8 3 8 2* 8 7  --  8 0*   MAGNESIUM mg/dL  --   --  2 5  --   --   --   --

## 2019-12-31 NOTE — ASSESSMENT & PLAN NOTE
· Right arm swelling noted on exam- likely IV infiltration  · No clot noted on venous duplex of right arm

## 2019-12-31 NOTE — NURSING NOTE
Speaking with ID Dr Susan Morton wanted to get a new set of blood cultures since the second set came back positive for gram positive cocci in clusters, the pt  Was refusing getting them  Called Dr Susan Morton back and she spoke with the patient and informed her it was important we collect the blood cultures  Pt  Ultimately refused and per ID Dr Susan Morton said to give the vanco anyway and to try again with getting the blood cultures with AM labs  Will continue to monitor

## 2019-12-31 NOTE — ASSESSMENT & PLAN NOTE
· Anticoagulation - heparin drip for now  · Anti-platelet -  Aspirin  · Beta-blocker - continue labetalol  · Statin - continue atorvastatin  · Nitrate - p r n  Nitroglycerin  · Evaluation -   · Cardiology consultation appreciated  · Echocardiogram: Ejection fraction was estimated in the range of 55 % to 60 %  There was mild hypokinesis of the entire inferior wall(s)  There was possible hypokinesis of the basal-mid inferolateral wall(s)  grade 1 diastolic dysfunction  · Trops elevated with max 2 19, EKG without ischemic change  · Monitor for chest pain symptoms  Currently patient denies any chest pain symptoms

## 2019-12-31 NOTE — ASSESSMENT & PLAN NOTE
· 2/2 blood cultures bottles staph epidermidis  · ID consult appreciated: continue vancomycin IV   · Repeat blood cultures were requested but unfortunately patient initially refused, and they were only obtained after patient had already received antibiotics (sent on December 31st)  As such it is unclear whether these will even be accurate  Nonetheless per my discussion with Infectious Disease she will require full treatment for 14 days with Vanco to be dosed post dialysis  If her repeat blood cultures do come back positive, we will have to discuss whether her new PermCath has to be removed but I am told at this point it can remain

## 2019-12-31 NOTE — ASSESSMENT & PLAN NOTE
· Arrived with shortness of breath from dialysis center  · Influenza A (+), CXR negative  · Tamiflu dosed after HD   · Mucinex BID, Tessalon Perles  · Monitor temperatures - Tylenol as needed  · P r n  Robitussin DM  · Xopenex nebulizers t i d  for wheezing  · Supportive care

## 2019-12-31 NOTE — PLAN OF CARE
Problem: Potential for Falls  Goal: Patient will remain free of falls  Description  INTERVENTIONS:  - Assess patient frequently for physical needs  -  Identify cognitive and physical deficits and behaviors that affect risk of falls    -  [place] fall precautions as indicated by assessment   - Educate patient/family on patient safety including physical limitations  - Instruct patient to call for assistance with activity based on assessment  - Modify environment to reduce risk of injury  - Consider OT/PT consult to assist with strengthening/mobility  Outcome: Progressing     Problem: Prexisting or High Potential for Compromised Skin Integrity  Goal: Skin integrity is maintained or improved  Description  INTERVENTIONS:  - Identify patients at risk for skin breakdown  - Assess and monitor skin integrity  - Assess and monitor nutrition and hydration status  - Monitor labs   - Assess for incontinence   - Turn and reposition patient  - Assist with mobility/ambulation  - Relieve pressure over bony prominences  - Avoid friction and shearing  - Provide appropriate hygiene as needed including keeping skin clean and dry  - Evaluate need for skin moisturizer/barrier cream  - Collaborate with interdisciplinary team   - Patient/family teaching  - Consider wound care consult   Outcome: Progressing     Problem: METABOLIC, FLUID AND ELECTROLYTES - ADULT  Goal: Electrolytes maintained within normal limits  Description  INTERVENTIONS:  - Monitor labs and assess patient for signs and symptoms of electrolyte imbalances  - Administer electrolyte replacement as ordered  - Monitor response to electrolyte replacements, including repeat lab results as appropriate  - Instruct patient on fluid and nutrition as appropriate  Outcome: Progressing  Goal: Fluid balance maintained  Description  INTERVENTIONS:  - Monitor labs   - Monitor I/O and WT  - Instruct patient on fluid and nutrition as appropriate  - Assess for signs & symptoms of volume excess or deficit  Outcome: Progressing     Problem: Nutrition/Hydration-ADULT  Goal: Nutrient/Hydration intake appropriate for improving, restoring or maintaining nutritional needs  Description  Monitor and assess patient's nutrition/hydration status for malnutrition  Collaborate with interdisciplinary team and initiate plan and interventions as ordered  Monitor patient's weight and dietary intake as ordered or per policy  Utilize nutrition screening tool and intervene as necessary  Determine patient's food preferences and provide high-protein, high-caloric foods as appropriate       INTERVENTIONS:  - Monitor oral intake, urinary output, labs, and treatment plans  - Assess nutrition and hydration status and recommend course of action  - Evaluate amount of meals eaten  - Assist patient with eating if necessary   - Allow adequate time for meals  - Recommend/ encourage appropriate diets, oral nutritional supplements, and vitamin/mineral supplements  - Order, calculate, and assess calorie counts as needed  - Recommend, monitor, and adjust tube feedings and TPN/PPN based on assessed needs  - Assess need for intravenous fluids  - Provide specific nutrition/hydration education as appropriate  - Include patient/family/caregiver in decisions related to nutrition  Outcome: Progressing

## 2019-12-31 NOTE — ASSESSMENT & PLAN NOTE
· VBS during previous hospitalization recommended modified diet  · SLP evaluated and recommend Dysphagia level 2 with nectar thickened liquids  · Aspiration precautions  · Continue to monitor

## 2019-12-31 NOTE — PROGRESS NOTES
Vancomycin IV Pharmacy-to-Dose Consultation    Yamini Riddle is a 81 y o  female who is currently receiving Vancomycin IV with management by the Pharmacy Consult service  Assessment/Plan:  The patient was reviewed  Renal function is stable and no signs or symptoms of nephrotoxicity and/or infusion reactions were documented in the chart  Based on todays assessment, continue current vancomycin (day # 2) dosing of 750 mg after each dialysis, with a plan for random to be drawn prior to HD on 1/2  We will continue to follow the patients culture results and clinical progress daily      Manuel Francois, Pharmacist

## 2019-12-31 NOTE — PROGRESS NOTES
Progress Note - Cassidy Dose 1935, 81 y o  female MRN: [id number]    Unit/Bed#: S -01 Encounter: [id number]    Primary Care Provider: Mima Baptiste MD   Date and time admitted to hospital: 12/29/2019  7:21 AM        * Influenza A  Assessment & Plan  · Arrived with shortness of breath from dialysis center  · Influenza A (+), CXR negative  · Tamiflu dosed after HD   · Mucinex BID, Tessalon Perles  · Monitor temperatures - Tylenol as needed  · P r n  Robitussin DM  · Xopenex nebulizers t i d  for wheezing  · Supportive care  Gram-positive bacteremia  Assessment & Plan  · 2/2 blood cultures bottles + gram + cocci in clusters   · ID consult appreciated: started on vancomycin IV and ancef   · Pt refused repeat blood cultures yesterday and vanco was initiated  Pt was agreeable to repeat blood cultures this am and they have been obtained and sent     Elevated troponin, rule out NSTEMI  Assessment & Plan  · Anticoagulation - heparin drip for now  · Anti-platelet -  Aspirin  · Beta-blocker - continue labetalol  · Statin - continue atorvastatin  · Nitrate - p r n  Nitroglycerin  · Evaluation -   · Cardiology consultation appreciated  · Echocardiogram: Ejection fraction was estimated in the range of 55 % to 60 %  There was mild hypokinesis of the entire inferior wall(s)  There was possible hypokinesis of the basal-mid inferolateral wall(s)  grade 1 diastolic dysfunction  · Trops elevated with max 2 19, EKG without ischemic change  · Monitor for chest pain symptoms  Currently patient denies any chest pain symptoms      Swelling of extremity  Assessment & Plan  · Right arm swelling noted on exam- likely IV infiltration  · Check venous duplex of right arm     Thrombocytopenia (HCC)  Assessment & Plan  · plt count 127- monitor     Oropharyngeal dysphagia  Assessment & Plan  · VBS during previous hospitalization recommended modified diet  · SLP evaluated and recommend Dysphagia level 2 with nectar thickened liquids  · Aspiration precautions  · Continue to monitor    Continuous opioid dependence ([address] )  Assessment & Plan  · Continue home medications  Chronic Aphasia / Cognitive impairment  Assessment & Plan  · Supportive care  · Patient is a limited historian  Essential hypertension  Assessment & Plan  · Continue medications/stable  · Monitor blood pressures  ESRD on hemodialysis [facility])  Assessment & Plan  · Nephrology managing  · HD TTS    VTE Pharmacologic Prophylaxis:   Pharmacologic: Heparin Drip  Mechanical VTE Prophylaxis in Place: Yes    Patient Centered Rounds: I have performed bedside rounds with nursing staff today  Discussions with Specialists or Other Care Team Provider: d/w RN     Education and Discussions with Family / Patient: d/w patient and daughter at bedside  Time Spent for Care: 30 minutes  More than 50% of total time spent on counseling and coordination of care as described above  Current Length of Stay: 2 day(s)    Current Patient Status: Inpatient   Certification Statement: The patient will continue to require additional inpatient hospital stay due to iv heparin, IV vanco,pendign cultures    Discharge Plan: iv abxs, pending cultures  Code Status: Level 3 - DNAR and DNI      Subjective:   Pt reports she feels tired and overall just doesn't feel well  Poor historian  Objective:     Vitals:   Temp (24hrs), Av 8 °F (37 1 °C), Min:98 4 °F (36 9 °C), Max:99 1 °F (37 3 °C)    Temp:  [98 4 °F (36 9 °C)-99 1 °F (37 3 °C)] 99 °F (37 2 °C)  HR:  [72-76] 76  Resp:  [18] 18  BP: (133-151)/(50-59) 151/57  SpO2:  [98 %-100 %] 99 %  Body mass index is 34 37 kg/m²  Input and Output Summary (last 24 hours): Intake/Output Summary (Last 24 hours) at 2019 1355  Last data filed at 2019 0501  Gross per 24 hour   Intake 486 38 ml   Output    Net 486 38 ml       Physical Exam:     Physical Exam   Constitutional: No distress  Neck: Neck supple  Cardiovascular: Normal rate, regular rhythm, normal heart sounds and intact distal pulses  No murmur heard  Pulmonary/Chest: No respiratory distress  She has wheezes  She has no rales  Poor effort, diminished lower lobes    Abdominal: Soft  Bowel sounds are normal  She exhibits no distension  There is no tenderness  There is no rebound  Musculoskeletal: She exhibits edema (mild b/l lower extremities  right arm swelling noted )  She exhibits no tenderness  Neurological: She is alert  Skin: Skin is warm and dry  No rash noted  She is not diaphoretic  No erythema  Psychiatric: She has a normal mood and affect  Additional Data:     Labs:    Results from last 7 days   Lab Units 12/31/19  1001   WBC Thousand/uL 5 69   HEMOGLOBIN g/dL 10 2*   HEMATOCRIT % 32 6*   PLATELETS Thousands/uL 127*   NEUTROS PCT % 59   LYMPHS PCT % 18   MONOS PCT % 20*   EOS PCT % 3     Results from last 7 days   Lab Units 12/31/19  1001  12/29/19  0849   SODIUM mmol/L 136   < > 139   POTASSIUM mmol/L 3 4*   < > 4 6   CHLORIDE mmol/L 99*   < > 101   CO2 mmol/L 23   < > 22   BUN mg/dL 36*   < > 68*   CREATININE mg/dL 4 42*   < > 7 25*   ANION GAP mmol/L 14*   < > 16*   CALCIUM mg/dL 7 7*   < > 8 3   ALBUMIN g/dL  --   --  3 5   TOTAL BILIRUBIN mg/dL  --   --  0 73   ALK PHOS U/L  --   --  103   ALT U/L  --   --  57   AST U/L  --   --  58*   GLUCOSE RANDOM mg/dL 128   < > 141*    < > = values in this interval not displayed  Results from last 7 days   Lab Units 12/29/19  0849   INR  1 20*             Results from last 7 days   Lab Units 12/29/19  0849   LACTIC ACID mmol/L 0 9           * I Have Reviewed All Lab Data Listed Above  * Additional Pertinent Lab Tests Reviewed:  All Labs Within Last 24 Hours Reviewed    Imaging:    Imaging Reports Reviewed Today Include: chest xray     Recent Cultures (last 7 days):     Results from last 7 days   Lab Units 12/29/19  0907 12/29/19  0849   GRAM STAIN RESULT  Gram positive cocci in clusters* Gram positive cocci in clusters*       Last 24 Hours Medication List:     Current Facility-Administered Medications:  acetaminophen 650 mg Oral Q6H PRN Whitaker Teddy, DO    amLODIPine 10 mg Oral Daily Eloy Lloyd MD    aspirin 81 mg Oral Daily Whitaker Teddy, DO    atorvastatin 40 mg Oral QPM Whitaker Teddy, DO    bisacodyl 5 mg Oral Daily PRN Whitaker Teddy, DO    busPIRone 5 mg Oral TID Whitaker Teddy, DO    cefazolin 1,000 mg Intravenous Q24H Adam Grier MD Last Rate: 1,000 mg (12/30/19 1940)   docusate sodium 100 mg Oral BID Whitaker Teddy, DO    doxercalciferol 1 mcg Intravenous After Dialysis Whitaker Teddy, DO    fentaNYL 1 patch Transdermal Q72H Whitaker Teddy, DO    heparin (porcine) 3-20 Units/kg/hr (Order-Specific) Intravenous Titrated Whitaker Teddy, DO Last Rate: Stopped (12/31/19 1205)   heparin (porcine) 2,000 Units Intravenous PRN Whitaker Teddy, DO    heparin (porcine) 4,000 Units Intravenous PRN Whitaker Teddy, DO    ketorolac 1 drop Both Eyes 4x Daily Whitaker Teddy, DO    metoprolol tartrate 25 mg Oral TID Erasto Ferreira MD    nitroglycerin 0 4 mg Sublingual Q5 Min PRN Whitaker Teddy, DO    ondansetron 4 mg Intravenous Q6H PRN TIANA Crawford    oseltamivir 30 mg Oral Once Whitaker Teddy, DO    oseltamivir 30 mg Oral After Dialysis Eloy Lloyd MD    polyethylene glycol 17 g Oral Daily Whitaker Teddy, DO    sertraline 50 mg Oral Daily Whitaker Teddy, DO    sevelamer 800 mg Oral TID With Meals Whitaker Teddy, DO    vancomycin 10 mg/kg (Adjusted) Intravenous After Dialysis TIANA Corley         Today, Patient Was Seen By: TIANA Crawford    ** Please Note: Dictation voice to text software may have been used in the creation of this document   **

## 2019-12-31 NOTE — ASSESSMENT & PLAN NOTE
· Arrived with shortness of breath from dialysis center  · Influenza A (+), CXR negative  · Tamiflu dosed after HD   · Mucinex BID, Tessalon Perles  · P r n  Robitussin DM  · Xopenex nebulizers for wheezing

## 2019-12-31 NOTE — SPEECH THERAPY NOTE
Speech Language/Pathology    Speech/Language Pathology Progress Note    Patient Name: Talya PELAYO Date: 12/31/2019       Subjective:  Pt awake/alert, positioned upright in bed  Daughter present and reports decreased po intake  Mild wheezing noted at rest  No cough at rest      Objective:  Pt seen for dysphagia follow up  Assessed tolerance of current diet at lunch w/ finely chopped meatloaf w/ gravy, mashed potatoes, NTL by straw, and possible advancement w/ thin liquids by straw  Pt dependent for feeding 2/2 overall weakness  Prolonged mastication of soft solid w/ oral holding vs  delayed swallow initiation  Suspect reduced oral control w/ thin liquids by straw  Laryngeal rise present to palpation  Facial grimacing upon swallow initiation regardless of consistency/food/liquid presented  Difficult to discern cause as pt is a poor historian; inconsistently endorses pain  Inconsistent wet cough with audible/gulping noise following thin liquids  No overt s/s aspiration following NTL  Assessment:  Pt consuming dysphagia level 2 w/ NTL w/o overt s/s aspiration, however continues to remain at risk for aspiration w/ thin liquids given wet coughing episodes and audible/gulping swallow  Facial grimacing upon swallow w/ all consistencies, suspect may be secondary to sore throat as pt with persistent cough yesterday       Plan/Recommendations:  Cont dysphagia level 2 w/ NTL  Meds crushed in puree  Aspiration precautions (small bites/sips, fully upright, slow rate)  Will cont to follow; can consider VBS if dysphagia symptoms persist after flu symptoms have resolved

## 2020-01-01 LAB
ANION GAP SERPL CALCULATED.3IONS-SCNC: 12 MMOL/L (ref 4–13)
ATRIAL RATE: 72 BPM
ATRIAL RATE: 76 BPM
BACTERIA BLD CULT: ABNORMAL
BACTERIA BLD CULT: ABNORMAL
BUN SERPL-MCNC: 45 MG/DL (ref 5–25)
CALCIUM SERPL-MCNC: 7.7 MG/DL (ref 8.3–10.1)
CHLORIDE SERPL-SCNC: 99 MMOL/L (ref 100–108)
CO2 SERPL-SCNC: 24 MMOL/L (ref 21–32)
CREAT SERPL-MCNC: 5.52 MG/DL (ref 0.6–1.3)
ERYTHROCYTE [DISTWIDTH] IN BLOOD BY AUTOMATED COUNT: 15.9 % (ref 11.6–15.1)
GFR SERPL CREATININE-BSD FRML MDRD: 8 ML/MIN/1.73SQ M
GLUCOSE SERPL-MCNC: 97 MG/DL (ref 65–140)
GRAM STN SPEC: ABNORMAL
GRAM STN SPEC: ABNORMAL
HCT VFR BLD AUTO: 31 % (ref 34.8–46.1)
HGB BLD-MCNC: 9.7 G/DL (ref 11.5–15.4)
MCH RBC QN AUTO: 27.8 PG (ref 26.8–34.3)
MCHC RBC AUTO-ENTMCNC: 31.3 G/DL (ref 31.4–37.4)
MCV RBC AUTO: 89 FL (ref 82–98)
P AXIS: 71 DEGREES
P AXIS: 75 DEGREES
PLATELET # BLD AUTO: 129 THOUSANDS/UL (ref 149–390)
PMV BLD AUTO: 11 FL (ref 8.9–12.7)
POTASSIUM SERPL-SCNC: 3.9 MMOL/L (ref 3.5–5.3)
PR INTERVAL: 150 MS
PR INTERVAL: 156 MS
QRS AXIS: -10 DEGREES
QRS AXIS: -5 DEGREES
QRSD INTERVAL: 88 MS
QRSD INTERVAL: 94 MS
QT INTERVAL: 478 MS
QT INTERVAL: 496 MS
QTC INTERVAL: 537 MS
QTC INTERVAL: 543 MS
RBC # BLD AUTO: 3.49 MILLION/UL (ref 3.81–5.12)
SODIUM SERPL-SCNC: 135 MMOL/L (ref 136–145)
T WAVE AXIS: -1 DEGREES
T WAVE AXIS: -10 DEGREES
VENTRICULAR RATE: 72 BPM
VENTRICULAR RATE: 76 BPM
WBC # BLD AUTO: 5.08 THOUSAND/UL (ref 4.31–10.16)

## 2020-01-01 PROCEDURE — 94760 N-INVAS EAR/PLS OXIMETRY 1: CPT

## 2020-01-01 PROCEDURE — 85027 COMPLETE CBC AUTOMATED: CPT | Performed by: INTERNAL MEDICINE

## 2020-01-01 PROCEDURE — 99233 SBSQ HOSP IP/OBS HIGH 50: CPT | Performed by: INTERNAL MEDICINE

## 2020-01-01 PROCEDURE — 94640 AIRWAY INHALATION TREATMENT: CPT

## 2020-01-01 PROCEDURE — 93010 ELECTROCARDIOGRAM REPORT: CPT | Performed by: INTERNAL MEDICINE

## 2020-01-01 PROCEDURE — 99232 SBSQ HOSP IP/OBS MODERATE 35: CPT | Performed by: PHYSICIAN ASSISTANT

## 2020-01-01 PROCEDURE — 80048 BASIC METABOLIC PNL TOTAL CA: CPT | Performed by: INTERNAL MEDICINE

## 2020-01-01 RX ORDER — BENZONATATE 100 MG/1
100 CAPSULE ORAL 3 TIMES DAILY PRN
Status: DISCONTINUED | OUTPATIENT
Start: 2020-01-01 | End: 2020-01-08 | Stop reason: HOSPADM

## 2020-01-01 RX ORDER — CEFAZOLIN SODIUM 2 G/50ML
2000 SOLUTION INTRAVENOUS
Status: DISCONTINUED | OUTPATIENT
Start: 2020-01-02 | End: 2020-01-04

## 2020-01-01 RX ORDER — LEVALBUTEROL 1.25 MG/.5ML
1.25 SOLUTION, CONCENTRATE RESPIRATORY (INHALATION) EVERY 6 HOURS
Status: DISCONTINUED | OUTPATIENT
Start: 2020-01-01 | End: 2020-01-04

## 2020-01-01 RX ORDER — LEVALBUTEROL INHALATION SOLUTION 0.63 MG/3ML
0.63 SOLUTION RESPIRATORY (INHALATION) EVERY 6 HOURS PRN
Status: DISCONTINUED | OUTPATIENT
Start: 2020-01-01 | End: 2020-01-01

## 2020-01-01 RX ORDER — HEPARIN SODIUM 5000 [USP'U]/ML
5000 INJECTION, SOLUTION INTRAVENOUS; SUBCUTANEOUS EVERY 8 HOURS SCHEDULED
Status: DISCONTINUED | OUTPATIENT
Start: 2020-01-01 | End: 2020-01-08 | Stop reason: HOSPADM

## 2020-01-01 RX ORDER — LEVALBUTEROL INHALATION SOLUTION 0.63 MG/3ML
SOLUTION RESPIRATORY (INHALATION)
Status: COMPLETED
Start: 2020-01-01 | End: 2020-01-01

## 2020-01-01 RX ORDER — ALBUTEROL SULFATE 2.5 MG/3ML
2.5 SOLUTION RESPIRATORY (INHALATION) EVERY 6 HOURS PRN
Status: DISCONTINUED | OUTPATIENT
Start: 2020-01-01 | End: 2020-01-08 | Stop reason: HOSPADM

## 2020-01-01 RX ADMIN — LEVALBUTEROL HYDROCHLORIDE 1.25 MG: 1.25 SOLUTION, CONCENTRATE RESPIRATORY (INHALATION) at 19:53

## 2020-01-01 RX ADMIN — METOPROLOL TARTRATE 50 MG: 50 TABLET, FILM COATED ORAL at 21:22

## 2020-01-01 RX ADMIN — IPRATROPIUM BROMIDE 0.5 MG: 0.5 SOLUTION RESPIRATORY (INHALATION) at 13:26

## 2020-01-01 RX ADMIN — SEVELAMER HYDROCHLORIDE 800 MG: 800 TABLET, FILM COATED PARENTERAL at 18:16

## 2020-01-01 RX ADMIN — LEVALBUTEROL INHALATION SOLUTION 0.63 MG: 0.63 SOLUTION RESPIRATORY (INHALATION) at 09:10

## 2020-01-01 RX ADMIN — KETOROLAC TROMETHAMINE 1 DROP: 5 SOLUTION OPHTHALMIC at 08:43

## 2020-01-01 RX ADMIN — BUSPIRONE HYDROCHLORIDE 5 MG: 5 TABLET ORAL at 21:22

## 2020-01-01 RX ADMIN — HEPARIN SODIUM 5000 UNITS: 5000 INJECTION INTRAVENOUS; SUBCUTANEOUS at 21:22

## 2020-01-01 RX ADMIN — KETOROLAC TROMETHAMINE 1 DROP: 5 SOLUTION OPHTHALMIC at 21:22

## 2020-01-01 RX ADMIN — LEVALBUTEROL HYDROCHLORIDE 1.25 MG: 1.25 SOLUTION, CONCENTRATE RESPIRATORY (INHALATION) at 13:26

## 2020-01-01 RX ADMIN — FENTANYL 1 PATCH: 25 PATCH TRANSDERMAL at 19:56

## 2020-01-01 RX ADMIN — IPRATROPIUM BROMIDE 0.5 MG: 0.5 SOLUTION RESPIRATORY (INHALATION) at 19:53

## 2020-01-01 RX ADMIN — HEPARIN SODIUM 5000 UNITS: 5000 INJECTION INTRAVENOUS; SUBCUTANEOUS at 13:07

## 2020-01-01 RX ADMIN — LEVALBUTEROL HYDROCHLORIDE 0.63 MG: 0.63 SOLUTION RESPIRATORY (INHALATION) at 09:10

## 2020-01-01 RX ADMIN — KETOROLAC TROMETHAMINE 1 DROP: 5 SOLUTION OPHTHALMIC at 18:15

## 2020-01-01 RX ADMIN — BUSPIRONE HYDROCHLORIDE 5 MG: 5 TABLET ORAL at 08:42

## 2020-01-01 RX ADMIN — ATORVASTATIN CALCIUM 40 MG: 40 TABLET, FILM COATED ORAL at 18:15

## 2020-01-01 RX ADMIN — SEVELAMER HYDROCHLORIDE 800 MG: 800 TABLET, FILM COATED PARENTERAL at 08:43

## 2020-01-01 RX ADMIN — ASPIRIN 81 MG 81 MG: 81 TABLET ORAL at 08:42

## 2020-01-01 RX ADMIN — METOPROLOL TARTRATE 50 MG: 50 TABLET, FILM COATED ORAL at 08:42

## 2020-01-01 RX ADMIN — BUSPIRONE HYDROCHLORIDE 5 MG: 5 TABLET ORAL at 18:15

## 2020-01-01 RX ADMIN — SERTRALINE HYDROCHLORIDE 50 MG: 50 TABLET ORAL at 08:42

## 2020-01-01 RX ADMIN — AMLODIPINE BESYLATE 10 MG: 10 TABLET ORAL at 08:42

## 2020-01-01 RX ADMIN — KETOROLAC TROMETHAMINE 1 DROP: 5 SOLUTION OPHTHALMIC at 11:14

## 2020-01-01 NOTE — RESPIRATORY THERAPY NOTE
RT Protocol Note  [facility] 81 y o  female MRN: [id number]  Unit/Bed#: S -01 Encounter: [id number]    Assessment    Principal Problem:    Influenza A  Active Problems:    ESRD on hemodialysis ([facility] )    Essential hypertension    Chronic Aphasia / Cognitive impairment    Continuous opioid dependence (HCC)    Elevated troponin, rule out NSTEMI    Oropharyngeal dysphagia    Gram-positive bacteremia    Thrombocytopenia (HCC)    Swelling of extremity      Home Pulmonary Medications:  Albuterol PRN       Past Medical History:   Diagnosis Date    Altered mental status, unspecified     Anemia     Atherosclerotic heart disease of native coronary artery without angina pectoris     Cancer ([facility])     Chest pain     Chronic diastolic (congestive) heart failure (HCC)     Dependence [facility] dialysis ([facility] )     Diabetes mellitus ([facility] )     End-stage renal disease ([facility] )     Gastro-esophageal reflux disease without esophagitis     Hyperlipidemia     Hypertension     Long term current use of anticoagulant     Long term current use of insulin ([facility])     Muscle weakness     Nausea with vomiting     Other abnormalities of gait and mobility     Other specified abnormal findings of blood chemistry     Type 2 diabetes mellitus ([facility])     Ventral hernia without obstruction or gangrene     Vitamin D deficiency      Social History     Socioeconomic History    Marital status:      Spouse name: None    Number of children: None    Years of education: None    Highest education level: None   Occupational History    None   Social Needs    Financial resource strain: None    Food insecurity:     Worry: None     Inability: None    Transportation needs:     Medical: None     Non-medical: None   Tobacco Use    Smoking status: Never Smoker    Smokeless tobacco: Never Used   Substance and Sexual Activity    Alcohol use: No    Drug use: No    Sexual activity: None   Lifestyle    Physical activity:     Days per week: None     Minutes per session: None    Stress: None   Relationships    Social connections:     Talks on phone: None     Gets together: None     Attends [employer] service: None     Active member of club or organization: None     Attends meetings of clubs or organizations: None     Relationship status: None    Intimate partner violence:     Fear of current or ex partner: None     Emotionally abused: None     Physically abused: None     Forced sexual activity: None   Other Topics Concern    None   Social History Narrative    None       Subjective         Objective    Physical Exam:   Assessment Type: Pre-treatment  General Appearance: Alert, Awake  Respiratory Pattern: Normal  Chest Assessment: Chest expansion symmetrical  Bilateral Breath Sounds: Diminished, Expiratory wheezes    Vitals:  Blood pressure 134/60, pulse 68, temperature 98 6 °F (37 °C), temperature source Oral, resp  rate 20, height 5' 3" (1 6 m), weight 88 kg (194 lb 0 1 oz), SpO2 93 %  Imaging and other studies: I have personally reviewed pertinent reports  Plan    Respiratory Plan: Mild Distress pathway        Resp Comments: Pt assessed as per respiratory protocol  Pt has dx of bacteremia/flu +  Pt has B/L expiratory wheezing  Will start Q6H txs for wheezing/SOB  Tx given at 0910

## 2020-01-01 NOTE — PLAN OF CARE
Problem: Potential for Falls  Goal: Patient will remain free of falls  Description  INTERVENTIONS:  - Assess patient frequently for physical needs  -  Identify cognitive and physical deficits and behaviors that affect risk of falls    -  [place] fall precautions as indicated by assessment   - Educate patient/family on patient safety including physical limitations  - Instruct patient to call for assistance with activity based on assessment  - Modify environment to reduce risk of injury  - Consider OT/PT consult to assist with strengthening/mobility  Outcome: Progressing     Problem: Prexisting or High Potential for Compromised Skin Integrity  Goal: Skin integrity is maintained or improved  Description  INTERVENTIONS:  - Identify patients at risk for skin breakdown  - Assess and monitor skin integrity  - Assess and monitor nutrition and hydration status  - Monitor labs   - Assess for incontinence   - Turn and reposition patient  - Assist with mobility/ambulation  - Relieve pressure over bony prominences  - Avoid friction and shearing  - Provide appropriate hygiene as needed including keeping skin clean and dry  - Evaluate need for skin moisturizer/barrier cream  - Collaborate with interdisciplinary team   - Patient/family teaching  - Consider wound care consult   Outcome: Progressing     Problem: METABOLIC, FLUID AND ELECTROLYTES - ADULT  Goal: Electrolytes maintained within normal limits  Description  INTERVENTIONS:  - Monitor labs and assess patient for signs and symptoms of electrolyte imbalances  - Administer electrolyte replacement as ordered  - Monitor response to electrolyte replacements, including repeat lab results as appropriate  - Instruct patient on fluid and nutrition as appropriate  Outcome: Progressing  Goal: Fluid balance maintained  Description  INTERVENTIONS:  - Monitor labs   - Monitor I/O and WT  - Instruct patient on fluid and nutrition as appropriate  - Assess for signs & symptoms of volume excess or deficit  Outcome: Progressing     Problem: Nutrition/Hydration-ADULT  Goal: Nutrient/Hydration intake appropriate for improving, restoring or maintaining nutritional needs  Description  Monitor and assess patient's nutrition/hydration status for malnutrition  Collaborate with interdisciplinary team and initiate plan and interventions as ordered  Monitor patient's weight and dietary intake as ordered or per policy  Utilize nutrition screening tool and intervene as necessary  Determine patient's food preferences and provide high-protein, high-caloric foods as appropriate       INTERVENTIONS:  - Monitor oral intake, urinary output, labs, and treatment plans  - Assess nutrition and hydration status and recommend course of action  - Evaluate amount of meals eaten  - Assist patient with eating if necessary   - Allow adequate time for meals  - Recommend/ encourage appropriate diets, oral nutritional supplements, and vitamin/mineral supplements  - Order, calculate, and assess calorie counts as needed  - Recommend, monitor, and adjust tube feedings and TPN/PPN based on assessed needs  - Assess need for intravenous fluids  - Provide specific nutrition/hydration education as appropriate  - Include patient/family/caregiver in decisions related to nutrition  Outcome: Progressing

## 2020-01-01 NOTE — PROGRESS NOTES
[address] [facility] NOTE   Lisa Gutierrez 81 y o  female MRN: [id number]  Unit/Bed#: S -01 Encounter: [id number]  Reason for Consult: ESRD    ASSESSMENT and PLAN:    26-year-old female with a past medical history of ESRD, hypertension who initially presented with shortness of breath on 12/29  Lucille Jack had a recent admission on 12/27 for catheter malfunction which was replaced on 12/27   Patient was sent in with shortness of breath and was found to have influenza positive swab   Nephrology is on board for ESRD      1) ESRD -      - on HD at [facility] [address] is left IJ tunneled dialysis catheter  - HD prescription - Na 138, bicarb 30, F160, time 3 5 hours, EDW 87,   - HD on 12/30 due to holiday  - next dialysis Thursday  - f/u final blood cultures - staph epidermidis  Will need 2 week antibiotic course  F/u repeat cultures on 12/31  - RUE edema - unclear etiology  Duplex unrevealing   - dose vanco with dialysis     2) electrolytes - stable     3) trop elevation     - per Primary Team and Cardiology team  - was on hep gtt  - f/u ECHO - EF 55-60, mild hypokinesis of entire inferior wall, possible hypokinesis of basal-mid inferolateral walls, grade I dCHF     4) anemia - Hb above goal     5) HTN     - monitor for hypotension     6) MBD -  Cont phos binder and hecterol     7) influenza - tamiflu dose adjusted for renal dosing     8) bacteremia     - cultures from admission positive for GPC  - ID on board  - started on vancomycin       SUBJECTIVE / INTERVAL HISTORY:    -148; pt aphasic  Appears comfortable but diff to assess       OBJECTIVE:  Current Weight: Weight - Scale: 88 kg (194 lb 0 1 oz)  Vitals:    12/31/19 1608 12/31/19 2232 01/01/20 0600 01/01/20 0704   BP: 148/66 142/63  134/60   BP Location:  Left arm  Left arm   Pulse: 71 69  68   Resp:  18  20   Temp:  98 9 °F (37 2 °C)  98 6 °F (37 °C)   TempSrc:  Oral  Oral   SpO2:  97%  95%   Weight:   88 kg (194 lb 0 1 oz)    Height: Intake/Output Summary (Last 24 hours) at 1/1/2020 0912  Last data filed at 12/31/2019 1748  Gross per 24 hour   Intake 60 ml   Output    Net 60 ml     General: NAD  Skin: no rash  Eyes: anicteric sclera  ENT: dry mucous membrane  Neck: supple  Chest: diminished air intake b/l  CVS: s1s2, no murmur, no gallop, no rub  Abdomen: soft, nontender, nl sounds  Extremities: no edema LE b/l  : no ambrosio  Neuro: AAOX3, diff to assess  Psych: normal affect, diff to assess    Medications:    Current Facility-Administered Medications:     acetaminophen (TYLENOL) tablet 650 mg, 650 mg, Oral, Q6H PRN, Leveda Katerina, DO, 650 mg at 12/30/19 1011    amLODIPine (NORVASC) tablet 10 mg, 10 mg, Oral, Daily, Codi Petit MD, 10 mg at 01/01/20 9035    aspirin chewable tablet 81 mg, 81 mg, Oral, Daily, Leveda Katerina, DO, 81 mg at 01/01/20 2664    atorvastatin (LIPITOR) tablet 40 mg, 40 mg, Oral, QPM, Leveda Katerina, DO, 40 mg at 12/31/19 1845    benzonatate (TESSALON PERLES) capsule 100 mg, 100 mg, Oral, TID PRN, Christin Johnson PA-C    bisacodyl (DULCOLAX) EC tablet 5 mg, 5 mg, Oral, Daily PRN, Leveda Katerina, DO, 5 mg at 12/30/19 1153    busPIRone (BUSPAR) tablet 5 mg, 5 mg, Oral, TID, Leveda Katerina, DO, 5 mg at 01/01/20 8836    docusate sodium (COLACE) capsule 100 mg, 100 mg, Oral, BID, Leveda Katerina, DO, 100 mg at 12/31/19 1845    doxercalciferol (HECTOROL) injection 1 mcg, 1 mcg, Intravenous, After Dialysis, Leveda Katerina, DO, 1 mcg at 12/30/19 0926    fentaNYL (DURAGESIC) 25 mcg/hr TD 72 hr patch 1 patch, 1 patch, Transdermal, Q72H, Leveda Katerina, DO, 1 patch at 12/29/19 2212    heparin (porcine) subcutaneous injection 5,000 Units, 5,000 Units, Subcutaneous, Q8H [place], Christin Johnson PA-C    ketorolac (ACULAR) 0 5 % ophthalmic solution 1 drop, 1 drop, Both Eyes, 4x Daily, Darcy Borges DO, 1 drop at 01/01/20 0843    levalbuterol (XOPENEX) inhalation solution 0 63 mg, 0 63 mg, Nebulization, Q6H PRN, Christin Johnson PA-C, 0 63 mg at 01/01/20 0910    metoprolol tartrate (LOPRESSOR) tablet 50 mg, 50 mg, Oral, Q12H [facility], Patricia Zavaleta MD, 50 mg at 01/01/20 0842    nitroglycerin (NITROSTAT) SL tablet 0 4 mg, 0 4 mg, Sublingual, Q5 Min PRN, Jeimy Williamson DO    ondansetron [facility]) injection 4 mg, 4 mg, Intravenous, Q6H PRN, TIANA Guzmán, 4 mg at 12/31/19 1318    oseltamivir (TAMIFLU) capsule 30 mg, 30 mg, Oral, Once, Jeimy Williamson DO    oseltamivir (TAMIFLU) capsule 30 mg, 30 mg, Oral, After Dialysis, Araseli Ching MD, 30 mg at 12/31/19 1845    polyethylene glycol (MIRALAX) packet 17 g, 17 g, Oral, Daily, Jeimy Williamson DO, 17 g at 12/31/19 0911    sertraline (ZOLOFT) tablet 50 mg, 50 mg, Oral, Daily, Jeimy Williamson DO, 50 mg at 01/01/20 5439    sevelamer (RENAGEL) tablet 800 mg, 800 mg, Oral, TID With Meals, Jeimy Williamson DO, 800 mg at 01/01/20 0843    vancomycin (VANCOCIN) IVPB (premix) 750 mg, 10 mg/kg (Adjusted), Intravenous, After Dialysis, TIANA Ybarra    Laboratory Results:  Results from last 7 days   Lab Units 01/01/20  0836 12/31/19  1001 12/30/19  0837 12/29/19  0849 12/28/19  0839 12/27/19  0431 12/26/19  1619 12/26/19  1031   WBC Thousand/uL 5 08 5 69 4 37 9 65 7 36  --  6 76  --    HEMOGLOBIN g/dL 9 7* 10 2* 10 6* 11 3* 10 2*  --  10 3*  --    HEMATOCRIT % 31 0* 32 6* 33 8* 37 8 34 2*  --  34 5*  --    PLATELETS Thousands/uL 129* 127* 133* 147* 137*  --  146*  154  --    POTASSIUM mmol/L 3 9 3 4* 3 7 4 6 4 1 4 8  --  4 1   CHLORIDE mmol/L 99* 99* 99* 101 101 107  --  107   CO2 mmol/L 24 23 25 22 24 24  --  19*   BUN mg/dL 45* 36* 48* 68* 49* 66*  --  57*   CREATININE mg/dL 5 52* 4 42* 5 06* 7 25* 5 88* 7 59*  --  6 28*   CALCIUM mg/dL 7 7* 7 7* 7 7* 8 3 8 2* 8 7  --  8 0*   MAGNESIUM mg/dL  --   --   --  2 5  --   --   --   --

## 2020-01-01 NOTE — PLAN OF CARE
Problem: Potential for Falls  Goal: Patient will remain free of falls  Description  INTERVENTIONS:  - Assess patient frequently for physical needs  -  Identify cognitive and physical deficits and behaviors that affect risk of falls    -  [place] fall precautions as indicated by assessment   - Educate patient/family on patient safety including physical limitations  - Instruct patient to call for assistance with activity based on assessment  - Modify environment to reduce risk of injury  - Consider OT/PT consult to assist with strengthening/mobility  Outcome: Progressing     Problem: Prexisting or High Potential for Compromised Skin Integrity  Goal: Skin integrity is maintained or improved  Description  INTERVENTIONS:  - Identify patients at risk for skin breakdown  - Assess and monitor skin integrity  - Assess and monitor nutrition and hydration status  - Monitor labs   - Assess for incontinence   - Turn and reposition patient  - Assist with mobility/ambulation  - Relieve pressure over bony prominences  - Avoid friction and shearing  - Provide appropriate hygiene as needed including keeping skin clean and dry  - Evaluate need for skin moisturizer/barrier cream  - Collaborate with interdisciplinary team   - Patient/family teaching  - Consider wound care consult   Outcome: Progressing     Problem: METABOLIC, FLUID AND ELECTROLYTES - ADULT  Goal: Electrolytes maintained within normal limits  Description  INTERVENTIONS:  - Monitor labs and assess patient for signs and symptoms of electrolyte imbalances  - Administer electrolyte replacement as ordered  - Monitor response to electrolyte replacements, including repeat lab results as appropriate  - Instruct patient on fluid and nutrition as appropriate  Outcome: Progressing  Goal: Fluid balance maintained  Description  INTERVENTIONS:  - Monitor labs   - Monitor I/O and WT  - Instruct patient on fluid and nutrition as appropriate  - Assess for signs & symptoms of volume excess or deficit  Outcome: Progressing     Problem: Nutrition/Hydration-ADULT  Goal: Nutrient/Hydration intake appropriate for improving, restoring or maintaining nutritional needs  Description  Monitor and assess patient's nutrition/hydration status for malnutrition  Collaborate with interdisciplinary team and initiate plan and interventions as ordered  Monitor patient's weight and dietary intake as ordered or per policy  Utilize nutrition screening tool and intervene as necessary  Determine patient's food preferences and provide high-protein, high-caloric foods as appropriate       INTERVENTIONS:  - Monitor oral intake, urinary output, labs, and treatment plans  - Assess nutrition and hydration status and recommend course of action  - Evaluate amount of meals eaten  - Assist patient with eating if necessary   - Allow adequate time for meals  - Recommend/ encourage appropriate diets, oral nutritional supplements, and vitamin/mineral supplements  - Order, calculate, and assess calorie counts as needed  - Recommend, monitor, and adjust tube feedings and TPN/PPN based on assessed needs  - Assess need for intravenous fluids  - Provide specific nutrition/hydration education as appropriate  - Include patient/family/caregiver in decisions related to nutrition  Outcome: Progressing

## 2020-01-01 NOTE — PLAN OF CARE
Problem: Potential for Falls  Goal: Patient will remain free of falls  Description  INTERVENTIONS:  - Assess patient frequently for physical needs  -  Identify cognitive and physical deficits and behaviors that affect risk of falls    -  [place] fall precautions as indicated by assessment   - Educate patient/family on patient safety including physical limitations  - Instruct patient to call for assistance with activity based on assessment  - Modify environment to reduce risk of injury  - Consider OT/PT consult to assist with strengthening/mobility  Outcome: Progressing     Problem: Prexisting or High Potential for Compromised Skin Integrity  Goal: Skin integrity is maintained or improved  Description  INTERVENTIONS:  - Identify patients at risk for skin breakdown  - Assess and monitor skin integrity  - Assess and monitor nutrition and hydration status  - Monitor labs   - Assess for incontinence   - Turn and reposition patient  - Assist with mobility/ambulation  - Relieve pressure over bony prominences  - Avoid friction and shearing  - Provide appropriate hygiene as needed including keeping skin clean and dry  - Evaluate need for skin moisturizer/barrier cream  - Collaborate with interdisciplinary team   - Patient/family teaching  - Consider wound care consult   Outcome: Progressing     Problem: METABOLIC, FLUID AND ELECTROLYTES - ADULT  Goal: Electrolytes maintained within normal limits  Description  INTERVENTIONS:  - Monitor labs and assess patient for signs and symptoms of electrolyte imbalances  - Administer electrolyte replacement as ordered  - Monitor response to electrolyte replacements, including repeat lab results as appropriate  - Instruct patient on fluid and nutrition as appropriate  Outcome: Progressing  Goal: Fluid balance maintained  Description  INTERVENTIONS:  - Monitor labs   - Monitor I/O and WT  - Instruct patient on fluid and nutrition as appropriate  - Assess for signs & symptoms of volume excess or deficit  Outcome: Progressing     Problem: Nutrition/Hydration-ADULT  Goal: Nutrient/Hydration intake appropriate for improving, restoring or maintaining nutritional needs  Description  Monitor and assess patient's nutrition/hydration status for malnutrition  Collaborate with interdisciplinary team and initiate plan and interventions as ordered  Monitor patient's weight and dietary intake as ordered or per policy  Utilize nutrition screening tool and intervene as necessary  Determine patient's food preferences and provide high-protein, high-caloric foods as appropriate       INTERVENTIONS:  - Monitor oral intake, urinary output, labs, and treatment plans  - Assess nutrition and hydration status and recommend course of action  - Evaluate amount of meals eaten  - Assist patient with eating if necessary   - Allow adequate time for meals  - Recommend/ encourage appropriate diets, oral nutritional supplements, and vitamin/mineral supplements  - Order, calculate, and assess calorie counts as needed  - Recommend, monitor, and adjust tube feedings and TPN/PPN based on assessed needs  - Assess need for intravenous fluids  - Provide specific nutrition/hydration education as appropriate  - Include patient/family/caregiver in decisions related to nutrition  Outcome: Progressing

## 2020-01-01 NOTE — PROGRESS NOTES
Progress Note - Socorro Chairez 1935, 81 y o  female MRN: [id number]    Unit/Bed#: S -01 Encounter: [id number]    Primary Care Provider: Zeb Singh MD   Date and time admitted to hospital: 12/29/2019  7:21 AM  * Influenza A  Assessment & Plan  · Arrived with shortness of breath from dialysis center  · Influenza A (+), CXR negative  · Tamiflu dosed after HD   · Mucinex BID, Tessalon Perles  · P r n  Robitussin DM  · Xopenex nebulizers for wheezing  Gram-positive bacteremia  Assessment & Plan  · 2/2 blood cultures bottles staph epidermidis  · ID consult appreciated: continue vancomycin IV   · Repeat blood cultures were requested but unfortunately patient initially refused, and they were only obtained after patient had already received antibiotics (sent on December 31st)  As such it is unclear whether these will even be accurate  Nonetheless per my discussion with Infectious Disease she will require full treatment for 14 days with Vanco to be dosed post dialysis  If her repeat blood cultures do come back positive, we will have to discuss whether her new PermCath has to be removed but I am told at this point it can remain  non MI troponin elevation  Assessment & Plan  · Anticoagulation - heparin drip  Initially, now stopped  · Anti-platelet -  Aspirin  · Beta-blocker - continue labetalol  · Statin - continue atorvastatin  · Nitrate - p r n  Nitroglycerin  · Evaluation -   · Cardiology consultation appreciated  · Echocardiogram: Ejection fraction was estimated in the range of 55 % to 60 %  There was mild hypokinesis of the entire inferior wall(s)  There was possible hypokinesis of the basal-mid inferolateral wall(s)  grade 1 diastolic dysfunction  · Trops elevated with max 2 19, EKG without ischemic change  · Currently patient denies any chest pain symptoms, but poor historian    ESRD on hemodialysis [facility])  Assessment & Plan  · Nephrology managing, appreciate input  · HD TTS, HD holiday on -resume HD tomorrow    Essential hypertension  Assessment & Plan  · Continue medications/stable  · Monitor blood pressures  Chronic Aphasia / Cognitive impairment  Assessment & Plan  · History of stroke  · Supportive care  · Patient is a limited historian  Swelling of extremity  Assessment & Plan  · Right arm swelling noted on exam- likely IV infiltration  · No clot noted on venous duplex of right arm     Oropharyngeal dysphagia  Assessment & Plan  · VBS during previous hospitalization recommended modified diet  · SLP evaluated and recommend Dysphagia level 2 with nectar thickened liquids  · Aspiration precautions  · Continue to monitor    Continuous opioid dependence ([facility] [address] )  Assessment & Plan  · Continue home medications  VTE Pharmacologic Prophylaxis:   Pharmacologic:  Now that she is off heparin drip, start subcu heparin for DVT prophylaxis  Mechanical VTE Prophylaxis in Place: Yes    Patient Centered Rounds: I have performed bedside rounds with nursing staff today  Discussions with Specialists or Other Care Team Provider:  Infectious Disease    Education and Discussions with Family / Patient:  Updated daughter at length over the phone, she also expressed concern about making sure her mom is eating and drinking enough  Time Spent for Care: 30 minutes  More than 50% of total time spent on counseling and coordination of care as described above  Current Length of Stay: 3 day(s)    Current Patient Status: Inpatient   Certification Statement: The patient will continue to require additional inpatient hospital stay due to Awaiting repeat blood cultures    Discharge Plan:  Return to skilled nursing facility once repeat blood cultures have been resulted    Code Status: Level 3 - DNAR and DNI    Subjective:   Patient is aphasic and does not provide any history or answer questions    Nurse does not note any evidence of shortness of breath    Objective:     Vitals:   Temp (24hrs), Av 8 °F (37 1 °C), Min:98 6 °F (37 °C), Max:99 °F (37 2 °C)    Temp:  [98 6 °F (37 °C)-99 °F (37 2 °C)] 98 6 °F (37 °C)  HR:  [68-71] 68  Resp:  [18-20] 20  BP: (134-148)/(60-66) 134/60  SpO2:  [93 %-97 %] 93 %  Body mass index is 34 37 kg/m²  Input and Output Summary (last 24 hours): Intake/Output Summary (Last 24 hours) at 1/1/2020 1158  Last data filed at 12/31/2019 1748  Gross per 24 hour   Intake 60 ml   Output    Net 60 ml       Physical Exam:     Physical Exam   Constitutional: No distress  Elderly female seen lying in bed  HENT:   Head: Atraumatic  Chronic, golf ball size mobile mass in her left neck tissue   Eyes: Right eye exhibits no discharge  Left eye exhibits no discharge  No scleral icterus  Cardiovascular: Normal rate and regular rhythm  Pulmonary/Chest: No respiratory distress  She has wheezes  No dyspnea or distress noted  No cough  Some wheezing noted   Abdominal: Soft  She exhibits no distension  There is no tenderness  There is no guarding  Musculoskeletal: She exhibits edema (Significant bilateral ankle edema as well as right upper extremity edema ) and tenderness (Tenderness at touch of the right arm)  Neurological: She is alert  Awake alert aphasic  Does not provide history   Skin: Skin is warm and dry  No rash noted  She is not diaphoretic  No erythema  No pallor  Psychiatric:   Flat affect, cooperative and calm   Vitals reviewed      Additional Data:     Labs:    Results from last 7 days   Lab Units 01/01/20  0836 12/31/19  1001   WBC Thousand/uL 5 08 5 69   HEMOGLOBIN g/dL 9 7* 10 2*   HEMATOCRIT % 31 0* 32 6*   PLATELETS Thousands/uL 129* 127*   NEUTROS PCT %  --  59   LYMPHS PCT %  --  18   MONOS PCT %  --  20*   EOS PCT %  --  3     Results from last 7 days   Lab Units 01/01/20  0836  12/29/19  0849   SODIUM mmol/L 135*   < > 139   POTASSIUM mmol/L 3 9   < > 4 6   CHLORIDE mmol/L 99*   < > 101   CO2 mmol/L 24   < > 22   BUN mg/dL 45*   < > 68*   CREATININE mg/dL 5 52* < > 7 25*   ANION GAP mmol/L 12   < > 16*   CALCIUM mg/dL 7 7*   < > 8 3   ALBUMIN g/dL  --   --  3 5   TOTAL BILIRUBIN mg/dL  --   --  0 73   ALK PHOS U/L  --   --  103   ALT U/L  --   --  57   AST U/L  --   --  58*   GLUCOSE RANDOM mg/dL 97   < > 141*    < > = values in this interval not displayed  Results from last 7 days   Lab Units 12/29/19  0849   INR  1 20*             Results from last 7 days   Lab Units 12/29/19  0849   LACTIC ACID mmol/L 0 9     * I Have Reviewed All Lab Data Listed Above  * Additional Pertinent Lab Tests Reviewed: Gloria 66 Admission Reviewed    Imaging:    Imaging Reports Reviewed Today Include:   Imaging Personally Reviewed by Myself Includes:      Recent Cultures (last 7 days):     Results from last 7 days   Lab Units 12/31/19  1020 12/31/19  0959 12/29/19  0907 12/29/19  0849   BLOOD CULTURE  Received in Microbiology Lab  Culture in Progress  Received in Microbiology Lab  Culture in Progress   Staphylococcus epidermidis* Staphylococcus epidermidis*   GRAM STAIN RESULT   --   --  Gram positive cocci in clusters* Gram positive cocci in clusters*       Last 24 Hours Medication List:     Current Facility-Administered Medications:  acetaminophen 650 mg Oral Q6H PRN Rosario Jesse, DO   albuterol 2 5 mg Nebulization Q6H PRN Leanne Neville MD   amLODIPine 10 mg Oral Daily Aliza Field MD   aspirin 81 mg Oral Daily Rosario [place], DO   atorvastatin 40 mg Oral QPM Rosario [place], DO   benzonatate 100 mg Oral TID PRN Swapnil Herring PA-C   bisacodyl 5 mg Oral Daily PRN Rosario Jesse, DO   busPIRone 5 mg Oral TID Rosario Jesse, DO   docusate sodium 100 mg Oral BID Rosario [place], DO   doxercalciferol 1 mcg Intravenous After Dialysis Rosario Jesse, DO   fentaNYL 1 patch Transdermal Q72H Rosario [place], DO   heparin (porcine) 5,000 Units Subcutaneous Q8H [address] Christin Johnson PA-C   ipratropium 0 5 mg Nebulization Q6H Leanne Neville MD   ketorolac 1 drop Both Eyes 4x Daily Woody Brigitte, DO   levalbuterol 1 25 mg Nebulization Q6H Jody Avila MD   metoprolol tartrate 50 mg Oral Q12H [facility] Jacob Salcedo MD   nitroglycerin 0 4 mg Sublingual Q5 Min PRN Woody Brigitte, DO   ondansetron 4 mg Intravenous Q6H PRN TIANA Solomon   oseltamivir 30 mg Oral Once Woody Brigitte, DO   oseltamivir 30 mg Oral After Dialysis Trista Sierra MD   polyethylene glycol 17 g Oral Daily Woody Brigitte, DO   sertraline 50 mg Oral Daily Woody Brigitte, DO   sevelamer 800 mg Oral TID With Meals Woody Brigitte, DO   vancomycin 10 mg/kg (Adjusted) Intravenous After Dialysis TIANA Camilo        Today, Patient Was Seen By: Mirlande Vasquez PA-C    ** Please Note: Dictation voice to text software may have been used in the creation of this document   **

## 2020-01-01 NOTE — PROGRESS NOTES
Vancomycin IV Pharmacy-to-Dose Consultation    Pamela Hammer is a 81 y o  female who is currently receiving Vancomycin IV with management by the Pharmacy Consult service  Assessment/Plan:  The patient was reviewed  Renal function is stable and no signs or symptoms of nephrotoxicity and/or infusion reactions were documented in the chart  Based on todays assessment, continue current vancomycin (day # 3) dosing of 750 mg after each dialysis , with a plan for random level to be drawn prior to HD on 01/02  We will continue to follow the patients culture results and clinical progress daily      Mariela Dee, Pharmacist

## 2020-01-02 ENCOUNTER — APPOINTMENT (INPATIENT)
Dept: DIALYSIS | Facility: HOSPITAL | Age: 85
DRG: 193 | End: 2020-01-02
Attending: INTERNAL MEDICINE
Payer: MEDICARE

## 2020-01-02 LAB
ANION GAP SERPL CALCULATED.3IONS-SCNC: 14 MMOL/L (ref 4–13)
BASOPHILS # BLD AUTO: 0.02 THOUSANDS/ΜL (ref 0–0.1)
BASOPHILS NFR BLD AUTO: 0 % (ref 0–1)
BUN SERPL-MCNC: 53 MG/DL (ref 5–25)
CALCIUM SERPL-MCNC: 7.6 MG/DL (ref 8.3–10.1)
CHLORIDE SERPL-SCNC: 100 MMOL/L (ref 100–108)
CO2 SERPL-SCNC: 22 MMOL/L (ref 21–32)
CREAT SERPL-MCNC: 6.38 MG/DL (ref 0.6–1.3)
EOSINOPHIL # BLD AUTO: 0.29 THOUSAND/ΜL (ref 0–0.61)
EOSINOPHIL NFR BLD AUTO: 5 % (ref 0–6)
ERYTHROCYTE [DISTWIDTH] IN BLOOD BY AUTOMATED COUNT: 15.9 % (ref 11.6–15.1)
GFR SERPL CREATININE-BSD FRML MDRD: 6 ML/MIN/1.73SQ M
GLUCOSE SERPL-MCNC: 86 MG/DL (ref 65–140)
HCT VFR BLD AUTO: 31 % (ref 34.8–46.1)
HGB BLD-MCNC: 9.6 G/DL (ref 11.5–15.4)
IMM GRANULOCYTES # BLD AUTO: 0.02 THOUSAND/UL (ref 0–0.2)
IMM GRANULOCYTES NFR BLD AUTO: 0 % (ref 0–2)
LYMPHOCYTES # BLD AUTO: 1.54 THOUSANDS/ΜL (ref 0.6–4.47)
LYMPHOCYTES NFR BLD AUTO: 26 % (ref 14–44)
MCH RBC QN AUTO: 27.4 PG (ref 26.8–34.3)
MCHC RBC AUTO-ENTMCNC: 31 G/DL (ref 31.4–37.4)
MCV RBC AUTO: 89 FL (ref 82–98)
MONOCYTES # BLD AUTO: 0.62 THOUSAND/ΜL (ref 0.17–1.22)
MONOCYTES NFR BLD AUTO: 10 % (ref 4–12)
NEUTROPHILS # BLD AUTO: 3.52 THOUSANDS/ΜL (ref 1.85–7.62)
NEUTS SEG NFR BLD AUTO: 59 % (ref 43–75)
NRBC BLD AUTO-RTO: 0 /100 WBCS
PLATELET # BLD AUTO: 118 THOUSANDS/UL (ref 149–390)
PMV BLD AUTO: 10 FL (ref 8.9–12.7)
POTASSIUM SERPL-SCNC: 4 MMOL/L (ref 3.5–5.3)
RBC # BLD AUTO: 3.5 MILLION/UL (ref 3.81–5.12)
SODIUM SERPL-SCNC: 136 MMOL/L (ref 136–145)
WBC # BLD AUTO: 6.01 THOUSAND/UL (ref 4.31–10.16)

## 2020-01-02 PROCEDURE — 94640 AIRWAY INHALATION TREATMENT: CPT

## 2020-01-02 PROCEDURE — 92526 ORAL FUNCTION THERAPY: CPT

## 2020-01-02 PROCEDURE — 80048 BASIC METABOLIC PNL TOTAL CA: CPT | Performed by: INTERNAL MEDICINE

## 2020-01-02 PROCEDURE — 94760 N-INVAS EAR/PLS OXIMETRY 1: CPT

## 2020-01-02 PROCEDURE — 99232 SBSQ HOSP IP/OBS MODERATE 35: CPT | Performed by: PHYSICIAN ASSISTANT

## 2020-01-02 PROCEDURE — 99233 SBSQ HOSP IP/OBS HIGH 50: CPT | Performed by: INTERNAL MEDICINE

## 2020-01-02 PROCEDURE — 85025 COMPLETE CBC W/AUTO DIFF WBC: CPT | Performed by: NURSE PRACTITIONER

## 2020-01-02 RX ADMIN — DOXERCALCIFEROL 1 MCG: 4 INJECTION, SOLUTION INTRAVENOUS at 12:04

## 2020-01-02 RX ADMIN — OSELTAMIVIR PHOSPHATE 30 MG: 30 CAPSULE ORAL at 11:53

## 2020-01-02 RX ADMIN — SERTRALINE HYDROCHLORIDE 50 MG: 50 TABLET ORAL at 11:54

## 2020-01-02 RX ADMIN — HEPARIN SODIUM 5000 UNITS: 5000 INJECTION INTRAVENOUS; SUBCUTANEOUS at 05:30

## 2020-01-02 RX ADMIN — HEPARIN SODIUM 5000 UNITS: 5000 INJECTION INTRAVENOUS; SUBCUTANEOUS at 13:06

## 2020-01-02 RX ADMIN — CEFAZOLIN SODIUM 2000 MG: 2 SOLUTION INTRAVENOUS at 12:16

## 2020-01-02 RX ADMIN — SEVELAMER HYDROCHLORIDE 800 MG: 800 TABLET, FILM COATED PARENTERAL at 17:42

## 2020-01-02 RX ADMIN — KETOROLAC TROMETHAMINE 1 DROP: 5 SOLUTION OPHTHALMIC at 21:47

## 2020-01-02 RX ADMIN — POLYETHYLENE GLYCOL 3350 17 G: 17 POWDER, FOR SOLUTION ORAL at 11:55

## 2020-01-02 RX ADMIN — IPRATROPIUM BROMIDE 0.5 MG: 0.5 SOLUTION RESPIRATORY (INHALATION) at 14:38

## 2020-01-02 RX ADMIN — DOCUSATE SODIUM 100 MG: 100 CAPSULE, LIQUID FILLED ORAL at 11:55

## 2020-01-02 RX ADMIN — BUSPIRONE HYDROCHLORIDE 5 MG: 5 TABLET ORAL at 17:41

## 2020-01-02 RX ADMIN — BUSPIRONE HYDROCHLORIDE 5 MG: 5 TABLET ORAL at 21:47

## 2020-01-02 RX ADMIN — METOPROLOL TARTRATE 50 MG: 50 TABLET, FILM COATED ORAL at 21:47

## 2020-01-02 RX ADMIN — ASPIRIN 81 MG 81 MG: 81 TABLET ORAL at 11:54

## 2020-01-02 RX ADMIN — LEVALBUTEROL HYDROCHLORIDE 1.25 MG: 1.25 SOLUTION, CONCENTRATE RESPIRATORY (INHALATION) at 07:21

## 2020-01-02 RX ADMIN — METOPROLOL TARTRATE 50 MG: 50 TABLET, FILM COATED ORAL at 11:54

## 2020-01-02 RX ADMIN — IPRATROPIUM BROMIDE 0.5 MG: 0.5 SOLUTION RESPIRATORY (INHALATION) at 07:21

## 2020-01-02 RX ADMIN — LEVALBUTEROL HYDROCHLORIDE 1.25 MG: 1.25 SOLUTION, CONCENTRATE RESPIRATORY (INHALATION) at 14:38

## 2020-01-02 RX ADMIN — HEPARIN SODIUM 5000 UNITS: 5000 INJECTION INTRAVENOUS; SUBCUTANEOUS at 21:46

## 2020-01-02 RX ADMIN — KETOROLAC TROMETHAMINE 1 DROP: 5 SOLUTION OPHTHALMIC at 17:42

## 2020-01-02 RX ADMIN — KETOROLAC TROMETHAMINE 1 DROP: 5 SOLUTION OPHTHALMIC at 12:25

## 2020-01-02 RX ADMIN — SEVELAMER HYDROCHLORIDE 800 MG: 800 TABLET, FILM COATED PARENTERAL at 11:56

## 2020-01-02 RX ADMIN — AMLODIPINE BESYLATE 10 MG: 10 TABLET ORAL at 11:54

## 2020-01-02 RX ADMIN — BUSPIRONE HYDROCHLORIDE 5 MG: 5 TABLET ORAL at 11:55

## 2020-01-02 RX ADMIN — ATORVASTATIN CALCIUM 40 MG: 40 TABLET, FILM COATED ORAL at 17:41

## 2020-01-02 NOTE — ASSESSMENT & PLAN NOTE
· 2/2 blood cultures bottles staph epidermidis initially  · ID consult appreciated: continue vancomycin IV   · Repeat blood cultures were requested but unfortunately patient initially refused, and they were only obtained after patient had already received antibiotics (sent on December 31st)  That being said 1 of the 2 sets of repeat cultures is now positive as well  As such patient will require removal of her current PermCath, which will be done after an extra dialysis session tomorrow and then patient will have a line holiday over the weekend before replacement of catheter on Monday    She will require 4 weeks of IV antibiotics with surveillance cultures after

## 2020-01-02 NOTE — PROGRESS NOTES
Progress Note - [facility] [facility] 1935, 81 y o  female MRN: [id number]    Unit/Bed#: S -01 Encounter: [id number]    Primary Care Provider: Aliyah Arguelles MD   Date and time admitted to hospital: 12/29/2019  7:21 AM  * Influenza A  Assessment & Plan  · Arrived with shortness of breath from dialysis center  Appears to be much better overall  · Influenza A (+), CXR negative  · Tamiflu dosed after HD to complete a course  · Mucinex BID, Tessalon Perles  · P r n  Robitussin DM  · Xopenex nebulizers for wheezing  Gram-positive bacteremia  Assessment & Plan  · 2/2 blood cultures bottles staph epidermidis initially  · ID consult appreciated: continue vancomycin IV   · Repeat blood cultures were requested but unfortunately patient initially refused, and they were only obtained after patient had already received antibiotics (sent on December 31st)  That being said 1 of the 2 sets of repeat cultures is now positive as well  As such patient will require removal of her current PermCath, which will be done after an extra dialysis session tomorrow and then patient will have a line holiday over the weekend before replacement of catheter on Monday  She will require 4 weeks of IV antibiotics with surveillance cultures after    non MI troponin elevation  Assessment & Plan  · Anticoagulation - heparin drip initially, now stopped  · Anti-platelet -  Aspirin  · Beta-blocker - continue labetalol  · Statin - continue atorvastatin  · Nitrate - p r n  Nitroglycerin  · Evaluation -   · Cardiology consultation appreciated  · Echocardiogram: Ejection fraction was estimated in the range of 55 % to 60 %  There was mild hypokinesis of the entire inferior wall(s)  There was possible hypokinesis of the basal-mid inferolateral wall(s)  grade 1 diastolic dysfunction  · Trops elevated with max 2 19, EKG without ischemic change  · Currently patient denies any chest pain symptoms, but poor historian    ESRD on hemodialysis [facility])  Assessment & Plan  · Nephrology managing, appreciate input  · HD TTS, HD holiday on 12/30-resumed today    Essential hypertension  Assessment & Plan  · Continue medications/stable  · Monitor blood pressures  Chronic Aphasia / Cognitive impairment  Assessment & Plan  · History of stroke  · Supportive care  · Patient is a limited historian  Swelling of extremity  Assessment & Plan  · Right arm swelling noted on exam- likely IV infiltration  · No clot noted on venous duplex of right arm     Oropharyngeal dysphagia  Assessment & Plan  · VBS during previous hospitalization recommended modified diet  · Patient noted to have facial grimace during evaluation but unfortunately she is so unreliable with her underlying cognitive impairment so I would suggest that we use phenol as needed but not pursue invasive ENT evaluation at this point  · SLP evaluated and recommend Dysphagia level 2 with nectar thickened liquids  · Aspiration precautions  · Continue to monitor    Continuous opioid dependence ([facility] [address] )  Assessment & Plan  · Continue home medications  VTE Pharmacologic Prophylaxis:   Pharmacologic: Heparin  Mechanical VTE Prophylaxis in Place: Yes    Patient Centered Rounds: I have performed bedside rounds with nursing staff today  Discussions with Specialists or Other Care Team Provider:  Case management, Infectious Disease, Nephrology, speech therapy    Education and Discussions with Family / Patient: Both Nephrology and ID spoke with patient's daughter and I spoke with her caregiver    Time Spent for Care: 30 minutes  More than 50% of total time spent on counseling and coordination of care as described above      Current Length of Stay: 4 day(s)    Current Patient Status: Inpatient   Certification Statement: The patient will continue to require additional inpatient hospital stay due to Ongoing bacteremia    Discharge Plan:  Return to skilled nursing facility when cleared by Infectious Disease but will not be sooner than early next week    Code Status: Level 3 - DNAR and DNI    Subjective:   Patient provides no history  Nursing reports that she does not appear to be short of breath or wheezing at this time  Earlier in the morning she appeared quite uncomfortable and was tearful  She seemed like her back was bothering her but was better as soon as she was repositioned  Objective:     Vitals:   Temp (24hrs), Av 3 °F (36 8 °C), Min:97 9 °F (36 6 °C), Max:98 7 °F (37 1 °C)    Temp:  [97 9 °F (36 6 °C)-98 7 °F (37 1 °C)] 98 3 °F (36 8 °C)  HR:  [63-84] 80  Resp:  [15-20] 18  BP: (131-205)/() 173/67  SpO2:  [91 %-98 %] 98 %  Body mass index is 32 57 kg/m²  Input and Output Summary (last 24 hours): Intake/Output Summary (Last 24 hours) at 2020 1348  Last data filed at 2020 1130  Gross per 24 hour   Intake 700 ml   Output 1800 ml   Net -1100 ml       Physical Exam:     Physical Exam   Constitutional: No distress  Facial grimace noted  She appears uncomfortable at times but unfortunately cannot seem to clarify for me what is bothering her  She is not in any distress  Currently undergoing dialysis during my evaluation   Eyes: Conjunctivae are normal  Right eye exhibits no discharge  Left eye exhibits no discharge  No scleral icterus  Cardiovascular: Normal rate and regular rhythm  Pulmonary/Chest: No stridor  No respiratory distress  She has no wheezes  No dyspnea or tachypnea  No wheezes heard on exam today  Very minimal effort provided  Dialysis catheter in left chest wall   Abdominal: Soft  She exhibits no distension  There is no tenderness  There is no guarding  Musculoskeletal: She exhibits edema  Ongoing bilateral ankle edema and right upper extremity edema though some improvement noted   Neurological: She is alert  Awake alert but poor eye contact and not able to provide history   Skin: Skin is warm and dry  No rash noted  She is not diaphoretic  No erythema   No pallor  Psychiatric:   Calm and cooperative no agitation   Vitals reviewed  Additional Data:     Labs:    Results from last 7 days   Lab Units 01/02/20  0518   WBC Thousand/uL 6 01   HEMOGLOBIN g/dL 9 6*   HEMATOCRIT % 31 0*   PLATELETS Thousands/uL 118*   NEUTROS PCT % 59   LYMPHS PCT % 26   MONOS PCT % 10   EOS PCT % 5     Results from last 7 days   Lab Units 01/02/20  0518  12/29/19  0849   SODIUM mmol/L 136   < > 139   POTASSIUM mmol/L 4 0   < > 4 6   CHLORIDE mmol/L 100   < > 101   CO2 mmol/L 22   < > 22   BUN mg/dL 53*   < > 68*   CREATININE mg/dL 6 38*   < > 7 25*   ANION GAP mmol/L 14*   < > 16*   CALCIUM mg/dL 7 6*   < > 8 3   ALBUMIN g/dL  --   --  3 5   TOTAL BILIRUBIN mg/dL  --   --  0 73   ALK PHOS U/L  --   --  103   ALT U/L  --   --  57   AST U/L  --   --  58*   GLUCOSE RANDOM mg/dL 86   < > 141*    < > = values in this interval not displayed  Results from last 7 days   Lab Units 12/29/19  0849   INR  1 20*             Results from last 7 days   Lab Units 12/29/19  0849   LACTIC ACID mmol/L 0 9     * I Have Reviewed All Lab Data Listed Above  * Additional Pertinent Lab Tests Reviewed:  Gloria 66 Admission Reviewed    Imaging:    Imaging Reports Reviewed Today Include:   Imaging Personally Reviewed by Myself Includes:      Recent Cultures (last 7 days):     Results from last 7 days   Lab Units 12/31/19  1020 12/31/19  0959 12/29/19  0907 12/29/19  0849   BLOOD CULTURE  No Growth at 24 hrs   --  Staphylococcus epidermidis* Staphylococcus epidermidis*   GRAM STAIN RESULT   --  Gram positive cocci in clusters* Gram positive cocci in clusters* Gram positive cocci in clusters*       Last 24 Hours Medication List:     Current Facility-Administered Medications:  acetaminophen 650 mg Oral Q6H PRN Aquiles Hernandez DO    albuterol 2 5 mg Nebulization Q6H PRN Nydia Woodson MD    amLODIPine 10 mg Oral Daily Lauren Dorantes MD    aspirin 81 mg Oral Daily Aquiles Hernandez DO atorvastatin 40 mg Oral QPM Latanya Cox DO    benzonatate 100 mg Oral TID PRN Mary Shah PA-C    bisacodyl 5 mg Oral Daily PRN Latanya Cox DO    busPIRone 5 mg Oral TID Latanya Cox DO    [START ON 1/4/2020] cefazolin 3,000 mg Intravenous After Dialysis Dominick Anderson MD    cefazolin 2,000 mg Intravenous After Dialysis TIANA Lorenzo Last Rate: 2,000 mg (01/02/20 1216)   docusate sodium 100 mg Oral BID Latanya Cox DO    doxercalciferol 1 mcg Intravenous After Dialysis Latanya Cox DO    fentaNYL 1 patch Transdermal Q72H Latanya Cox DO    heparin (porcine) 5,000 Units Subcutaneous Q8H [address] Christin Johnson PA-C    ipratropium 0 5 mg Nebulization Q6H Von Duenas MD    ketorolac 1 drop Both Eyes 4x Daily Latanya Cox DO    levalbuterol 1 25 mg Nebulization Q6H Von Duenas MD    metoprolol tartrate 50 mg Oral Q12H [address] Kenny Rahman MD    nitroglycerin 0 4 mg Sublingual Q5 Min PRN Latanya Cox DO    ondansetron 4 mg Intravenous Q6H PRN TIANA Coker    oseltamivir 30 mg Oral Once Latanya Cox DO    oseltamivir 30 mg Oral After Dialysis Bethann Hammans, MD    phenol 1 spray Mouth/Throat Q2H PRN Christin Johnson PA-C    polyethylene glycol 17 g Oral Daily Latanya Cox DO    sertraline 50 mg Oral Daily Latanya Cox DO    sevelamer 800 mg Oral TID With Meals Latanya Cox DO         Today, Patient Was Seen By: Mary Shah PA-C    ** Please Note: Dictation voice to text software may have been used in the creation of this document   **

## 2020-01-02 NOTE — HEMODIALYSIS
Tx completed via catheter  BFR as prescribed  1 2 l removed  Pt tolerated well  Vitals stable  Post HD weight on a bed scale 80 kg  Weight yo not match with previous weight   Blankets and SCD pump removed for weight

## 2020-01-02 NOTE — PROGRESS NOTES
[address] [facility] NOTE   Pinky Downing 81 y o  female MRN: [id number]  Unit/Bed#: S -01 Encounter: [id number]  Reason for Consult: ESRD    ASSESSMENT and PLAN:    81-year-old female with a past medical history of ESRD, hypertension who initially presented with shortness of breath on 12/29  Yin Aguilera had a recent admission on 12/27 for catheter malfunction which was replaced on 12/27   Patient was sent in with shortness of breath and was found to have influenza positive swab   Nephrology is on board for ESRD      1) ESRD -      - on HD at [facility] [address] is left IJ tunneled dialysis catheter  - HD prescription - Na 138, bicarb 30, F160, time 3 5 hours, EDW 87,   - HD on 12/30 due to holiday  - HD Today - completed this AM   - Next dialysis treatment on Friday 1/3 in the morning  Which should allow for dialysis catheter removal tomorrow due to bacteremia  -line holiday over the weekend  -possibly replace catheter early next week  - will need tamiflu after dialysis tomorrow  - cefazolin will need to be redosed after dialysis tomorrow  - pt is aphasic and daughter is POA  -I have discussed the plan with the interventional radiology team, infectious disease team, primary team, HD unit, and the patient's family member   - f/u final blood cultures - staph epidermidis  Will need 2 week antibiotic course  F/u repeat cultures on 12/31 which are also positive 1/2 bottles  Will need repeat cultures per the infectious disease team   - RUE edema - unclear etiology   Duplex unrevealing      2) electrolytes - stable     3) trop elevation     - per Primary Team and Cardiology team  - was on hep gtt  - f/u ECHO - EF 55-60, mild hypokinesis of entire inferior wall, possible hypokinesis of basal-mid inferolateral walls, grade I dCHF     4) anemia - Hb above goal     5) HTN     - monitor for hypotension     6) MBD -  Cont phos binder and hecterol     7) influenza - tamiflu dose adjusted for renal dosing     8) bacteremia     - cultures from admission positive for GPC  - ID on board    SUBJECTIVE / INTERVAL HISTORY:    Pt denies complaints, but is aphasic       OBJECTIVE:  Current Weight: Weight - Scale: 83 4 kg (183 lb 13 8 oz)  Vitals:    01/02/20 1000 01/02/20 1030 01/02/20 1100 01/02/20 1130   BP: (!) 189/72 (!) 186/107 (!) 171/112 (!) 173/67   BP Location:    Right arm   Pulse: 75 78 84 80   Resp: 18 18 18 18   Temp:    98 3 °F (36 8 °C)   TempSrc:    Oral   SpO2:       Weight:       Height:           Intake/Output Summary (Last 24 hours) at 1/2/2020 1330  Last data filed at 1/2/2020 1130  Gross per 24 hour   Intake 700 ml   Output 1800 ml   Net -1100 ml     General: NAD  Skin: no rash  Eyes: anicteric sclera  ENT: dry mucous membrane  Neck: supple  Chest: CTA b/l, no ronchii, no wheeze, no rubs, no rales  CVS: s1s2, no murmur, no gallop, no rub  Abdomen: soft, nontender, nl sounds  Extremities: no sig edema LE b/l  : no ambrosio  Neuro: AAOX2-3  Psych: normal affect    Medications:    Current Facility-Administered Medications:     acetaminophen (TYLENOL) tablet 650 mg, 650 mg, Oral, Q6H PRN, Liu Shay DO, 650 mg at 12/30/19 1011    albuterol inhalation solution 2 5 mg, 2 5 mg, Nebulization, Q6H PRN, Fabiana Haddad MD    amLODIPine (NORVASC) tablet 10 mg, 10 mg, Oral, Daily, Caden Gerard MD, 10 mg at 01/02/20 1154    aspirin chewable tablet 81 mg, 81 mg, Oral, Daily, Liu Shay DO, 81 mg at 01/02/20 1154    atorvastatin (LIPITOR) tablet 40 mg, 40 mg, Oral, QPM, Liu Shay DO, 40 mg at 01/01/20 1815    benzonatate (TESSALON PERLES) capsule 100 mg, 100 mg, Oral, TID PRN, Christin Johnson PA-C    bisacodyl (DULCOLAX) EC tablet 5 mg, 5 mg, Oral, Daily PRN, Liu Shay DO, 5 mg at 12/30/19 1153    busPIRone (BUSPAR) tablet 5 mg, 5 mg, Oral, TID, Liu Shay DO, 5 mg at 01/02/20 1155    [START ON 1/4/2020] ceFAZolin (ANCEF) 3,000 mg in dextrose 5 % 100 mL IVPB, 3,000 mg, Intravenous, After Dialysis, Harshal Quiroz MD    ceFAZolin (ANCEF) IVPB (premix) 2,000 mg, 2,000 mg, Intravenous, After Dialysis, TIANA Bautista, Last Rate: 100 mL/hr at 01/02/20 1216, 2,000 mg at 01/02/20 1216    docusate sodium (COLACE) capsule 100 mg, 100 mg, Oral, BID, Purnima Ibarra DO, 100 mg at 01/02/20 1155    doxercalciferol (HECTOROL) injection 1 mcg, 1 mcg, Intravenous, After Dialysis, Purnima Ibarra DO, 1 mcg at 01/02/20 1204    fentaNYL (DURAGESIC) 25 mcg/hr TD 72 hr patch 1 patch, 1 patch, Transdermal, Q72H, Purnima Ibarra DO, 1 patch at 01/01/20 1956    heparin (porcine) subcutaneous injection 5,000 Units, 5,000 Units, Subcutaneous, Q8H [address], Christin Johnson PA-C, 5,000 Units at 01/02/20 1306    ipratropium (ATROVENT) 0 02 % inhalation solution 0 5 mg, 0 5 mg, Nebulization, Q6H, Natali Almonte MD, 0 5 mg at 01/02/20 0721    ketorolac (ACULAR) 0 5 % ophthalmic solution 1 drop, 1 drop, Both Eyes, 4x Daily, Purnima Ibarra DO, 1 drop at 01/02/20 1225    levalbuterol (XOPENEX) inhalation solution 1 25 mg, 1 25 mg, Nebulization, Q6H, Natali Almonte MD, 1 25 mg at 01/02/20 0721    metoprolol tartrate (LOPRESSOR) tablet 50 mg, 50 mg, Oral, Q12H [address], Sebastien Melendez MD, 50 mg at 01/02/20 1154    nitroglycerin (NITROSTAT) SL tablet 0 4 mg, 0 4 mg, Sublingual, Q5 Min PRN, Purnima Ibarra DO    ondansetron [facility]) injection 4 mg, 4 mg, Intravenous, Q6H PRN, TIANA Guzmán, 4 mg at 12/31/19 1318    oseltamivir (TAMIFLU) capsule 30 mg, 30 mg, Oral, Once, Purnima Ibarra DO    oseltamivir (TAMIFLU) capsule 30 mg, 30 mg, Oral, After Dialysis, Marly Das MD, 30 mg at 01/02/20 1153    phenol (CHLORASEPTIC) 1 4 % mucosal liquid 1 spray, 1 spray, Mouth/Throat, Q2H PRN, Chrsitin Johnson PA-C    polyethylene glycol (MIRALAX) packet 17 g, 17 g, Oral, Daily, Bennie Santiago DO, 17 g at 01/02/20 1155    sertraline (ZOLOFT) tablet 50 mg, 50 mg, Oral, Daily, Vin Scott DO, 50 mg at 01/02/20 1154    sevelamer (RENAGEL) tablet 800 mg, 800 mg, Oral, TID With Meals, Vin Scott , 800 mg at 01/02/20 1156    Laboratory Results:  Results from last 7 days   Lab Units 01/02/20  0518 01/01/20  0836 12/31/19  1001 12/30/19  0837 12/29/19  0849 12/28/19  0839 12/27/19  0431 12/26/19  1619   WBC Thousand/uL 6 01 5 08 5 69 4 37 9 65 7 36  --  6 76   HEMOGLOBIN g/dL 9 6* 9 7* 10 2* 10 6* 11 3* 10 2*  --  10 3*   HEMATOCRIT % 31 0* 31 0* 32 6* 33 8* 37 8 34 2*  --  34 5*   PLATELETS Thousands/uL 118* 129* 127* 133* 147* 137*  --  146*  154   POTASSIUM mmol/L 4 0 3 9 3 4* 3 7 4 6 4 1 4 8  --    CHLORIDE mmol/L 100 99* 99* 99* 101 101 107  --    CO2 mmol/L 22 24 23 25 22 24 24  --    BUN mg/dL 53* 45* 36* 48* 68* 49* 66*  --    CREATININE mg/dL 6 38* 5 52* 4 42* 5 06* 7 25* 5 88* 7 59*  --    CALCIUM mg/dL 7 6* 7 7* 7 7* 7 7* 8 3 8 2* 8 7  --    MAGNESIUM mg/dL  --   --   --   --  2 5  --   --   --

## 2020-01-02 NOTE — CONSULTS
Vancomycin IV Pharmacy-to-Dose Consultation    Liset Villanueva is a 81 y o  female who was receiving Vancomycin IV with management by the Pharmacy Consult service for treatment of bacteremia  The patient's Vancomycin therapy has been completed / discontinued  Thank you for allowing us to take part in this patient's care  Pharmacy will sign-off now; please call or re-consult if there are any questions          Deysi Ness, Jovanna  Pharmacist

## 2020-01-02 NOTE — PLAN OF CARE
Problem: Potential for Falls  Goal: Patient will remain free of falls  Description  INTERVENTIONS:  - Assess patient frequently for physical needs  -  Identify cognitive and physical deficits and behaviors that affect risk of falls    -  [place] fall precautions as indicated by assessment   - Educate patient/family on patient safety including physical limitations  - Instruct patient to call for assistance with activity based on assessment  - Modify environment to reduce risk of injury  - Consider OT/PT consult to assist with strengthening/mobility  Outcome: Progressing     Problem: Prexisting or High Potential for Compromised Skin Integrity  Goal: Skin integrity is maintained or improved  Description  INTERVENTIONS:  - Identify patients at risk for skin breakdown  - Assess and monitor skin integrity  - Assess and monitor nutrition and hydration status  - Monitor labs   - Assess for incontinence   - Turn and reposition patient  - Assist with mobility/ambulation  - Relieve pressure over bony prominences  - Avoid friction and shearing  - Provide appropriate hygiene as needed including keeping skin clean and dry  - Evaluate need for skin moisturizer/barrier cream  - Collaborate with interdisciplinary team   - Patient/family teaching  - Consider wound care consult   Outcome: Progressing     Problem: METABOLIC, FLUID AND ELECTROLYTES - ADULT  Goal: Electrolytes maintained within normal limits  Description  INTERVENTIONS:  - Monitor labs and assess patient for signs and symptoms of electrolyte imbalances  - Administer electrolyte replacement as ordered  - Monitor response to electrolyte replacements, including repeat lab results as appropriate  - Instruct patient on fluid and nutrition as appropriate  Outcome: Progressing  Goal: Fluid balance maintained  Description  INTERVENTIONS:  - Monitor labs   - Monitor I/O and WT  - Instruct patient on fluid and nutrition as appropriate  - Assess for signs & symptoms of volume excess or deficit  Outcome: Progressing     Problem: Nutrition/Hydration-ADULT  Goal: Nutrient/Hydration intake appropriate for improving, restoring or maintaining nutritional needs  Description  Monitor and assess patient's nutrition/hydration status for malnutrition  Collaborate with interdisciplinary team and initiate plan and interventions as ordered  Monitor patient's weight and dietary intake as ordered or per policy  Utilize nutrition screening tool and intervene as necessary  Determine patient's food preferences and provide high-protein, high-caloric foods as appropriate       INTERVENTIONS:  - Monitor oral intake, urinary output, labs, and treatment plans  - Assess nutrition and hydration status and recommend course of action  - Evaluate amount of meals eaten  - Assist patient with eating if necessary   - Allow adequate time for meals  - Recommend/ encourage appropriate diets, oral nutritional supplements, and vitamin/mineral supplements  - Order, calculate, and assess calorie counts as needed  - Recommend, monitor, and adjust tube feedings and TPN/PPN based on assessed needs  - Assess need for intravenous fluids  - Provide specific nutrition/hydration education as appropriate  - Include patient/family/caregiver in decisions related to nutrition  Outcome: Progressing

## 2020-01-02 NOTE — ASSESSMENT & PLAN NOTE
· VBS during previous hospitalization recommended modified diet  · Patient noted to have facial grimace during evaluation but unfortunately she is so unreliable with her underlying cognitive impairment so I would suggest that we use phenol as needed but not pursue invasive ENT evaluation at this point  · SLP evaluated and recommend Dysphagia level 2 with nectar thickened liquids  · Aspiration precautions  · Continue to monitor

## 2020-01-02 NOTE — ASSESSMENT & PLAN NOTE
· Arrived with shortness of breath from dialysis center  Appears to be much better overall  · Influenza A (+), CXR negative  · Tamiflu dosed after HD to complete a course  · Mucinex BID, Tessalon Perles  · P r n  Robitussin DM  · Xopenex nebulizers for wheezing

## 2020-01-02 NOTE — PROGRESS NOTES
Progress Note - Infectious Disease   Meghan Khan 81 y o  female MRN: [id number]  Unit/Bed#: S -01 Encounter: [id number]      Impression/Plan:  1  Staph epidermidis bacteremia  Present in both sets of patient's initial 12/29 blood cultures sensitive to oxacillin  Repeat blood cultures 1 of 2 sets is now again positive for gram-positive cocci in clusters   TTE was negative for valvular vegetation  Concern for PermCath infection exists  Patient's current PermCath was changed over guard wire on 12/27/2019 due to malfunctioning previous catheter   Fortunately the patient remains clinically stable and nontoxic  She is afebrile without leukocytosis  She is currently receiving cefazolin dosed for HD and appears to be tolerating without difficulty  -continue cefazolin dosed for HD  -Will plan to dialyze today and tomorrow and then remove permacath for the weekend  -Will repeat blood cultures  -If repeat blood cultures negative Monday, can replace with new permacath   -anticipate a 28 day antibiotic treatment course from 1st negative blood cultures  -monitor CBC  -follow up blood cultures  -follow up repeat blood cultures  -monitor vitals  -will check surveillance blood cultures 2 weeks after antibiotic treatment course completed      2  Influenza A   PCR was positive on 12/29/2019   Fortunately she remains stable from a respiratory and hemodynamic standpoint     -continue on Tamiflu dosed for HD through 01/04/2020   -monitor CBC  -monitor vitals  -monitor respiratory status  -maintain droplet isolation   -supportive care    3  R arm swelling   Likely secondary to infiltrated IV  She completed a right upper extremity venous duplex - negative for acute DVT and superficial thrombophlebitis  -serial right arm exams     4  End-stage renal disease   On HD   Her current dialysis schedule is Tuesday/Thursday/Saturday   Her current access is a left PermCath     -dose antibiotics for HD  -HD per Nephrology   -Will plan to dialyze today and tomorrow and then remove permacath for the  Weekend   -If repeat blood cultures negative Monday, can replace with new tunneled  cath  -continue close follow-up with Nephrology    Above plan discussed in detail with patient at bedside, daughter VERONICA via phone, Tyrone BEARDEN and Dr Sena Rayo, nephrologist   I spent 45 minutes on unit floor, 30 minutes of which spent in counseling patient and daughter and coordinating of care with team from Infectious Disease standpoint  All of many questions answered, and reassurance given  Antibiotics:  Cefazolin D2  Antibiotics D4  Tamiflu D3     Subjective:  Patient is minimally interactive  She is yelling with discomfort in right forearm with the inflating of the blood pressure cuff  She is denying nausea, PermCath site pain, or chest pain  She has mild cough  Objective:  Vitals:  Temp:  [97 9 °F (36 6 °C)-98 7 °F (37 1 °C)] 98 4 °F (36 9 °C)  HR:  [63-79] 78  Resp:  [15-20] 18  BP: (131-205)/() 186/107  SpO2:  [91 %-98 %] 98 %  Temp (24hrs), Av 3 °F (36 8 °C), Min:97 9 °F (36 6 °C), Max:98 7 °F (37 1 °C)  Current: Temperature: 98 4 °F (36 9 °C)    General Appearance:  Awake, yelling out with inflation of right arm blood pressure cuff and then calms down with deflating, resting in bed, dialyzing via left chest PermCath, no acute distress  Throat: Oropharynx moist without lesions  Lungs:   Few coarse breath sounds scattered throughout anteriorly and laterally, respirations unlabored on room air   Heart:  RRR; S1-S2 heard, no murmur   Abdomen:   Soft, non-tender, non-distended, positive bowel sounds       Extremities: Soft right forearm edema with tenderness improved after blood pressure cuff deflated   Skin: No rashes      Labs, Imaging, & Other studies:   All pertinent labs and imaging studies were personally reviewed  Results from last 7 days   Lab Units 20  0518 20  0836 19  1001   WBC Thousand/uL 6 01 5 08 5 69 HEMOGLOBIN g/dL 9 6* 9 7* 10 2*   PLATELETS Thousands/uL 118* 129* 127*     Results from last 7 days   Lab Units 01/02/20  0518  12/29/19  0849   POTASSIUM mmol/L 4 0   < > 4 6   CHLORIDE mmol/L 100   < > 101   CO2 mmol/L 22   < > 22   BUN mg/dL 53*   < > 68*   CREATININE mg/dL 6 38*   < > 7 25*   EGFR ml/min/1 73sq m 6   < > 5   CALCIUM mg/dL 7 6*   < > 8 3   AST U/L  --   --  58*   ALT U/L  --   --  57   ALK PHOS U/L  --   --  103    < > = values in this interval not displayed           Results from last 7 days   Lab Units 12/31/19  1020 12/31/19  0959 12/29/19  2135 12/29/19  0907 12/29/19  0849   BLOOD CULTURE  No Growth at 24 hrs   --   --  Staphylococcus epidermidis* Staphylococcus epidermidis*   GRAM STAIN RESULT   --  Gram positive cocci in clusters*  --  Gram positive cocci in clusters* Gram positive cocci in clusters*   MRSA CULTURE ONLY   --   --  No Methicillin Resistant Staphlyococcus aureus (MRSA) isolated  --   --

## 2020-01-02 NOTE — SPEECH THERAPY NOTE
Speech Language/Pathology    Speech/Language Pathology Progress Note    Patient Name: Apple Harris  GYDOVAkikoY Date: 1/2/2020       Subjective:  Pt awake/alert w/ PCA present feeding lunch (tuna salad/puree), however pt became lethargic as po trials progressed w/ SLP  PCA reports facial grimacing with all swallows and some expectoration/regurgitation of secretions during oral intake  SLP observed delayed coughing episode upon arrival w/ expectoration of frothy secretions  Wheezing noted at rest      Objective:  Pt seen for dysphagia follow up  Pt consumed puree, NTL by straw and possible advancement w/ thin liquids by straw  However, limited trials today as pt became lethargic/closing eyes with food in oral cavity and required cues to sustain arousal      Pt dependent for feeding  Slow mastication/manipulation w/ prolonged oral holding w/ all consistencies with additional trials (as pt became more lethargic), increasing risk for reduced oral control  Suspect delayed swallow initiation  Laryngeal rise present to palpation  No overt s/s aspiration with limited po trials, however pt continues w/ facial grimacing w/ all swallows and audible/gulping noise w/ thin liquids  Pt reports "bad" throat pain, though difficult to obtain detailed information as pt w/ cognitive impairment  Unable to visualize velum  Assessment:  Pt w/o overt s/s aspiration following limited po trials, however presented w/ prolonged oral holding and appeared more lethargic as compared to prior sessions  Pt continues w/ facial grimacing upon all swallows and reports throat pain       Plan/Recommendations:  Cont current diet  Meds crushed in puree  Aspiration precautions (only feed when fully awake/alert, small bites/sips, fully upright, slow rate)   Will cont to follow   Consider ENT consult given facial grimacing and reported throat pain - *Update per PAALINA: "unfortunately she (pt) is so unreliable with her underlying cognitive impairment so I would suggest that we use phenol as needed but not pursue invasive ENT evaluation at this point"

## 2020-01-03 ENCOUNTER — APPOINTMENT (INPATIENT)
Dept: DIALYSIS | Facility: HOSPITAL | Age: 85
DRG: 193 | End: 2020-01-03
Attending: INTERNAL MEDICINE
Payer: MEDICARE

## 2020-01-03 ENCOUNTER — APPOINTMENT (INPATIENT)
Dept: RADIOLOGY | Facility: HOSPITAL | Age: 85
DRG: 193 | End: 2020-01-03
Attending: INTERNAL MEDICINE
Payer: MEDICARE

## 2020-01-03 LAB
ANION GAP SERPL CALCULATED.3IONS-SCNC: 12 MMOL/L (ref 4–13)
BUN SERPL-MCNC: 33 MG/DL (ref 5–25)
CALCIUM SERPL-MCNC: 7.8 MG/DL (ref 8.3–10.1)
CHLORIDE SERPL-SCNC: 100 MMOL/L (ref 100–108)
CO2 SERPL-SCNC: 22 MMOL/L (ref 21–32)
CREAT SERPL-MCNC: 4.45 MG/DL (ref 0.6–1.3)
ERYTHROCYTE [DISTWIDTH] IN BLOOD BY AUTOMATED COUNT: 15.7 % (ref 11.6–15.1)
GFR SERPL CREATININE-BSD FRML MDRD: 10 ML/MIN/1.73SQ M
GLUCOSE SERPL-MCNC: 111 MG/DL (ref 65–140)
HCT VFR BLD AUTO: 30.3 % (ref 34.8–46.1)
HGB BLD-MCNC: 9.4 G/DL (ref 11.5–15.4)
MCH RBC QN AUTO: 27.5 PG (ref 26.8–34.3)
MCHC RBC AUTO-ENTMCNC: 31 G/DL (ref 31.4–37.4)
MCV RBC AUTO: 89 FL (ref 82–98)
PLATELET # BLD AUTO: 136 THOUSANDS/UL (ref 149–390)
PMV BLD AUTO: 11.6 FL (ref 8.9–12.7)
POTASSIUM SERPL-SCNC: 3.9 MMOL/L (ref 3.5–5.3)
RBC # BLD AUTO: 3.42 MILLION/UL (ref 3.81–5.12)
SODIUM SERPL-SCNC: 134 MMOL/L (ref 136–145)
WBC # BLD AUTO: 6.41 THOUSAND/UL (ref 4.31–10.16)

## 2020-01-03 PROCEDURE — 85027 COMPLETE CBC AUTOMATED: CPT | Performed by: INTERNAL MEDICINE

## 2020-01-03 PROCEDURE — 94640 AIRWAY INHALATION TREATMENT: CPT

## 2020-01-03 PROCEDURE — 99232 SBSQ HOSP IP/OBS MODERATE 35: CPT | Performed by: PHYSICIAN ASSISTANT

## 2020-01-03 PROCEDURE — 87070 CULTURE OTHR SPECIMN AEROBIC: CPT | Performed by: INTERNAL MEDICINE

## 2020-01-03 PROCEDURE — 94760 N-INVAS EAR/PLS OXIMETRY 1: CPT

## 2020-01-03 PROCEDURE — 90935 HEMODIALYSIS ONE EVALUATION: CPT | Performed by: INTERNAL MEDICINE

## 2020-01-03 PROCEDURE — 87040 BLOOD CULTURE FOR BACTERIA: CPT | Performed by: INTERNAL MEDICINE

## 2020-01-03 PROCEDURE — 80048 BASIC METABOLIC PNL TOTAL CA: CPT | Performed by: INTERNAL MEDICINE

## 2020-01-03 PROCEDURE — 36589 REMOVAL TUNNELED CV CATH: CPT | Performed by: RADIOLOGY

## 2020-01-03 PROCEDURE — 36589 REMOVAL TUNNELED CV CATH: CPT

## 2020-01-03 PROCEDURE — 99232 SBSQ HOSP IP/OBS MODERATE 35: CPT | Performed by: INTERNAL MEDICINE

## 2020-01-03 RX ADMIN — LEVALBUTEROL HYDROCHLORIDE 1.25 MG: 1.25 SOLUTION, CONCENTRATE RESPIRATORY (INHALATION) at 19:44

## 2020-01-03 RX ADMIN — BUSPIRONE HYDROCHLORIDE 5 MG: 5 TABLET ORAL at 09:15

## 2020-01-03 RX ADMIN — HEPARIN SODIUM 5000 UNITS: 5000 INJECTION INTRAVENOUS; SUBCUTANEOUS at 22:31

## 2020-01-03 RX ADMIN — ASPIRIN 81 MG 81 MG: 81 TABLET ORAL at 09:15

## 2020-01-03 RX ADMIN — KETOROLAC TROMETHAMINE 1 DROP: 5 SOLUTION OPHTHALMIC at 22:32

## 2020-01-03 RX ADMIN — KETOROLAC TROMETHAMINE 1 DROP: 5 SOLUTION OPHTHALMIC at 17:49

## 2020-01-03 RX ADMIN — IPRATROPIUM BROMIDE 0.5 MG: 0.5 SOLUTION RESPIRATORY (INHALATION) at 13:32

## 2020-01-03 RX ADMIN — KETOROLAC TROMETHAMINE 1 DROP: 5 SOLUTION OPHTHALMIC at 11:00

## 2020-01-03 RX ADMIN — DOXERCALCIFEROL 1 MCG: 4 INJECTION, SOLUTION INTRAVENOUS at 10:00

## 2020-01-03 RX ADMIN — CEFAZOLIN SODIUM 3000 MG: 1 INJECTION, POWDER, FOR SOLUTION INTRAMUSCULAR; INTRAVENOUS at 11:09

## 2020-01-03 RX ADMIN — IPRATROPIUM BROMIDE 0.5 MG: 0.5 SOLUTION RESPIRATORY (INHALATION) at 19:44

## 2020-01-03 RX ADMIN — DOCUSATE SODIUM 100 MG: 100 CAPSULE, LIQUID FILLED ORAL at 17:49

## 2020-01-03 RX ADMIN — BUSPIRONE HYDROCHLORIDE 5 MG: 5 TABLET ORAL at 22:32

## 2020-01-03 RX ADMIN — HEPARIN SODIUM 5000 UNITS: 5000 INJECTION INTRAVENOUS; SUBCUTANEOUS at 15:33

## 2020-01-03 RX ADMIN — BUSPIRONE HYDROCHLORIDE 5 MG: 5 TABLET ORAL at 17:49

## 2020-01-03 RX ADMIN — IPRATROPIUM BROMIDE 0.5 MG: 0.5 SOLUTION RESPIRATORY (INHALATION) at 07:26

## 2020-01-03 RX ADMIN — LEVALBUTEROL HYDROCHLORIDE 1.25 MG: 1.25 SOLUTION, CONCENTRATE RESPIRATORY (INHALATION) at 13:32

## 2020-01-03 RX ADMIN — OSELTAMIVIR PHOSPHATE 30 MG: 30 CAPSULE ORAL at 12:25

## 2020-01-03 RX ADMIN — ATORVASTATIN CALCIUM 40 MG: 40 TABLET, FILM COATED ORAL at 17:49

## 2020-01-03 RX ADMIN — ACETAMINOPHEN 650 MG: 325 TABLET, FILM COATED ORAL at 12:24

## 2020-01-03 RX ADMIN — SERTRALINE HYDROCHLORIDE 50 MG: 50 TABLET ORAL at 09:15

## 2020-01-03 RX ADMIN — BENZONATATE 100 MG: 100 CAPSULE ORAL at 00:20

## 2020-01-03 RX ADMIN — HEPARIN SODIUM 5000 UNITS: 5000 INJECTION INTRAVENOUS; SUBCUTANEOUS at 05:52

## 2020-01-03 RX ADMIN — METOPROLOL TARTRATE 50 MG: 50 TABLET, FILM COATED ORAL at 22:32

## 2020-01-03 RX ADMIN — LEVALBUTEROL HYDROCHLORIDE 1.25 MG: 1.25 SOLUTION, CONCENTRATE RESPIRATORY (INHALATION) at 07:26

## 2020-01-03 NOTE — SEDATION DOCUMENTATION
Permacath removed by dr Johan Shah  Steri strips, gauze and biooclusive placed over wound  Dry and intact  Patient tolerated procedure with no complaints

## 2020-01-03 NOTE — ASSESSMENT & PLAN NOTE
· Nephrology managing, appreciate input  · Normal schedule of HD is TTS, HD holiday on 12/30, resumed on January 2nd    Getting an additional treatment today on Friday January 3rd prior to line holiday starting this weekend (will miss Saturday HD)

## 2020-01-03 NOTE — ASSESSMENT & PLAN NOTE
· 2/2 blood cultures on admission= staph epidermidis  · ID consult appreciated: initially was getting vancomycin IV, now converted to IV ancef  · Repeat blood cultures were requested but unfortunately patient initially refused, and they were only obtained after patient had already received antibiotics (sent on December 31st)  That being said 1 of the 2 sets of repeat cultures are again positive as well  As such patient will require removal of her current PermCath, which will be done today after her extra dialysis session and then patient will have a line holiday over the weekend before replacement of catheter on Monday  Another sent of cultures were sent today to ensure clearing   She will require 4 weeks of IV antibiotics with surveillance cultures after

## 2020-01-03 NOTE — HEMODIALYSIS
Stable 3 5 hr dialysis tx  Rescheduled from sat to today for catheter removal today after hd  Adequate flow from catheter  1500ml uf removed  Pre bed wt 79 4kg, post 78 6kg, -0 8kg  Cefazolin given, hectorol given

## 2020-01-03 NOTE — PROGRESS NOTES
Progress Note - Infectious Disease   Teresa Robbins 81 y o  female MRN: [id number]  Unit/Bed#: S -01 Encounter: [id number]      Impression/Plan:  1  Staph epidermidis bacteremia  PermCath line associated infection suspected as source  Present in both sets of patient's initial 12/29 blood cultures sensitive to oxacillin  Repeat blood cultures 1 of 2 sets is now again positive for Staphylococcus coagulase negative   TTE was negative for valvular vegetation  Patient's current PermCath was changed over guard wire on 12/27/2019 due to malfunctioning previous catheter   Fortunately the patient remains clinically stable and nontoxic  She is afebrile without leukocytosis   She is currently receiving cefazolin dosed for HD and appears to be tolerating without difficulty     -continue cefazolin dosed for HD  -patient dialyzing now and then plan for removal of permacath for the weekend  -Follow all blood cultures including repeat blood cultures drawn this am  -If repeat blood cultures negative Monday, can replace with new permacath   -tentative plan is for 2 weeks of IV antibiotic therapy from date of culture clearance/catheter removal   -monitor CBC  -monitor vitals  -will check surveillance blood cultures 2 weeks after antibiotic treatment course completed      2  Influenza A   PCR was positive on 12/29/2019   Fortunately she remains stable from a respiratory and hemodynamic standpoint     -continue on Tamiflu dosed for HD through 01/03/2020   -monitor CBC  -monitor vitals  -monitor respiratory status  -maintain droplet isolation              -supportive care     3  R arm swelling   Likely secondary to infiltrated IV   She completed a right upper extremity venous duplex - negative for acute DVT and superficial thrombophlebitis                 -serial right arm exams and elevation     4  End-stage renal disease  On HD   Her current dialysis schedule is Tuesday/Thursday/Saturday   Her current access is a left PermCath     -dose antibiotics for HD  -HD per Nephrology              -patient dialyzing now and then plan for removal of permacath for the  weekend              -If repeat blood cultures negative Monday, can replace with new tunneled   cath  -continue close follow-up with Nephrology     Above plan discussed in detail with patient at bedside, HD RN, and JUDAH Waller      Antibiotics:  Cefazolin D3  Antibiotics D5  Tamiflu D4     Subjective:  Patient is sluggishly arousable on hemodialysis  She is  more comfortable than yesterday with blood pressure cuff on right forearm   She is denying nausea, PermCath site pain, or chest pain  She has mild cough with phlegm like sputum production  Objective:  Vitals:  Temp:  [98 3 °F (36 8 °C)-99 5 °F (37 5 °C)] 98 4 °F (36 9 °C)  HR:  [67-87] 87  Resp:  [16-20] 18  BP: (116-181)/(50-81) 116/81  SpO2:  [95 %-100 %] 95 %  Temp (24hrs), Av 7 °F (37 1 °C), Min:98 3 °F (36 8 °C), Max:99 5 °F (37 5 °C)  Current: Temperature: 98 4 °F (36 9 °C)    General Appearance:  Arousable, sluggish resting in bed on dialysis, alert, cooperative, no acute distress  Patient has a movable firm nodule beneath her left submandibular jaw that she reports has been there for a long time and is nontender   Throat: Oropharynx moist without lesions  Chest: Left anterior chest wall hemodialysis catheter dialyzing with some fullness at site that is nontender on exam      Lungs:   Scattered coarse breath sounds anteriorly bilaterally, positive phlegm production with wet cough, respirations unlabored on room air   Heart:  RRR; S1-S2 heard, no murmur   Abdomen:   Soft, non-tender, non-distended, positive bowel sounds       Extremities: Soft right forearm edema less tender today with blood pressure cuff in place   Skin: No rashes      Labs, Imaging, & Other studies:   All pertinent labs and imaging studies were personally reviewed  Results from last 7 days   Lab Units 20  0559 20  0518 01/01/20  0836   WBC Thousand/uL 6 41 6 01 5 08   HEMOGLOBIN g/dL 9 4* 9 6* 9 7*   PLATELETS Thousands/uL 136* 118* 129*     Results from last 7 days   Lab Units 01/03/20  0559  12/29/19  0849   POTASSIUM mmol/L 3 9   < > 4 6   CHLORIDE mmol/L 100   < > 101   CO2 mmol/L 22   < > 22   BUN mg/dL 33*   < > 68*   CREATININE mg/dL 4 45*   < > 7 25*   EGFR ml/min/1 73sq m 10   < > 5   CALCIUM mg/dL 7 8*   < > 8 3   AST U/L  --   --  58*   ALT U/L  --   --  57   ALK PHOS U/L  --   --  103    < > = values in this interval not displayed  Results from last 7 days   Lab Units 01/03/20  0622 01/03/20  0611 12/31/19  1020 12/31/19  0959 12/29/19  2135 12/29/19  0907 12/29/19  0849   BLOOD CULTURE  Received in Microbiology Lab  Culture in Progress  Received in Microbiology Lab  Culture in Progress  No Growth at 48 hrs   Staphylococcus coagulase negative*  --  Staphylococcus epidermidis* Staphylococcus epidermidis*   GRAM STAIN RESULT   --   --   --  Gram positive cocci in clusters*  --  Gram positive cocci in clusters* Gram positive cocci in clusters*   MRSA CULTURE ONLY   --   --   --   --  No Methicillin Resistant Staphlyococcus aureus (MRSA) isolated  --   --

## 2020-01-03 NOTE — PROGRESS NOTES
Progress Note - Meghan Khan 1935, 81 y o  female MRN: [id number]    Unit/Bed#: S -01 Encounter: [id number]    Primary Care Provider: Jaja White MD   Date and time admitted to hospital: 12/29/2019  7:21 AM  * Influenza A  Assessment & Plan  · Arrived with shortness of breath from dialysis center  · Influenza A (+), CXR negative  · Tamiflu dosed after HD to complete a course x 5 days  · Mucinex BID, Tessalon Perles  · P r n  Robitussin DM  · Xopenex nebulizers for wheezing  Gram-positive bacteremia  Assessment & Plan  · 2/2 blood cultures on admission= staph epidermidis  · ID consult appreciated: initially was getting vancomycin IV, now converted to IV ancef  · Repeat blood cultures were requested but unfortunately patient initially refused, and they were only obtained after patient had already received antibiotics (sent on December 31st)  That being said 1 of the 2 sets of repeat cultures are again positive as well  As such patient will require removal of her current PermCath, which will be done today after her extra dialysis session and then patient will have a line holiday over the weekend before replacement of catheter on Monday  Another sent of cultures were sent today to ensure clearing  She will require 4 weeks of IV antibiotics with surveillance cultures after    non MI troponin elevation  Assessment & Plan  · Anticoagulation - heparin drip initially, now stopped  · Anti-platelet -  Aspirin  · Beta-blocker - continue labetalol  · Statin - continue atorvastatin  · Nitrate - p r n  Nitroglycerin  · Evaluation -   · Cardiology consultation appreciated  · Echocardiogram: Ejection fraction was estimated in the range of 55 % to 60 %  There was mild hypokinesis of the entire inferior wall(s)  There was possible hypokinesis of the basal-mid inferolateral wall(s)  grade 1 diastolic dysfunction  · Trops elevated with max 2 19, EKG without ischemic change  · Currently patient denies any chest pain symptoms, but poor historian    ESRD on hemodialysis [facility])  Assessment & Plan  · Nephrology managing, appreciate input  · Normal schedule of HD is TTS, HD holiday on 12/30, resumed on January 2nd  Getting an additional treatment today on Friday January 3rd prior to line holiday starting this weekend (will miss Saturday HD)    Essential hypertension  Assessment & Plan  · Continue medications/stable  · Monitor blood pressures  Chronic Aphasia / Cognitive impairment  Assessment & Plan  · History of stroke  · Supportive care  · Patient is a very limited historian  Swelling of extremity  Assessment & Plan  · Right arm swelling noted on exam- likely IV infiltration  · No clot noted on venous duplex of right arm     Oropharyngeal dysphagia  Assessment & Plan  · VBS during previous hospitalization recommended modified diet  · Patient noted to have facial grimace during evaluation but unfortunately she is so unreliable with her underlying cognitive impairment so I would suggest that we use phenol as needed but not pursue invasive ENT evaluation at this point  · SLP evaluated and recommend Dysphagia level 2 with nectar thickened liquids  · Aspiration precautions  · Continue to monitor    Continuous opioid dependence ([address] )  Assessment & Plan  · Continue home medications (fentanyl)    VTE Pharmacologic Prophylaxis:   Pharmacologic: Heparin  Mechanical VTE Prophylaxis in Place: Yes    Patient Centered Rounds: I have performed bedside rounds with nursing staff today  Discussions with Specialists or Other Care Team Provider: ID, case mgmt    Education and Discussions with Family / Patient: patient's daughter over the phone    Time Spent for Care: 30 minutes  More than 50% of total time spent on counseling and coordination of care as described above      Current Length of Stay: 5 day(s)    Current Patient Status: Inpatient   Certification Statement: The patient will continue to require additional inpatient hospital stay due to Needs clearing of bacteremia and replacement of dialysis catheter before she can leave    Discharge Plan:  Anticipate she will not be able to return to her nursing home until sometime in the middle of next week    Code Status: Level 3 - DNAR and DNI    Subjective:   Patient is a very poor historian due to aphasia  When asked if she has pain, answers yes and seems indicate it is in her arm  When asked if she has any difficulty swallowing or sore throat she does not answer  When asked if she has any back pain or shortness of breath she does not answer  Objective:     Vitals:   Temp (24hrs), Av 6 °F (37 °C), Min:98 3 °F (36 8 °C), Max:99 5 °F (37 5 °C)    Temp:  [98 3 °F (36 8 °C)-99 5 °F (37 5 °C)] 98 4 °F (36 9 °C)  HR:  [67-84] 69  Resp:  [16-20] 18  BP: (129-203)/() 181/61  SpO2:  [95 %-100 %] 95 %  Body mass index is 32 65 kg/m²  Input and Output Summary (last 24 hours): Intake/Output Summary (Last 24 hours) at 1/3/2020 0923  Last data filed at 1/3/2020 0820  Gross per 24 hour   Intake 525 ml   Output 1800 ml   Net -1275 ml       Physical Exam:     Physical Exam   Constitutional: No distress  Eyes: Right eye exhibits no discharge  Left eye exhibits no discharge  Cardiovascular: Normal rate and regular rhythm  Pulmonary/Chest: No respiratory distress  She has wheezes  No dyspnea,cough  Does have some wheezes noted   Abdominal: Soft  Bowel sounds are normal  She exhibits no distension  There is no tenderness  There is no guarding  Musculoskeletal: She exhibits edema (bilateral unchanged ankle edema) and tenderness (RUE edema, spasticity (prior stroke))  Neurological: She is alert  Awake alert but flat affect poor historian, poor eye contact  Only follows commands intermittently   Skin: Skin is warm and dry  No rash noted  She is not diaphoretic  No erythema  No pallor  Vitals reviewed        Additional Data:     Labs:    Results from last 7 days   Lab Units 01/03/20  0559 01/02/20  0518   WBC Thousand/uL 6 41 6 01   HEMOGLOBIN g/dL 9 4* 9 6*   HEMATOCRIT % 30 3* 31 0*   PLATELETS Thousands/uL 136* 118*   NEUTROS PCT %  --  59   LYMPHS PCT %  --  26   MONOS PCT %  --  10   EOS PCT %  --  5     Results from last 7 days   Lab Units 01/03/20  0559  12/29/19  0849   SODIUM mmol/L 134*   < > 139   POTASSIUM mmol/L 3 9   < > 4 6   CHLORIDE mmol/L 100   < > 101   CO2 mmol/L 22   < > 22   BUN mg/dL 33*   < > 68*   CREATININE mg/dL 4 45*   < > 7 25*   ANION GAP mmol/L 12   < > 16*   CALCIUM mg/dL 7 8*   < > 8 3   ALBUMIN g/dL  --   --  3 5   TOTAL BILIRUBIN mg/dL  --   --  0 73   ALK PHOS U/L  --   --  103   ALT U/L  --   --  57   AST U/L  --   --  58*   GLUCOSE RANDOM mg/dL 111   < > 141*    < > = values in this interval not displayed  Results from last 7 days   Lab Units 12/29/19  0849   INR  1 20*             Results from last 7 days   Lab Units 12/29/19  0849   LACTIC ACID mmol/L 0 9     * I Have Reviewed All Lab Data Listed Above  * Additional Pertinent Lab Tests Reviewed: All Labs Within Last 24 Hours Reviewed    Imaging:    Imaging Reports Reviewed Today Include:   Imaging Personally Reviewed by Myself Includes:      Recent Cultures (last 7 days):     Results from last 7 days   Lab Units 12/31/19  1020 12/31/19  0959 12/29/19  0907 12/29/19  0849   BLOOD CULTURE  No Growth at 48 hrs    --  Staphylococcus epidermidis* Staphylococcus epidermidis*   GRAM STAIN RESULT   --  Gram positive cocci in clusters* Gram positive cocci in clusters* Gram positive cocci in clusters*       Last 24 Hours Medication List:     Current Facility-Administered Medications:  acetaminophen 650 mg Oral Q6H PRN Elias Espinal, DO    albuterol 2 5 mg Nebulization Q6H PRN Jeri Bermudez MD    amLODIPine 10 mg Oral Daily Eloy Lloyd MD    aspirin 81 mg Oral Daily Elias Espinal, DO    atorvastatin 40 mg Oral QPM Bennie Santiago DO    benzonatate 100 mg Oral TID PRN Natalia Franklin NALINI Johnson    bisacodyl 5 mg Oral Daily PRN Jeimy Williamson,     busPIRone 5 mg Oral TID Jeimy Williamson,     cefazolin 3,000 mg Intravenous Once Art Layne MD    cefazolin 2,000 mg Intravenous After Dialysis Saint Lux, CRNP Last Rate: 2,000 mg (01/02/20 1216)   docusate sodium 100 mg Oral BID Jeimycharito Williamson, DO    doxercalciferol 1 mcg Intravenous After Dialysis Jeimycharito Williamson, DO    fentaNYL 1 patch Transdermal Q72H Jeimycharito Williamson, DO    heparin (porcine) 5,000 Units Subcutaneous Q8H [address] Christin Johnson PA-C    ipratropium 0 5 mg Nebulization Q6H Geovanna Jameson MD    ketorolac 1 drop Both Eyes 4x Daily Jeimy Williamson,     levalbuterol 1 25 mg Nebulization Q6H Geovanna Jameson MD    metoprolol tartrate 50 mg Oral Q12H [address] Patricia Zavaleta MD    nitroglycerin 0 4 mg Sublingual Q5 Min PRN Jeimy Williamson, DO    ondansetron 4 mg Intravenous Q6H PRN TIANA Clement    oseltamivir 30 mg Oral After Dialysis Araseli Ching MD    phenol 1 spray Mouth/Throat Q2H PRN Christin Johnson PA-C    polyethylene glycol 17 g Oral Daily Jeimy Williamson,     sertraline 50 mg Oral Daily Jeimy Williamson, DO    sevelamer 800 mg Oral TID With Meals Jeimy Williamson DO         Today, Patient Was Seen By: Amadou Oliver PA-C    ** Please Note: Dictation voice to text software may have been used in the creation of this document   **

## 2020-01-03 NOTE — PROGRESS NOTES
[address] [facility] NOTE   Princess Huynh 81 y o  female MRN: [id number]  Unit/Bed#: S -01 Encounter: [id number]  Reason for Consult: ESRD    ASSESSMENT and PLAN:    73-year-old female with a past medical history of ESRD, hypertension who initially presented with shortness of breath on 12/29  Krishna Pendleton had a recent admission on 12/27 for catheter malfunction which was replaced on 12/27   Patient was sent in with shortness of breath and was found to have influenza positive swab   Nephrology is on board for ESRD      1) ESRD -      - on HD at [facility] [address] is left IJ tunneled dialysis catheter  - HD prescription - Na 138, bicarb 30, F160, time 3 5 hours, EDW 87,   - HD on 12/30 due to holiday  -  patient had dialysis Thursday   -had dialysis 1/3-Friday-patient seen examined on dialysis and tolerating treatment   -the goal is for catheter removal today  -line holiday over the weekend  -possibly replace catheter early next week  - will need tamiflu after dialysis  - cefazolin will need to be redosed after dialysis   - pt is aphasic and daughter is POA  - f/u final blood cultures - staph epidermidis  Will need 2 week antibiotic course  F/u repeat cultures on 12/31 which are also positive 1/2 bottles  Will need repeat cultures per the infectious disease team   - RUE edema - unclear etiology  Duplex unrevealing     Reviewed with patient's daughter at the bedside  Patient's daughter is concerned that the patient's overall health is declining and was questioning the current treatment options  Reviewed the options with the patient over the phone yesterday, but the patient daughter stated that she does not remember everything we discussed that she is also ill  We re-reviewed and also Infectious Disease attending was present during the latter part of the conversation  The daughter is in agreement for the catheter removal and potential replacement next week    But the daughter is very clear that she wants to have a family discussion with all of the team members to discuss the next steps  I have reviewed this with the primary team attending      2) electrolytes - stable     3) trop elevation     - per Primary Team and Cardiology team  - was on hep gtt  - f/u ECHO - EF 55-60, mild hypokinesis of entire inferior wall, possible hypokinesis of basal-mid inferolateral walls, grade I dCHF     4) anemia - Hb stable     5) HTN     - monitor for hypotension     6) MBD -  Cont phos binder and hecterol     7) influenza - tamiflu dose adjusted for renal dosing     8) bacteremia     - cultures from admission positive for GPC  - ID on board       SUBJECTIVE / INTERVAL HISTORY:    Patient seen on dialysis  Was comfortable during the treatment  After dialysis ended, patient was moaning and in pain      OBJECTIVE:  Current Weight: Weight - Scale: 83 6 kg (184 lb 4 9 oz)  Vitals:    01/03/20 1100 01/03/20 1130 01/03/20 1150 01/03/20 1200   BP: 153/72 116/81 154/69 134/54   BP Location:    Right arm   Pulse: 72 87 74 84   Resp: 18 18 18 18   Temp:    98 2 °F (36 8 °C)   TempSrc:    Oral   SpO2:       Weight:       Height:           Intake/Output Summary (Last 24 hours) at 1/3/2020 1228  Last data filed at 1/3/2020 1150  Gross per 24 hour   Intake 825 ml   Output 1500 ml   Net -675 ml     General: NAD  Skin: no rash  Eyes: anicteric sclera  ENT: dry mucous membrane  Neck: supple  Chest:  Diminished air intake bilateral  CVS: s1s2, no murmur, no gallop, no rub  Abdomen: soft, nontender, nl sounds  Extremities: no significant edema LE b/l  : no ambrosio  Neuro:cannot assess  Psych: normal affect    Medications:    Current Facility-Administered Medications:     acetaminophen (TYLENOL) tablet 650 mg, 650 mg, Oral, Q6H PRN, Elvis Jenkins DO, 650 mg at 01/03/20 1224    albuterol inhalation solution 2 5 mg, 2 5 mg, Nebulization, Q6H PRN, Robby Bhat MD    amLODIPine (NORVASC) tablet 10 mg, 10 mg, Oral, Daily, Laurel Mckay MD, Stopped at 01/03/20 1028    aspirin chewable tablet 81 mg, 81 mg, Oral, Daily, Eileen Hernandezing, DO, 81 mg at 01/03/20 0915    atorvastatin (LIPITOR) tablet 40 mg, 40 mg, Oral, QPM, Marcelyn Moulding, DO, 40 mg at 01/02/20 1741    benzonatate (TESSALON PERLES) capsule 100 mg, 100 mg, Oral, TID PRN, Christin Johnson PA-C, 100 mg at 01/03/20 0020    bisacodyl (DULCOLAX) EC tablet 5 mg, 5 mg, Oral, Daily PRN, Eileen Conde, DO, 5 mg at 12/30/19 1153    busPIRone (BUSPAR) tablet 5 mg, 5 mg, Oral, TID, Marcelyn Moulding, DO, 5 mg at 01/03/20 0915    ceFAZolin (ANCEF) IVPB (premix) 2,000 mg, 2,000 mg, Intravenous, After Dialysis, TIANA Long, Last Rate: 100 mL/hr at 01/02/20 1216, 2,000 mg at 01/02/20 1216    docusate sodium (COLACE) capsule 100 mg, 100 mg, Oral, BID, Eileen Conde DO, Stopped at 01/03/20 1028    doxercalciferol (HECTOROL) injection 1 mcg, 1 mcg, Intravenous, After Dialysis, Eileen Conde DO, 1 mcg at 01/03/20 1000    fentaNYL (DURAGESIC) 25 mcg/hr TD 72 hr patch 1 patch, 1 patch, Transdermal, Q72H, Eileen Conde DO, 1 patch at 01/01/20 1956    heparin (porcine) subcutaneous injection 5,000 Units, 5,000 Units, Subcutaneous, Q8H [address], Christin Johnson PA-C, 5,000 Units at 01/03/20 0552    ipratropium (ATROVENT) 0 02 % inhalation solution 0 5 mg, 0 5 mg, Nebulization, Q6H, Fabienne Khanna MD, 0 5 mg at 01/03/20 0726    ketorolac (ACULAR) 0 5 % ophthalmic solution 1 drop, 1 drop, Both Eyes, 4x Daily, Eileen Conde, DO, 1 drop at 01/03/20 1100    levalbuterol (XOPENEX) inhalation solution 1 25 mg, 1 25 mg, Nebulization, Q6H, Fabienne Khanna MD, 1 25 mg at 01/03/20 0726    metoprolol tartrate (LOPRESSOR) tablet 50 mg, 50 mg, Oral, Q12H [address], Darling Phillips MD, Stopped at 01/03/20 1028    nitroglycerin (NITROSTAT) SL tablet 0 4 mg, 0 4 mg, Sublingual, Q5 Min PRN, Eileen Conde,     ondansetron [facility]) injection 4 mg, 4 mg, Intravenous, Q6H PRN, TIANA Hilton, 4 mg at 12/31/19 1318    oseltamivir (TAMIFLU) capsule 30 mg, 30 mg, Oral, After Dialysis, Rubia Weber MD, 30 mg at 01/02/20 1153    phenol (CHLORASEPTIC) 1 4 % mucosal liquid 1 spray, 1 spray, Mouth/Throat, Q2H PRN, Christin Johnson PA-C    polyethylene glycol (MIRALAX) packet 17 g, 17 g, Oral, Daily, Oksana Hopson DO, Stopped at 01/03/20 1028    sertraline (ZOLOFT) tablet 50 mg, 50 mg, Oral, Daily, Oksana Hopson DO, 50 mg at 01/03/20 0915    sevelamer (RENAGEL) tablet 800 mg, 800 mg, Oral, TID With Meals, Oksana Hopson DO, 800 mg at 01/02/20 1742    Laboratory Results:  Results from last 7 days   Lab Units 01/03/20  0559 01/02/20  0518 01/01/20  0836 12/31/19  1001 12/30/19  0837 12/29/19  0849 12/28/19  0839   WBC Thousand/uL 6 41 6 01 5 08 5 69 4 37 9 65 7 36   HEMOGLOBIN g/dL 9 4* 9 6* 9 7* 10 2* 10 6* 11 3* 10 2*   HEMATOCRIT % 30 3* 31 0* 31 0* 32 6* 33 8* 37 8 34 2*   PLATELETS Thousands/uL 136* 118* 129* 127* 133* 147* 137*   POTASSIUM mmol/L 3 9 4 0 3 9 3 4* 3 7 4 6 4 1   CHLORIDE mmol/L 100 100 99* 99* 99* 101 101   CO2 mmol/L 22 22 24 23 25 22 24   BUN mg/dL 33* 53* 45* 36* 48* 68* 49*   CREATININE mg/dL 4 45* 6 38* 5 52* 4 42* 5 06* 7 25* 5 88*   CALCIUM mg/dL 7 8* 7 6* 7 7* 7 7* 7 7* 8 3 8 2*   MAGNESIUM mg/dL  --   --   --   --   --  2 5  --

## 2020-01-03 NOTE — PLAN OF CARE
Removing 1L in a 3 5hr dialysis today per physician order  Usual Saturday tx rescheduled to today for catheter removal post hd today       Problem: METABOLIC, FLUID AND ELECTROLYTES - ADULT  Goal: Electrolytes maintained within normal limits  Description  INTERVENTIONS:  - Monitor labs and assess patient for signs and symptoms of electrolyte imbalances  - Administer electrolyte replacement as ordered  - Monitor response to electrolyte replacements, including repeat lab results as appropriate  - Instruct patient on fluid and nutrition as appropriate  Outcome: Progressing     Problem: METABOLIC, FLUID AND ELECTROLYTES - ADULT  Goal: Fluid balance maintained  Description  INTERVENTIONS:  - Monitor labs   - Monitor I/O and WT  - Instruct patient on fluid and nutrition as appropriate  - Assess for signs & symptoms of volume excess or deficit  Outcome: Progressing

## 2020-01-03 NOTE — ASSESSMENT & PLAN NOTE
· Arrived with shortness of breath from dialysis center  · Influenza A (+), CXR negative  · Tamiflu dosed after HD to complete a course x 5 days  · Mucinex BID, Tessalon Perles  · P r n  Robitussin DM  · Xopenex nebulizers for wheezing

## 2020-01-04 PROBLEM — Z71.89 GOALS OF CARE, COUNSELING/DISCUSSION: Status: ACTIVE | Noted: 2020-01-04

## 2020-01-04 LAB
ANION GAP SERPL CALCULATED.3IONS-SCNC: 10 MMOL/L (ref 4–13)
BUN SERPL-MCNC: 22 MG/DL (ref 5–25)
CALCIUM SERPL-MCNC: 8.2 MG/DL (ref 8.3–10.1)
CHLORIDE SERPL-SCNC: 100 MMOL/L (ref 100–108)
CO2 SERPL-SCNC: 24 MMOL/L (ref 21–32)
CREAT SERPL-MCNC: 3.56 MG/DL (ref 0.6–1.3)
GFR SERPL CREATININE-BSD FRML MDRD: 13 ML/MIN/1.73SQ M
GLUCOSE SERPL-MCNC: 96 MG/DL (ref 65–140)
POTASSIUM SERPL-SCNC: 4.2 MMOL/L (ref 3.5–5.3)
SODIUM SERPL-SCNC: 134 MMOL/L (ref 136–145)

## 2020-01-04 PROCEDURE — 94640 AIRWAY INHALATION TREATMENT: CPT

## 2020-01-04 PROCEDURE — 99232 SBSQ HOSP IP/OBS MODERATE 35: CPT | Performed by: INTERNAL MEDICINE

## 2020-01-04 PROCEDURE — 94760 N-INVAS EAR/PLS OXIMETRY 1: CPT

## 2020-01-04 PROCEDURE — 99232 SBSQ HOSP IP/OBS MODERATE 35: CPT | Performed by: PHYSICIAN ASSISTANT

## 2020-01-04 PROCEDURE — 80048 BASIC METABOLIC PNL TOTAL CA: CPT | Performed by: INTERNAL MEDICINE

## 2020-01-04 RX ORDER — CEFAZOLIN SODIUM 2 G/50ML
2000 SOLUTION INTRAVENOUS ONCE
Status: DISCONTINUED | OUTPATIENT
Start: 2020-01-06 | End: 2020-01-06

## 2020-01-04 RX ORDER — LEVALBUTEROL 1.25 MG/.5ML
1.25 SOLUTION, CONCENTRATE RESPIRATORY (INHALATION)
Status: DISCONTINUED | OUTPATIENT
Start: 2020-01-04 | End: 2020-01-05

## 2020-01-04 RX ADMIN — KETOROLAC TROMETHAMINE 1 DROP: 5 SOLUTION OPHTHALMIC at 13:00

## 2020-01-04 RX ADMIN — HEPARIN SODIUM 5000 UNITS: 5000 INJECTION INTRAVENOUS; SUBCUTANEOUS at 05:37

## 2020-01-04 RX ADMIN — METOPROLOL TARTRATE 50 MG: 50 TABLET, FILM COATED ORAL at 23:58

## 2020-01-04 RX ADMIN — BUSPIRONE HYDROCHLORIDE 5 MG: 5 TABLET ORAL at 23:58

## 2020-01-04 RX ADMIN — IPRATROPIUM BROMIDE 0.5 MG: 0.5 SOLUTION RESPIRATORY (INHALATION) at 20:08

## 2020-01-04 RX ADMIN — HEPARIN SODIUM 5000 UNITS: 5000 INJECTION INTRAVENOUS; SUBCUTANEOUS at 13:44

## 2020-01-04 RX ADMIN — HEPARIN SODIUM 5000 UNITS: 5000 INJECTION INTRAVENOUS; SUBCUTANEOUS at 23:30

## 2020-01-04 RX ADMIN — IPRATROPIUM BROMIDE 0.5 MG: 0.5 SOLUTION RESPIRATORY (INHALATION) at 13:17

## 2020-01-04 RX ADMIN — LEVALBUTEROL 1.25 MG: 1.25 SOLUTION, CONCENTRATE RESPIRATORY (INHALATION) at 13:17

## 2020-01-04 RX ADMIN — KETOROLAC TROMETHAMINE 1 DROP: 5 SOLUTION OPHTHALMIC at 17:36

## 2020-01-04 RX ADMIN — BUSPIRONE HYDROCHLORIDE 5 MG: 5 TABLET ORAL at 17:36

## 2020-01-04 RX ADMIN — ACETAMINOPHEN 650 MG: 325 TABLET, FILM COATED ORAL at 10:51

## 2020-01-04 RX ADMIN — ATORVASTATIN CALCIUM 40 MG: 40 TABLET, FILM COATED ORAL at 17:36

## 2020-01-04 RX ADMIN — FENTANYL 1 PATCH: 25 PATCH TRANSDERMAL at 19:50

## 2020-01-04 RX ADMIN — KETOROLAC TROMETHAMINE 1 DROP: 5 SOLUTION OPHTHALMIC at 08:42

## 2020-01-04 RX ADMIN — LEVALBUTEROL 1.25 MG: 1.25 SOLUTION, CONCENTRATE RESPIRATORY (INHALATION) at 08:07

## 2020-01-04 RX ADMIN — IPRATROPIUM BROMIDE 0.5 MG: 0.5 SOLUTION RESPIRATORY (INHALATION) at 01:15

## 2020-01-04 RX ADMIN — METOPROLOL TARTRATE 50 MG: 50 TABLET, FILM COATED ORAL at 08:33

## 2020-01-04 RX ADMIN — IPRATROPIUM BROMIDE 0.5 MG: 0.5 SOLUTION RESPIRATORY (INHALATION) at 08:07

## 2020-01-04 RX ADMIN — LEVALBUTEROL 1.25 MG: 1.25 SOLUTION, CONCENTRATE RESPIRATORY (INHALATION) at 20:08

## 2020-01-04 RX ADMIN — LEVALBUTEROL HYDROCHLORIDE 1.25 MG: 1.25 SOLUTION, CONCENTRATE RESPIRATORY (INHALATION) at 01:15

## 2020-01-04 NOTE — PLAN OF CARE
Problem: Potential for Falls  Goal: Patient will remain free of falls  Description  INTERVENTIONS:  - Assess patient frequently for physical needs  -  Identify cognitive and physical deficits and behaviors that affect risk of falls    -  [place] fall precautions as indicated by assessment   - Educate patient/family on patient safety including physical limitations  - Instruct patient to call for assistance with activity based on assessment  - Modify environment to reduce risk of injury  - Consider OT/PT consult to assist with strengthening/mobility  Outcome: Progressing     Problem: Prexisting or High Potential for Compromised Skin Integrity  Goal: Skin integrity is maintained or improved  Description  INTERVENTIONS:  - Identify patients at risk for skin breakdown  - Assess and monitor skin integrity  - Assess and monitor nutrition and hydration status  - Monitor labs   - Assess for incontinence   - Turn and reposition patient  - Assist with mobility/ambulation  - Relieve pressure over bony prominences  - Avoid friction and shearing  - Provide appropriate hygiene as needed including keeping skin clean and dry  - Evaluate need for skin moisturizer/barrier cream  - Collaborate with interdisciplinary team   - Patient/family teaching  - Consider wound care consult   Outcome: Progressing     Problem: METABOLIC, FLUID AND ELECTROLYTES - ADULT  Goal: Electrolytes maintained within normal limits  Description  INTERVENTIONS:  - Monitor labs and assess patient for signs and symptoms of electrolyte imbalances  - Administer electrolyte replacement as ordered  - Monitor response to electrolyte replacements, including repeat lab results as appropriate  - Instruct patient on fluid and nutrition as appropriate  Outcome: Progressing  Goal: Fluid balance maintained  Description  INTERVENTIONS:  - Monitor labs   - Monitor I/O and WT  - Instruct patient on fluid and nutrition as appropriate  - Assess for signs & symptoms of volume excess or deficit  Outcome: Progressing     Problem: Nutrition/Hydration-ADULT  Goal: Nutrient/Hydration intake appropriate for improving, restoring or maintaining nutritional needs  Description  Monitor and assess patient's nutrition/hydration status for malnutrition  Collaborate with interdisciplinary team and initiate plan and interventions as ordered  Monitor patient's weight and dietary intake as ordered or per policy  Utilize nutrition screening tool and intervene as necessary  Determine patient's food preferences and provide high-protein, high-caloric foods as appropriate       INTERVENTIONS:  - Monitor oral intake, urinary output, labs, and treatment plans  - Assess nutrition and hydration status and recommend course of action  - Evaluate amount of meals eaten  - Assist patient with eating if necessary   - Allow adequate time for meals  - Recommend/ encourage appropriate diets, oral nutritional supplements, and vitamin/mineral supplements  - Order, calculate, and assess calorie counts as needed  - Recommend, monitor, and adjust tube feedings and TPN/PPN based on assessed needs  - Assess need for intravenous fluids  - Provide specific nutrition/hydration education as appropriate  - Include patient/family/caregiver in decisions related to nutrition  Outcome: Progressing

## 2020-01-04 NOTE — PLAN OF CARE
Problem: Potential for Falls  Goal: Patient will remain free of falls  Description  INTERVENTIONS:  - Assess patient frequently for physical needs  -  Identify cognitive and physical deficits and behaviors that affect risk of falls    -  [place] fall precautions as indicated by assessment   - Educate patient/family on patient safety including physical limitations  - Instruct patient to call for assistance with activity based on assessment  - Modify environment to reduce risk of injury  - Consider OT/PT consult to assist with strengthening/mobility  Outcome: Progressing     Problem: Prexisting or High Potential for Compromised Skin Integrity  Goal: Skin integrity is maintained or improved  Description  INTERVENTIONS:  - Identify patients at risk for skin breakdown  - Assess and monitor skin integrity  - Assess and monitor nutrition and hydration status  - Monitor labs   - Assess for incontinence   - Turn and reposition patient  - Assist with mobility/ambulation  - Relieve pressure over bony prominences  - Avoid friction and shearing  - Provide appropriate hygiene as needed including keeping skin clean and dry  - Evaluate need for skin moisturizer/barrier cream  - Collaborate with interdisciplinary team   - Patient/family teaching  - Consider wound care consult   Outcome: Progressing     Problem: METABOLIC, FLUID AND ELECTROLYTES - ADULT  Goal: Electrolytes maintained within normal limits  Description  INTERVENTIONS:  - Monitor labs and assess patient for signs and symptoms of electrolyte imbalances  - Administer electrolyte replacement as ordered  - Monitor response to electrolyte replacements, including repeat lab results as appropriate  - Instruct patient on fluid and nutrition as appropriate  Outcome: Progressing  Goal: Fluid balance maintained  Description  INTERVENTIONS:  - Monitor labs   - Monitor I/O and WT  - Instruct patient on fluid and nutrition as appropriate  - Assess for signs & symptoms of volume excess or deficit  Outcome: Progressing     Problem: Nutrition/Hydration-ADULT  Goal: Nutrient/Hydration intake appropriate for improving, restoring or maintaining nutritional needs  Description  Monitor and assess patient's nutrition/hydration status for malnutrition  Collaborate with interdisciplinary team and initiate plan and interventions as ordered  Monitor patient's weight and dietary intake as ordered or per policy  Utilize nutrition screening tool and intervene as necessary  Determine patient's food preferences and provide high-protein, high-caloric foods as appropriate       INTERVENTIONS:  - Monitor oral intake, urinary output, labs, and treatment plans  - Assess nutrition and hydration status and recommend course of action  - Evaluate amount of meals eaten  - Assist patient with eating if necessary   - Allow adequate time for meals  - Recommend/ encourage appropriate diets, oral nutritional supplements, and vitamin/mineral supplements  - Order, calculate, and assess calorie counts as needed  - Recommend, monitor, and adjust tube feedings and TPN/PPN based on assessed needs  - Assess need for intravenous fluids  - Provide specific nutrition/hydration education as appropriate  - Include patient/family/caregiver in decisions related to nutrition  Outcome: Not Progressing

## 2020-01-04 NOTE — PROGRESS NOTES
[facility]  Progress Note - Felecia Sánchez 1935, 81 y o  female MRN: [id number]    Unit/Bed#: S -01 Encounter: [id number]    Primary Care Provider: Laurie Dias MD   Date and time admitted to hospital: 12/29/2019  7:21 AM        * Gram-positive bacteremia  Assessment & Plan  · POA, both blood cultures on admission positive for staph epidermidis  Repeat cultures negative at 24 hours so far  · ID consult appreciated  · Continue with IV Ancef  · Follow up with repeat blood cultures   · Replace PermCath Monday if culture remain negative  · Will need 4 weeks of IV antibiotics   · Daughter wants to continue with treatment for now     Influenza A  Assessment & Plan  · POA, arrived with shortness of breath from dialysis center  Flu A positive  Status post 5 days Tamiflu  · Continue with supportive care   · Has been afebrile       ESRD on hemodialysis [facility])  Assessment & Plan  · Nephrology managing, appreciate input  Normal schedule of HD is TTS, HD holiday on 12/30, resumed on January 2nd  Getting an additional treatment today on Friday January 3rd prior to line holiday starting this weekend (will miss Saturday HD)  · She can get a PermCath replaced on Monday if her cultures remain negative     Essential hypertension  Assessment & Plan  · BP acceptable   · Continue medications     Continuous opioid dependence (HCC)  Assessment & Plan  · Continue home medications (fentanyl)    Chronic Aphasia / Cognitive impairment  Assessment & Plan  · History of stroke  · Supportive care  · Patient is a very limited historian  · As per daughter she has a will to live  Daughter aware little will change in her functioning  Goals of care, counseling/discussion  Assessment & Plan  · I had a long discussion with the patient's daughter concerning her mother's condition and the "next steps"   I discussed with her that if we do not treat this infection or give her hemodialysis that this would undoubtedly lead to her demise  The patient's daughter states that her mother has a will to live despite her (the daughter) not understanding why she would want to continue like this, but she knows that this is what her mother would want  Thus, will continue with IV antibiotics and attempt to get patient back on hemodialysis  Patient's daughter reiterates that she is a level 3 and would not want artificial nutrition should it make it there  I made her aware that the patient is not eating well at this time  · Continue to evaluate patient input  · Revisit goals of care if patient does not show improvement of appetite  Oropharyngeal dysphagia  Assessment & Plan  · VBS during previous hospitalization recommended modified diet  Patient noted to have facial grimace during evaluation but unfortunately she is so unreliable with her underlying cognitive impairment so I would suggest that we use phenol as needed but not pursue invasive ENT evaluation at this point  · SLP evaluated and recommend Dysphagia level 2 with nectar thickened liquids  · Aspiration precautions  · Continue to monitor      VTE Pharmacologic Prophylaxis:   Pharmacologic: Heparin  Mechanical VTE Prophylaxis in Place: Yes    Patient Centered Rounds: I have performed bedside rounds with nursing staff today  Discussions with Specialists or Other Care Team Provider: Discussed with RN, CM    Education and Discussions with Family / Patient: Discussed with daughter over the phone     Time Spent for Care: 30 minutes  More than 50% of total time spent on counseling and coordination of care as described above      Current Length of Stay: 6 day(s)    Current Patient Status: Inpatient   Certification Statement: The patient will continue to require additional inpatient hospital stay due to on going IV antibiotics, pending replacement of PermCath    Discharge Plan: Pending hemodialysis plan, further GOC discussion     Code Status: Level 3 - DNAR and DNI      Subjective: Patient unable to provide much history  Reports arm pain  Unable to provide much other history  RN and PCA reports that patient not eating her meals well  Objective:     Vitals:   Temp (24hrs), Av 4 °F (37 4 °C), Min:99 2 °F (37 3 °C), Max:99 6 °F (37 6 °C)    Temp:  [99 2 °F (37 3 °C)-99 6 °F (37 6 °C)] 99 6 °F (37 6 °C)  HR:  [72-91] 85  Resp:  [16-18] 16  BP: (122-147)/(49-63) 147/63  SpO2:  [93 %-99 %] 97 %  Body mass index is 32 26 kg/m²  Input and Output Summary (last 24 hours): Intake/Output Summary (Last 24 hours) at 2020 1503  Last data filed at 1/3/2020 2100  Gross per 24 hour   Intake 100 ml   Output    Net 100 ml       Physical Exam:     Physical Exam   Constitutional: Vital signs are normal  She appears well-developed and well-nourished  Non-toxic appearance  She appears ill  No distress  HENT:   Head: Normocephalic and atraumatic  Mouth/Throat: Mucous membranes are dry  Eyes: Pupils are equal, round, and reactive to light  Conjunctivae and EOM are normal  No scleral icterus  Pupils are equal    Neck: Neck supple  Cardiovascular: Normal rate, regular rhythm, S1 normal, S2 normal, normal heart sounds and intact distal pulses  Exam reveals no S3 and no S4  No murmur heard  Pulmonary/Chest: Effort normal and breath sounds normal  No accessory muscle usage or stridor  No respiratory distress  She has no decreased breath sounds  She has no wheezes  She has no rhonchi  She has no rales  She exhibits no tenderness  Abdominal: Soft  Bowel sounds are normal  She exhibits no distension and no mass  There is no tenderness  There is no rigidity, no rebound and no guarding  Neurological: She is alert  Skin: Skin is warm and dry  No erythema  Psychiatric: She is noncommunicative         Additional Data:     Labs:    Results from last 7 days   Lab Units 20  0559 20  0518   WBC Thousand/uL 6 41 6 01   HEMOGLOBIN g/dL 9 4* 9 6*   HEMATOCRIT % 30 3* 31 0* PLATELETS Thousands/uL 136* 118*   NEUTROS PCT %  --  59   LYMPHS PCT %  --  26   MONOS PCT %  --  10   EOS PCT %  --  5     Results from last 7 days   Lab Units 01/04/20  0540  12/29/19  0849   POTASSIUM mmol/L 4 2   < > 4 6   CHLORIDE mmol/L 100   < > 101   CO2 mmol/L 24   < > 22   BUN mg/dL 22   < > 68*   CREATININE mg/dL 3 56*   < > 7 25*   CALCIUM mg/dL 8 2*   < > 8 3   ALK PHOS U/L  --   --  103   ALT U/L  --   --  57   AST U/L  --   --  58*    < > = values in this interval not displayed  Results from last 7 days   Lab Units 12/29/19  0849   INR  1 20*       * I Have Reviewed All Lab Data Listed Above  * Additional Pertinent Lab Tests Reviewed: Gloria 66 Admission Reviewed    Imaging:    Imaging Reports Reviewed Today Include: None  Imaging Personally Reviewed by Myself Includes:  None    Recent Cultures (last 7 days):     Results from last 7 days   Lab Units 01/03/20  0622 01/03/20  0611 12/31/19  1020 12/31/19  0959 12/29/19  0907 12/29/19  0849   BLOOD CULTURE  No Growth at 24 hrs  No Growth at 24 hrs  No Growth After 4 Days   Staphylococcus epidermidis* Staphylococcus epidermidis* Staphylococcus epidermidis*   GRAM STAIN RESULT   --   --   --  Gram positive cocci in clusters* Gram positive cocci in clusters* Gram positive cocci in clusters*       Last 24 Hours Medication List:     Current Facility-Administered Medications:  acetaminophen 650 mg Oral Q6H PRN Latanya Cox DO   albuterol 2 5 mg Nebulization Q6H PRN Von Duenas MD   amLODIPine 10 mg Oral Daily Bethann Hammans, MD   aspirin 81 mg Oral Daily Latanya Cox DO   atorvastatin 40 mg Oral QPM Latanya Cox DO   benzonatate 100 mg Oral TID PRN Mary Shah PA-C   bisacodyl 5 mg Oral Daily PRN Latanya Cox DO   busPIRone 5 mg Oral TID Latanya Cox DO   [START ON 1/6/2020] cefazolin 2,000 mg Intravenous Once Dominick Anderson MD   docusate sodium 100 mg Oral BID Latanya Cox DO   doxercalciferol 1 mcg Intravenous After Dialysis Sherill Scot, DO   fentaNYL 1 patch Transdermal Q72H Sherill Scot, DO   heparin (porcine) 5,000 Units Subcutaneous Q8H [address] Christin Johnson PA-C   ipratropium 0 5 mg Nebulization TID Desirae Mera MD   ketorolac 1 drop Both Eyes 4x Daily [facility], [facility]   levalbuterol 1 25 mg Nebulization TID Desirae Mera MD   metoprolol tartrate 50 mg Oral Q12H [address] Kandace Lam MD   nitroglycerin 0 4 mg Sublingual Q5 Min PRN Sherill Scot, DO   ondansetron 4 mg Intravenous Q6H PRN TIANA Guzmán   phenol 1 spray Mouth/Throat Q2H PRN Christin Johnson PA-C   polyethylene glycol 17 g Oral Daily Sherill Scot, DO   sertraline 50 mg Oral Daily [facility], DO   sevelamer 800 mg Oral TID With Meals [facility], DO        Today, Patient Was Seen By: Boyd Tapia PA-C    ** Please Note: Dictation voice to text software may have been used in the creation of this document   **

## 2020-01-04 NOTE — ASSESSMENT & PLAN NOTE
· POA, arrived with shortness of breath from dialysis center  Flu A positive   Status post 5 days Tamiflu  · Continue with supportive care   · Has been afebrile

## 2020-01-04 NOTE — PROGRESS NOTES
[address] [facility] NOTE   Saima Butler 81 y o  female MRN: [id number]  Unit/Bed#: S -01 Encounter: [id number]  Reason for Consult: ESRD    ASSESSMENT and PLAN:    31-year-old female with a past medical history of ESRD, hypertension who initially presented with shortness of breath on 12/29  Lizz Rosales had a recent admission on 12/27 for catheter malfunction which was replaced on 12/27   Patient was sent in with shortness of breath and was found to have influenza positive swab   Nephrology is on board for ESRD      1) ESRD -      - on HD at \[facility]"" [address] is left IJ tunneled dialysis catheter  - HD prescription - Na 138, bicarb 30, F160, time 3 5 hours, EDW 87,   - HD on 12/30 due to holiday  -  patient had dialysis Thursday   -had dialysis 1/3-Friday-patient seen examined on dialysis and tolerating treatment   -dialysis catheter removed on 01/03  -line holiday over the weekend  -possibly replace catheter early next week  -Tamiflu and Cefazolin dosing per infectious disease team  - pt is aphasic and daughter is POA  - f/u final blood cultures - staph epidermidis  Will need 2 week antibiotic course  F/u repeat cultures on 12/31 which are also positive 1/2 bottles  Repeat cultures from 01/03 in progress   Will need repeat cultures per the infectious disease team   - RUE edema - unclear etiology  Duplex unrevealing   -next lab work can be on Monday     Reviewed with patient's daughter at the bedside  Patient's daughter is concerned that the patient's overall health is declining and was questioning the current treatment options  Reviewed the options with the patient over the phone yesterday, but the patient daughter stated that she does not remember everything we discussed that she is also ill  We re-reviewed and also Infectious Disease attending was present during the latter part of the conversation    The daughter is in agreement for the catheter removal and potential replacement next week   But the daughter is very clear that she wants to have a family discussion with all of the team members to discuss the next steps  I have reviewed this with the primary team attending  --> I reviewed with the primary team attending about this yesterday and they will evaluate and discuss further with the family     2) electrolytes - stable     3) trop elevation     - per Primary Team and Cardiology team  - was on hep gtt  - f/u [address]- EF 55-60, mild hypokinesis of entire inferior wall, possible hypokinesis of basal-mid inferolateral walls, grade I dCHF     4) anemia - Hb stable     5) HTN     - monitor for hypotension     6) MBD -  Cont phos binder and hecterol     7) influenza - tamiflu dose adjusted for renal dosing     8) bacteremia     - cultures from admission positive for GPC  - ID on board  -see above    SUBJECTIVE / INTERVAL HISTORY:  Patient is still in pain  Aphasic      OBJECTIVE:  Current Weight: Weight - Scale: 82 6 kg (182 lb 1 6 oz)  Vitals:    01/03/20 1944 01/03/20 2225 01/04/20 0115 01/04/20 0600   BP:  (!) 122/49     BP Location:  Right arm     Pulse:  91 72    Resp:  17 16    Temp:  99 2 °F (37 3 °C)     TempSrc:  Oral     SpO2: 99% 93% 94%    Weight:    82 6 kg (182 lb 1 6 oz)   Height:           Intake/Output Summary (Last 24 hours) at 1/4/2020 5405  Last data filed at 1/3/2020 2100  Gross per 24 hour   Intake 660 ml   Output 1500 ml   Net -840 ml     General: NAD  Skin: no rash  Eyes: anicteric sclera  ENT: dry mucous membrane  Neck: supple  Chest: diminished air intake bases  CVS: s1s2, no murmur, no gallop, no rub  Abdomen: soft, nontender, nl sounds  Extremities: no edema LE b/l  : no ambrosio  Neuro: diff to assess  Psych: diff to assess    Medications:    Current Facility-Administered Medications:     acetaminophen (TYLENOL) tablet 650 mg, 650 mg, Oral, Q6H PRN, Yves Apley, DO, 650 mg at 01/03/20 1224    albuterol inhalation solution 2 5 mg, 2 5 mg, Nebulization, Q6H PRN, Anna Carrasquillo MD    amLODIPine (NORVASC) tablet 10 mg, 10 mg, Oral, Daily, Codi Petit MD, Stopped at 01/03/20 1028    aspirin chewable tablet 81 mg, 81 mg, Oral, Daily, Leveda Katerina, DO, 81 mg at 01/03/20 0915    atorvastatin (LIPITOR) tablet 40 mg, 40 mg, Oral, QPM, Leveda Katerina, DO, 40 mg at 01/03/20 1749    benzonatate (TESSALON PERLES) capsule 100 mg, 100 mg, Oral, TID PRN, Christin Johnson PA-C, 100 mg at 01/03/20 0020    bisacodyl (DULCOLAX) EC tablet 5 mg, 5 mg, Oral, Daily PRN, Leveda Katerina, DO, 5 mg at 12/30/19 1153    busPIRone (BUSPAR) tablet 5 mg, 5 mg, Oral, TID, Leveda Katerina, DO, 5 mg at 01/03/20 2232    ceFAZolin (ANCEF) IVPB (premix) 2,000 mg, 2,000 mg, Intravenous, After Dialysis, Joel Signs, CRNP, Last Rate: 100 mL/hr at 01/02/20 1216, 2,000 mg at 01/02/20 1216    docusate sodium (COLACE) capsule 100 mg, 100 mg, Oral, BID, Leveda Katerina, DO, 100 mg at 01/03/20 1749    doxercalciferol (HECTOROL) injection 1 mcg, 1 mcg, Intravenous, After Dialysis, Leveda Katerina, DO, 1 mcg at 01/03/20 1000    fentaNYL (DURAGESIC) 25 mcg/hr TD 72 hr patch 1 patch, 1 patch, Transdermal, Q72H, Leveda Katerina, DO, 1 patch at 01/01/20 1956    heparin (porcine) subcutaneous injection 5,000 Units, 5,000 Units, Subcutaneous, Q8H [facility], Christin Johnson PA-C, 5,000 Units at 01/04/20 0537    ipratropium (ATROVENT) 0 02 % inhalation solution 0 5 mg, 0 5 mg, Nebulization, TID, Arron Mills MD    ketorolac (ACULAR) 0 5 % ophthalmic solution 1 drop, 1 drop, Both Eyes, 4x Daily, Darcy Borges DO, 1 drop at 01/03/20 2232    levalbuterol (XOPENEX) inhalation solution 1 25 mg, 1 25 mg, Nebulization, TID, Arron Mills MD    metoprolol tartrate (LOPRESSOR) tablet 50 mg, 50 mg, Oral, Q12H [facility], Uyen Segovia MD, 50 mg at 01/03/20 2232    nitroglycerin (NITROSTAT) SL tablet 0 4 mg, 0 4 mg, Sublingual, Q5 Min PRN, Darcy Borges DO    ondansetron [facility]) injection 4 mg, 4 mg, Intravenous, Q6H PRN, TIANA Guzmán, 4 mg at 12/31/19 1318    phenol (CHLORASEPTIC) 1 4 % mucosal liquid 1 spray, 1 spray, Mouth/Throat, Q2H PRN, Christin Johnson PA-C    polyethylene glycol (MIRALAX) packet 17 g, 17 g, Oral, Daily, Elvis Jenkins DO, Stopped at 01/03/20 1028    sertraline (ZOLOFT) tablet 50 mg, 50 mg, Oral, Daily, Elvis Jenkins DO, 50 mg at 01/03/20 0915    sevelamer (RENAGEL) tablet 800 mg, 800 mg, Oral, TID With Meals, Elvis Jenkins DO, 800 mg at 01/02/20 1742    Laboratory Results:  Results from last 7 days   Lab Units 01/04/20  0540 01/03/20  0559 01/02/20  0518 01/01/20  0836 12/31/19  1001 12/30/19  0837 12/29/19  0849 12/28/19  0839   WBC Thousand/uL  --  6 41 6 01 5 08 5 69 4 37 9 65 7 36   HEMOGLOBIN g/dL  --  9 4* 9 6* 9 7* 10 2* 10 6* 11 3* 10 2*   HEMATOCRIT %  --  30 3* 31 0* 31 0* 32 6* 33 8* 37 8 34 2*   PLATELETS Thousands/uL  --  136* 118* 129* 127* 133* 147* 137*   POTASSIUM mmol/L 4 2 3 9 4 0 3 9 3 4* 3 7 4 6 4 1   CHLORIDE mmol/L 100 100 100 99* 99* 99* 101 101   CO2 mmol/L 24 22 22 24 23 25 22 24   BUN mg/dL 22 33* 53* 45* 36* 48* 68* 49*   CREATININE mg/dL 3 56* 4 45* 6 38* 5 52* 4 42* 5 06* 7 25* 5 88*   CALCIUM mg/dL 8 2* 7 8* 7 6* 7 7* 7 7* 7 7* 8 3 8 2*   MAGNESIUM mg/dL  --   --   --   --   --   --  2 5  --

## 2020-01-04 NOTE — ASSESSMENT & PLAN NOTE
· Nephrology managing, appreciate input  Normal schedule of HD is TTS, HD holiday on 12/30, resumed on January 2nd    Getting an additional treatment today on Friday January 3rd prior to line holiday starting this weekend (will miss Saturday HD)  · She can get a PermCath replaced on Monday if her cultures remain negative

## 2020-01-04 NOTE — PROGRESS NOTES
Progress Note - Infectious Disease   Mansi Levo 81 y o  female MRN: [id number]  Unit/Bed#: S -01 Encounter: [id number]      Impression/Plan:  1  Staph epidermidis bacteremia  Likely source is PermCath with recent exchange on 12/27  Repeat blood cultures returned positive and so catheter removed on 01/03  2D echo unremarkable  Patient without other prosthetic devices  No other localizing symptoms on exam   Patient fortunately without leukocytosis and hemodynamically stable  Dosing antibiotics with hemodialysis due to access issues  Will continue to dose cefazolin with HD  If cultures from 01/03 remain without growth, patient cleared from ID standpoint for PermCath replacement on Monday  Will follow up pending culture data  Plan for total of 2 weeks of therapy from catheter removal/negative cultures  Continue to trend fever curve/WBC  Plans for surveillance cultures 2 weeks after treatment  Ongoing follow-up by Nephrology  Supportive care as per primary     2  Influenza A   PCR was positive on 12/29/2019   Fortunately she remains stable from a respiratory and hemodynamic standpoint     Tamiflu course completed  Monitor for respiratory symptoms              Antibiotics as above     3  R arm swelling   Likely secondary to infiltrated IV   She completed a right upper extremity venous duplex, negative for acute DVT and superficial thrombophlebitis                 Serial exams     4  End-stage renal disease  On HD   Detailed discussion with patient's daughter yesterday and she is concerned for progressing decline in overall status  Will continue antibiotics as above for now  Plan for catheter replacement as above  Palliative care consult/goals of care discussion recommended    5  Oral pharyngeal dysphagia  Maintain modified diet  6  Thrombocytopenia and anemia  Appears stable on repeat labs  Continue to monitor and transfusional support as per primary      ID consult service will continue to follow  Antibiotics:  Cefazolin D4  Antibiotics D6  Tamiflu D5    Catheter removal 1/3    24 hour events:  No acute events noted overnight on chart review  Patient is status post catheter removal  Patient is currently afebrile  Repeat culture so far without growth, catheter tip culture pending  No new imaging  Patient's other vitals are stable    Subjective:  Patient will intermittently Mon on exam and answers yes to most questions  She overall does not participate in exam or questioning  Reviewed with nursing aide and manipulation of her arms or even taking her blood pressure seems to cause her pain  Objective:  Vitals:  Temp:  [98 2 °F (36 8 °C)-99 6 °F (37 6 °C)] 99 2 °F (37 3 °C)  HR:  [72-91] 85  Resp:  [16-18] 16  BP: (116-154)/(49-81) 147/63  SpO2:  [93 %-99 %] 95 %  Temp (24hrs), Av 1 °F (37 3 °C), Min:98 2 °F (36 8 °C), Max:99 6 °F (37 6 °C)  Current: Temperature: 99 2 °F (37 3 °C)    Physical Exam:   General Appearance:  Patient is awake on exam but she does not interact meaningfully  Throat: Oropharynx moist without lesions  Poor dentition noted  Lungs:   Decreased breath sounds anteriorly; no wheezes, rhonchi or rales; respirations unlabored on room air   Heart:  RRR; no murmur, rub or gallop appreciated   Abdomen:   Soft, non-tender, non-distended, positive bowel sounds  Extremities: No clubbing, cyanosis or edema   Skin: No new rashes or lesions  No New draining wounds noted  Prior catheter site bandaged         Labs, Imaging, & Other studies:   All pertinent labs and imaging studies were personally reviewed  Results from last 7 days   Lab Units 20  0559 20  0518 20  0836   WBC Thousand/uL 6 41 6 01 5 08   HEMOGLOBIN g/dL 9 4* 9 6* 9 7*   PLATELETS Thousands/uL 136* 118* 129*     Results from last 7 days   Lab Units 20  0540  19  0849   POTASSIUM mmol/L 4 2   < > 4 6   CHLORIDE mmol/L 100   < > 101   CO2 mmol/L 24   < > 22   BUN mg/dL 22   < > 68* CREATININE mg/dL 3 56*   < > 7 25*   EGFR ml/min/1 73sq m 13   < > 5   CALCIUM mg/dL 8 2*   < > 8 3   AST U/L  --   --  58*   ALT U/L  --   --  57   ALK PHOS U/L  --   --  103    < > = values in this interval not displayed  Results from last 7 days   Lab Units 01/03/20  0622 01/03/20  0611 12/31/19  1020 12/31/19  0959 12/29/19  2135 12/29/19  0907 12/29/19  0849   BLOOD CULTURE  No Growth at 24 hrs  No Growth at 24 hrs  No Growth at 72 hrs   Staphylococcus epidermidis*  --  Staphylococcus epidermidis* Staphylococcus epidermidis*   GRAM STAIN RESULT   --   --   --  Gram positive cocci in clusters*  --  Gram positive cocci in clusters* Gram positive cocci in clusters*   MRSA CULTURE ONLY   --   --   --   --  No Methicillin Resistant Staphlyococcus aureus (MRSA) isolated  --   --

## 2020-01-04 NOTE — ASSESSMENT & PLAN NOTE
· History of stroke  · Supportive care  · Patient is a very limited historian  · As per daughter she has a will to live  Daughter aware little will change in her functioning

## 2020-01-04 NOTE — RESPIRATORY THERAPY NOTE
RT Protocol Note  Liudmila Kingston 81 y o  female MRN: [id number]  Unit/Bed#: S -01 Encounter: [id number]    Assessment    Principal Problem:    Influenza A  Active Problems:    ESRD on hemodialysis ([facility] )    Essential hypertension    Chronic Aphasia / Cognitive impairment    Continuous opioid dependence (HCC)    non MI troponin elevation    Oropharyngeal dysphagia    Gram-positive bacteremia    Swelling of extremity      Home Pulmonary Medications:  Albuteral PRN       Past Medical History:   Diagnosis Date    Altered mental status, unspecified     Anemia     Atherosclerotic heart disease of native coronary artery without angina pectoris     Cancer ([facility])     Chest pain     Chronic diastolic (congestive) heart failure (HCC)     Dependence on renal dialysis ([facility] )     Diabetes mellitus ([facility] )     End-stage renal disease ([place] [address] )     Gastro-esophageal reflux disease without esophagitis     Hyperlipidemia     Hypertension     Long term current use of anticoagulant     Long term current use of insulin ([facility])     Muscle weakness     Nausea with vomiting     Other abnormalities of gait and mobility     Other specified abnormal findings of blood chemistry     Type 2 diabetes mellitus ([facility])     Ventral hernia without obstruction or gangrene     Vitamin D deficiency      Social History     Socioeconomic History    Marital status:      Spouse name: None    Number of children: None    Years of education: None    Highest education level: None   Occupational History    None   Social Needs    Financial resource strain: None    Food insecurity:     Worry: None     Inability: None    Transportation needs:     Medical: None     Non-medical: None   Tobacco Use    Smoking status: Never Smoker    Smokeless tobacco: Never Used   Substance and Sexual Activity    Alcohol use: No    Drug use: No    Sexual activity: None   Lifestyle    Physical activity:     Days per week: None     Minutes per session: None    Stress: None   Relationships    Social connections:     Talks on phone: None     Gets together: None     Attends Mandaen service: None     Active member of club or organization: None     Attends meetings of clubs or organizations: None     Relationship status: None    Intimate partner violence:     Fear of current or ex partner: None     Emotionally abused: None     Physically abused: None     Forced sexual activity: None   Other Topics Concern    None   Social History Narrative    None       Subjective         Objective    Physical Exam:   Assessment Type: Pre-treatment  General Appearance: Alert, Awake  Respiratory Pattern: Normal  Chest Assessment: Chest expansion symmetrical  Bilateral Breath Sounds: Diminished, Coarse  Cough: None    Vitals:  Blood pressure (!) 122/49, pulse 72, temperature 99 2 °F (37 3 °C), temperature source Oral, resp  rate 16, height 5' 3" (1 6 m), weight 83 6 kg (184 lb 4 9 oz), SpO2 94 %  Imaging and other studies: I have personally reviewed pertinent reports  Plan    Respiratory Plan: No distress/Pulmonary history        Resp Comments: Pt reassessed for Respiratory Protocol  BS dimished, slightly coarse  SPO2 94% on room air  Will change nebs to TID XOPenex/Atrovent

## 2020-01-04 NOTE — ASSESSMENT & PLAN NOTE
· I had a long discussion with the patient's daughter concerning her mother's condition and the "next steps"  I discussed with her that if we do not treat this infection or give her hemodialysis that this would undoubtedly lead to her demise  The patient's daughter states that her mother has a will to live despite her (the daughter) not understanding why she would want to continue like this, but she knows that this is what her mother would want  Thus, will continue with IV antibiotics and attempt to get patient back on hemodialysis  Patient's daughter reiterates that she is a level 3 and would not want artificial nutrition should it make it there  I made her aware that the patient is not eating well at this time  · Continue to evaluate patient input  · Revisit goals of care if patient does not show improvement of appetite

## 2020-01-04 NOTE — ASSESSMENT & PLAN NOTE
· POA, both blood cultures on admission positive for staph epidermidis   Repeat cultures negative at 24 hours so far  · ID consult appreciated  · Continue with IV Ancef  · Follow up with repeat blood cultures   · Replace PermCath Monday if culture remain negative  · Will need 4 weeks of IV antibiotics   · Daughter wants to continue with treatment for now

## 2020-01-04 NOTE — ASSESSMENT & PLAN NOTE
· VBS during previous hospitalization recommended modified diet   Patient noted to have facial grimace during evaluation but unfortunately she is so unreliable with her underlying cognitive impairment so I would suggest that we use phenol as needed but not pursue invasive ENT evaluation at this point  · SLP evaluated and recommend Dysphagia level 2 with nectar thickened liquids  · Aspiration precautions  · Continue to monitor

## 2020-01-05 LAB
BACTERIA BLD CULT: ABNORMAL
BACTERIA BLD CULT: NORMAL
BACTERIA CATH TIP CULT: NORMAL
GRAM STN SPEC: ABNORMAL

## 2020-01-05 PROCEDURE — 99232 SBSQ HOSP IP/OBS MODERATE 35: CPT | Performed by: INTERNAL MEDICINE

## 2020-01-05 PROCEDURE — 94760 N-INVAS EAR/PLS OXIMETRY 1: CPT

## 2020-01-05 PROCEDURE — 94640 AIRWAY INHALATION TREATMENT: CPT

## 2020-01-05 PROCEDURE — 99232 SBSQ HOSP IP/OBS MODERATE 35: CPT | Performed by: PHYSICIAN ASSISTANT

## 2020-01-05 RX ORDER — SENNOSIDES 8.6 MG
1 TABLET ORAL
Status: DISCONTINUED | OUTPATIENT
Start: 2020-01-05 | End: 2020-01-08 | Stop reason: HOSPADM

## 2020-01-05 RX ADMIN — KETOROLAC TROMETHAMINE 1 DROP: 5 SOLUTION OPHTHALMIC at 12:21

## 2020-01-05 RX ADMIN — IPRATROPIUM BROMIDE 0.5 MG: 0.5 SOLUTION RESPIRATORY (INHALATION) at 08:21

## 2020-01-05 RX ADMIN — HEPARIN SODIUM 5000 UNITS: 5000 INJECTION INTRAVENOUS; SUBCUTANEOUS at 05:09

## 2020-01-05 RX ADMIN — KETOROLAC TROMETHAMINE 1 DROP: 5 SOLUTION OPHTHALMIC at 21:43

## 2020-01-05 RX ADMIN — BUSPIRONE HYDROCHLORIDE 5 MG: 5 TABLET ORAL at 09:58

## 2020-01-05 RX ADMIN — METOPROLOL TARTRATE 50 MG: 50 TABLET, FILM COATED ORAL at 09:58

## 2020-01-05 RX ADMIN — KETOROLAC TROMETHAMINE 1 DROP: 5 SOLUTION OPHTHALMIC at 00:04

## 2020-01-05 RX ADMIN — ASPIRIN 81 MG 81 MG: 81 TABLET ORAL at 09:57

## 2020-01-05 RX ADMIN — HEPARIN SODIUM 5000 UNITS: 5000 INJECTION INTRAVENOUS; SUBCUTANEOUS at 14:24

## 2020-01-05 RX ADMIN — AMLODIPINE BESYLATE 10 MG: 10 TABLET ORAL at 09:57

## 2020-01-05 RX ADMIN — LEVALBUTEROL 1.25 MG: 1.25 SOLUTION, CONCENTRATE RESPIRATORY (INHALATION) at 08:21

## 2020-01-05 RX ADMIN — POLYETHYLENE GLYCOL 3350 17 G: 17 POWDER, FOR SOLUTION ORAL at 09:57

## 2020-01-05 RX ADMIN — ACETAMINOPHEN 650 MG: 325 TABLET, FILM COATED ORAL at 09:57

## 2020-01-05 RX ADMIN — SERTRALINE HYDROCHLORIDE 50 MG: 50 TABLET ORAL at 09:58

## 2020-01-05 NOTE — PLAN OF CARE
Problem: Potential for Falls  Goal: Patient will remain free of falls  Description  INTERVENTIONS:  - Assess patient frequently for physical needs  -  Identify cognitive and physical deficits and behaviors that affect risk of falls    -  [place] fall precautions as indicated by assessment   - Educate patient/family on patient safety including physical limitations  - Instruct patient to call for assistance with activity based on assessment  - Modify environment to reduce risk of injury  - Consider OT/PT consult to assist with strengthening/mobility  Outcome: Progressing     Problem: Prexisting or High Potential for Compromised Skin Integrity  Goal: Skin integrity is maintained or improved  Description  INTERVENTIONS:  - Identify patients at risk for skin breakdown  - Assess and monitor skin integrity  - Assess and monitor nutrition and hydration status  - Monitor labs   - Assess for incontinence   - Turn and reposition patient  - Assist with mobility/ambulation  - Relieve pressure over bony prominences  - Avoid friction and shearing  - Provide appropriate hygiene as needed including keeping skin clean and dry  - Evaluate need for skin moisturizer/barrier cream  - Collaborate with interdisciplinary team   - Patient/family teaching  - Consider wound care consult   Outcome: Progressing     Problem: METABOLIC, FLUID AND ELECTROLYTES - ADULT  Goal: Electrolytes maintained within normal limits  Description  INTERVENTIONS:  - Monitor labs and assess patient for signs and symptoms of electrolyte imbalances  - Administer electrolyte replacement as ordered  - Monitor response to electrolyte replacements, including repeat lab results as appropriate  - Instruct patient on fluid and nutrition as appropriate  Outcome: Progressing  Goal: Fluid balance maintained  Description  INTERVENTIONS:  - Monitor labs   - Monitor I/O and WT  - Instruct patient on fluid and nutrition as appropriate  - Assess for signs & symptoms of volume excess or deficit  Outcome: Progressing     Problem: Nutrition/Hydration-ADULT  Goal: Nutrient/Hydration intake appropriate for improving, restoring or maintaining nutritional needs  Description  Monitor and assess patient's nutrition/hydration status for malnutrition  Collaborate with interdisciplinary team and initiate plan and interventions as ordered  Monitor patient's weight and dietary intake as ordered or per policy  Utilize nutrition screening tool and intervene as necessary  Determine patient's food preferences and provide high-protein, high-caloric foods as appropriate       INTERVENTIONS:  - Monitor oral intake, urinary output, labs, and treatment plans  - Assess nutrition and hydration status and recommend course of action  - Evaluate amount of meals eaten  - Assist patient with eating if necessary   - Allow adequate time for meals  - Recommend/ encourage appropriate diets, oral nutritional supplements, and vitamin/mineral supplements  - Order, calculate, and assess calorie counts as needed  - Recommend, monitor, and adjust tube feedings and TPN/PPN based on assessed needs  - Assess need for intravenous fluids  - Provide specific nutrition/hydration education as appropriate  - Include patient/family/caregiver in decisions related to nutrition  Outcome: Progressing

## 2020-01-05 NOTE — ASSESSMENT & PLAN NOTE
· POA, both blood cultures on admission positive for staph epidermidis   Repeat cultures negative at 48 hours so far  · ID consult appreciated  · Continue with IV Ancef  · Follow up with repeat blood cultures   · Replace PermCath Monday if culture remain negative  · Will need 4 weeks of IV antibiotics   · Daughter wants to continue with treatment for now

## 2020-01-05 NOTE — PROGRESS NOTES
[facility]  Progress Note - Irma Manzo 1935, 81 y o  female MRN: [id number]    Unit/Bed#: S -01 Encounter: [id number]    Primary Care Provider: Edenilson Salas MD   Date and time admitted to hospital: 12/29/2019  7:21 AM        * Gram-positive bacteremia  Assessment & Plan  · POA, both blood cultures on admission positive for staph epidermidis  Repeat cultures negative at 48 hours so far  · ID consult appreciated  · Continue with IV Ancef  · Follow up with repeat blood cultures   · Replace PermCath Monday if culture remain negative  · Will need 4 weeks of IV antibiotics   · Daughter wants to continue with treatment for now     Influenza A  Assessment & Plan  · POA, arrived with shortness of breath from dialysis center  Flu A positive  Status post 5 days Tamiflu  · Continue with supportive care   · Has been afebrile       ESRD on hemodialysis [facility])  Assessment & Plan  · Nephrology managing, appreciate input  Normal schedule of HD is TTS, HD holiday on 12/30, resumed on January 2nd  Getting an additional treatment today on Friday January 3rd prior to line holiday starting this weekend (will miss Saturday HD)  · She can get a PermCath replaced on Monday if her cultures remain negative at 72 hours   · No labs from this morning, will attempt to obtain tomorrow     Essential hypertension  Assessment & Plan  · BP acceptable   · Continue medications     Continuous opioid dependence (HCC)  Assessment & Plan  · Continue home medications (fentanyl)    Chronic Aphasia / Cognitive impairment  Assessment & Plan  · History of stroke  · Supportive care  · Patient is a very limited historian  · As per daughter she has a will to live  Daughter aware little will change in her functioning       non MI troponin elevation  Assessment & Plan  · Anticoagulation - heparin drip initially, now stopped  · Anti-platelet -  Aspirin  · Beta-blocker - continue labetalol  · Statin - continue atorvastatin  · Nitrate - p r n  Nitroglycerin  · Evaluation -   · Cardiology consultation appreciated  · Echocardiogram: Ejection fraction was estimated in the range of 55 % to 60 %  There was mild hypokinesis of the entire inferior wall(s)  There was possible hypokinesis of the basal-mid inferolateral wall(s)  grade 1 diastolic dysfunction  · Trops elevated with max 2 19, EKG without ischemic change  · Currently patient denies any chest pain symptoms, but poor historian    Goals of care, counseling/discussion  Assessment & Plan  · I had a long discussion with the patient's daughter concerning her mother's condition and the "next steps"  I discussed with her that if we do not treat this infection or give her hemodialysis that this would undoubtedly lead to her demise  The patient's daughter states that her mother has a will to live despite her (the daughter) not understanding why she would want to continue like this, but she knows that this is what her mother would want  Thus, will continue with IV antibiotics and attempt to get patient back on hemodialysis  Patient's daughter reiterates that she is a level 3 and would not want artificial nutrition should it make it there  I made her aware that the patient is not eating well at this time, however the patient ate better today and appears improved  · Continue to evaluate patient input  · Revisit goals of care if patient does not show improvement of appetite  Oropharyngeal dysphagia  Assessment & Plan  · VBS during previous hospitalization recommended modified diet   Patient noted to have facial grimace during evaluation but unfortunately she is so unreliable with her underlying cognitive impairment so I would suggest that we use phenol as needed but not pursue invasive ENT evaluation at this point  · SLP evaluated and recommend Dysphagia level 2 with nectar thickened liquids  · Aspiration precautions  · Continue to monitor      VTE Pharmacologic Prophylaxis: Pharmacologic: Heparin  Mechanical VTE Prophylaxis in Place: Yes    Patient Centered Rounds: I have performed bedside rounds with nursing staff today  Discussions with Specialists or Other Care Team Provider: Discussion with RN, CM    Education and Discussions with Family / Patient: Discussed with daughter over phone     Time Spent for Care: 30 minutes  More than 50% of total time spent on counseling and coordination of care as described above  Current Length of Stay: 7 day(s)    Current Patient Status: Inpatient   Certification Statement: The patient will continue to require additional inpatient hospital stay due to on going IV antibiotics and replacement of perm cath     Discharge Plan: Pending     Code Status: Level 3 - DNAR and DNI      Subjective:   Patient reports no significant complaints, however remains aphasic at baseline  Objective:     Vitals:   Temp (24hrs), Av 8 °F (37 1 °C), Min:98 5 °F (36 9 °C), Max:99 °F (37 2 °C)    Temp:  [98 5 °F (36 9 °C)-99 °F (37 2 °C)] 98 5 °F (36 9 °C)  HR:  [80-93] 80  Resp:  [15-16] 16  BP: (128-129)/(58-59) 129/58  SpO2:  [95 %-97 %] 95 %  Body mass index is 32 26 kg/m²  Input and Output Summary (last 24 hours): Intake/Output Summary (Last 24 hours) at 2020 1209  Last data filed at 2020 1100  Gross per 24 hour   Intake 90 ml   Output    Net 90 ml       Physical Exam:     Physical Exam   Constitutional: Vital signs are normal  She appears well-developed and well-nourished  Non-toxic appearance  She appears ill  No distress  HENT:   Head: Normocephalic and atraumatic  Mouth/Throat: Mucous membranes are not dry  Eyes: Pupils are equal, round, and reactive to light  Conjunctivae and EOM are normal  No scleral icterus  Pupils are equal    Neck: Neck supple  Cardiovascular: Normal rate, regular rhythm, S1 normal, S2 normal, normal heart sounds and intact distal pulses  Exam reveals no S3 and no S4     No murmur heard   Pulmonary/Chest: Effort normal and breath sounds normal  No accessory muscle usage or stridor  No respiratory distress  She has no decreased breath sounds  She has no wheezes  She has no rhonchi  She has no rales  She exhibits no tenderness  Abdominal: Soft  Bowel sounds are normal  She exhibits no distension and no mass  There is no tenderness  There is no rebound and no guarding  Neurological: She is alert  Skin: Skin is warm and dry  Additional Data:     Labs:    Results from last 7 days   Lab Units 01/03/20  0559 01/02/20  0518   WBC Thousand/uL 6 41 6 01   HEMOGLOBIN g/dL 9 4* 9 6*   HEMATOCRIT % 30 3* 31 0*   PLATELETS Thousands/uL 136* 118*   NEUTROS PCT %  --  59   LYMPHS PCT %  --  26   MONOS PCT %  --  10   EOS PCT %  --  5     Results from last 7 days   Lab Units 01/04/20  0540   POTASSIUM mmol/L 4 2   CHLORIDE mmol/L 100   CO2 mmol/L 24   BUN mg/dL 22   CREATININE mg/dL 3 56*   CALCIUM mg/dL 8 2*           * I Have Reviewed All Lab Data Listed Above  * Additional Pertinent Lab Tests Reviewed: Gloria 66 Admission Reviewed    Imaging:    Imaging Reports Reviewed Today Include: None  Imaging Personally Reviewed by Myself Includes:  None    Recent Cultures (last 7 days):     Results from last 7 days   Lab Units 01/03/20  0622 01/03/20  0611 12/31/19  1020 12/31/19  0959   BLOOD CULTURE  No Growth at 48 hrs  No Growth at 48 hrs  No Growth After 4 Days   Staphylococcus epidermidis*   GRAM STAIN RESULT   --   --   --  Gram positive cocci in clusters*       Last 24 Hours Medication List:     Current Facility-Administered Medications:  acetaminophen 650 mg Oral Q6H PRN Twylla , DO   albuterol 2 5 mg Nebulization Q6H PRN Ksaey Taylor MD   amLODIPine 10 mg Oral Daily Luis Crocker MD   aspirin 81 mg Oral Daily Twylla , DO   atorvastatin 40 mg Oral QPM Twylla , DO   benzonatate 100 mg Oral TID PRN Christin Johnson PA-C   bisacodyl 5 mg Oral Daily PRN Mitch Matta, DO   busPIRone 5 mg Oral TID Mitch Matta DO   [START ON 1/6/2020] cefazolin 2,000 mg Intravenous Once Jemal Dumas MD   docusate sodium 100 mg Oral BID Mitch Matta, DO   doxercalciferol 1 mcg Intravenous After Dialysis Mitch Matta, DO   fentaNYL 1 patch Transdermal Q72H Mitch Matta, DO   heparin (porcine) 5,000 Units Subcutaneous Q8H [address] Christin Johnson PA-C   ketorolac 1 drop Both Eyes 4x Daily Mitch Matta, DO   metoprolol tartrate 50 mg Oral Q12H [address] Howie Mg MD   nitroglycerin 0 4 mg Sublingual Q5 Min PRN Mitch Matta, DO   ondansetron 4 mg Intravenous Q6H PRN TIANA Guzmán   phenol 1 spray Mouth/Throat Q2H PRN Christin Johnson PA-C   polyethylene glycol 17 g Oral Daily Mitch Matta, DO   sertraline 50 mg Oral Daily Mitch Matta, DO   sevelamer 800 mg Oral TID With Meals Mitch Matta DO        Today, Patient Was Seen By: Doug Walters PA-C    ** Please Note: Dictation voice to text software may have been used in the creation of this document   **

## 2020-01-05 NOTE — ASSESSMENT & PLAN NOTE
· Nephrology managing, appreciate input  Normal schedule of HD is TTS, HD holiday on 12/30, resumed on January 2nd    Getting an additional treatment today on Friday January 3rd prior to line holiday starting this weekend (will miss Saturday HD)  · She can get a PermCath replaced on Monday if her cultures remain negative at 72 hours   · No labs from this morning, will attempt to obtain tomorrow

## 2020-01-05 NOTE — PROGRESS NOTES
Progress Note - Infectious Disease   Daja Hall 81 y o  female MRN: [id number]  Unit/Bed#: S -01 Encounter: [id number]      Impression/Plan:  1  Staph epidermidis bacteremia  Likely source is PermCath with recent exchange on 12/27  Repeat blood cultures returned positive and so catheter removed on 01/03  2D echo unremarkable  Patient without other prosthetic devices  No other localizing symptoms on exam   Patient fortunately without leukocytosis and hemodynamically stable  Dosing antibiotics with hemodialysis due to access issues  Repeat cultures without growth  Will continue to dose cefazolin with HD  Patient cleared from ID standpoint for PermCath tomorrow  Will follow up pending blood cultures for completeness  Plan for total of 2 weeks of antibiotic with HD through 1/16  Continue to trend fever curve/WBC  Plans for surveillance cultures 2 weeks after treatment, 1/30  Ongoing follow-up by Nephrology  Supportive care as per primary     2  Influenza A   PCR was positive on 12/29/2019   Fortunately she remains stable from a respiratory and hemodynamic standpoint     Tamiflu course completed  Monitor for respiratory symptoms              Antibiotics as above     3  R arm swelling   Likely secondary to infiltrated IV   She completed a right upper extremity venous duplex, negative for acute DVT and superficial thrombophlebitis                 Serial exams     4  End-stage renal disease  On HD   Detailed discussion with patient's daughter and she is concerned for progressing decline in overall status  Will continue antibiotics as above for now  Plan for catheter replacement as above  Recommend palliative care consult with goals of care discussion     5  Oral pharyngeal dysphagia  Maintain modified diet      6  Thrombocytopenia and anemia  Appears stable on repeat labs  Continue to monitor and transfusional support as per primary      Above plan discussed in detail with the patient at bedside      ID consult service will continue to follow  Antibiotics:  Cefazolin D5  Antibiotics D7  Catheter removal 1/3    24 hour events:  No acute events noted overnight on chart review  Patient is currently afebrile  Repeat cultures so far without growth, catheter tip so far without growth  No new imaging overnight  Patient's other vitals are stable    Subjective:  Patient currently denies having any nausea, vomiting, chest pain or shortness of breath  She continues to have pain on her bottom  Objective:  Vitals:  Temp:  [98 5 °F (36 9 °C)-99 6 °F (37 6 °C)] 98 5 °F (36 9 °C)  HR:  [80-93] 80  Resp:  [15-16] 16  BP: (128-129)/(58-59) 129/58  SpO2:  [95 %-97 %] 95 %  Temp (24hrs), Av °F (37 2 °C), Min:98 5 °F (36 9 °C), Max:99 6 °F (37 6 °C)  Current: Temperature: 98 5 °F (36 9 °C)    Physical Exam:   General Appearance:  Patient is awake, she requires continuous encouragement interact  She does not appear in any acute distress   Throat: Oropharynx moist without lesions  Poor dentition noted   Lungs:   Rhonchi heard throughout; respirations stable on room air   Heart:  RRR; no murmur, rub or gallop appreciated   Abdomen:   Soft, non-tender, non-distended, positive bowel sounds  Extremities: No clubbing, cyanosis or edema   Skin: No new rashes or lesions  No new draining wounds noted         Labs, Imaging, & Other studies:   All pertinent labs and imaging studies were personally reviewed  Results from last 7 days   Lab Units 20  0559 20  0518 20  0836   WBC Thousand/uL 6 41 6 01 5 08   HEMOGLOBIN g/dL 9 4* 9 6* 9 7*   PLATELETS Thousands/uL 136* 118* 129*     Results from last 7 days   Lab Units 20  0540   POTASSIUM mmol/L 4 2   CHLORIDE mmol/L 100   CO2 mmol/L 24   BUN mg/dL 22   CREATININE mg/dL 3 56*   EGFR ml/min/1 73sq m 13   CALCIUM mg/dL 8 2*     Results from last 7 days   Lab Units 20  0622 20  0611 19  1020 19  0959 19  2135   BLOOD CULTURE  No Growth at 24 hrs  No Growth at 24 hrs  No Growth After 4 Days   Staphylococcus epidermidis*  --    GRAM STAIN RESULT   --   --   --  Gram positive cocci in clusters*  --    MRSA CULTURE ONLY   --   --   --   --  No Methicillin Resistant Staphlyococcus aureus (MRSA) isolated

## 2020-01-05 NOTE — PROGRESS NOTES
[address] [facility] NOTE   Nu Adrian 81 y o  female MRN: [id number]  Unit/Bed#: S -01 Encounter: [id number]  Reason for Consult: ESRD    ASSESSMENT and PLAN:    60-year-old female with a past medical history of ESRD, hypertension who initially presented with shortness of breath on 12/29  Yulisa Joy had a recent admission on 12/27 for catheter malfunction which was replaced on 12/27   Patient was sent in with shortness of breath and was found to have influenza positive swab   Nephrology is on board for ESRD      1) ESRD -      - on HD at [facility] [address] is left IJ tunneled dialysis catheter  - HD prescription - Na 138, bicarb 30, F160, time 3 5 hours, EDW 87,   - HD on 12/30 due to holiday  -  patient had dialysis Thursday   -had dialysis 1/3-Friday-patient seen examined on dialysis and tolerating treatment  To prepare for line removal  -dialysis catheter removed on 01/03  -line holiday over the weekend  -possibly replace catheter early next week  - f/u final blood cultures - staph epidermidis  Will need 2 week antibiotic course  F/u repeat cultures on 12/31 which are also positive 1/2 bottles    Repeat cultures from 01/03 neg thus far  - if cultures form 1/3 neg tomorrow, can place [facility] per ID note  - I have made pt NPO tonight in case this can occur tomorrow  - f/u cultures in AM and if remains neg and cleared from ID team standpoint, then can ask IR to place [facility]  - next dialysis Monday or tuesday  - RUE edema - unclear etiology  Duplex unrevealing   -next lab work can be on Monday     Reviewed with patient's daughter at the bedside   Patient's daughter is concerned that the patient's overall health is declining and was questioning the current treatment options   Reviewed the options with the patient over the phone yesterday, but the patient daughter stated that she does not remember everything we discussed that she is also ill   We re-reviewed and also Infectious Disease attending was present during the latter part of the conversation  Tosha Haq daughter is in agreement for the catheter removal and potential replacement next week   But the daughter is very clear that she wants to have a family discussion with all of the team members to discuss the next steps   I have reviewed this with the primary team attending  --> I reviewed with the primary team attending about this end of last week and they will evaluate and discuss further with the family     2) electrolytes - stable     3) trop elevation     - per Primary Team and Cardiology team  - was on hep gtt  - f/u ECHO - EF 55-60, mild hypokinesis of entire inferior wall, possible hypokinesis of basal-mid inferolateral walls, grade I dCHF     4) anemia - Hb stable     5) HTN     - monitor for hypotension     6) MBD -  Cont phos binder and hecterol     7) influenza - tamiflu dose adjusted for renal dosing     8) bacteremia     - cultures from admission positive for GPC  - ID on board  -see above    SUBJECTIVE / INTERVAL HISTORY:    Pt with pain  Aphasic   Does not appear SOB    OBJECTIVE:  Current Weight: Weight - Scale: 82 6 kg (182 lb 1 6 oz)  Vitals:    01/04/20 2008 01/04/20 2213 01/05/20 0700 01/05/20 0821   BP:  129/59 129/58    BP Location:  Left arm Left arm    Pulse:  93 80    Resp:  16 16    Temp:  98 8 °F (37 1 °C) 98 5 °F (36 9 °C)    TempSrc:  Oral Oral    SpO2: 95% 97% 96% 95%   Weight:       Height:           Intake/Output Summary (Last 24 hours) at 1/5/2020 1010  Last data filed at 1/5/2020 0700  Gross per 24 hour   Intake 30 ml   Output    Net 30 ml     General: NAD  Skin: no rash  Eyes: anicteric sclera  ENT: dry mucous membrane  Neck: supple  Chest: CTA b/l, no ronchii, no wheeze, no rubs, no rales  CVS: s1s2, no murmur, no gallop, no rub  Abdomen: soft, nontender, nl sounds  Extremities: trace edema LE b/l  : no ambrosio  Neuro: AAOX2-3  Psych: normal affect    Medications:    Current Facility-Administered Medications:    acetaminophen (TYLENOL) tablet 650 mg, 650 mg, Oral, Q6H PRN, Elvis Jenkins DO, 650 mg at 01/05/20 0957    albuterol inhalation solution 2 5 mg, 2 5 mg, Nebulization, Q6H PRN, Robby Bhat MD    amLODIPine (NORVASC) tablet 10 mg, 10 mg, Oral, Daily, Marry Whaley MD, 10 mg at 01/05/20 3513    aspirin chewable tablet 81 mg, 81 mg, Oral, Daily, Elvis Jenkins DO, 81 mg at 01/05/20 0957    atorvastatin (LIPITOR) tablet 40 mg, 40 mg, Oral, QPM, Elvis Jenkins DO, 40 mg at 01/04/20 1736    benzonatate (TESSALON PERLES) capsule 100 mg, 100 mg, Oral, TID PRN, Christin Johnson PA-C, 100 mg at 01/03/20 0020    bisacodyl (DULCOLAX) EC tablet 5 mg, 5 mg, Oral, Daily PRN, Elvis Jenkins DO, 5 mg at 12/30/19 1153    busPIRone (BUSPAR) tablet 5 mg, 5 mg, Oral, TID, Elvis Jenkins DO, 5 mg at 01/05/20 0958    [START ON 1/6/2020] ceFAZolin (ANCEF) IVPB (premix) 2,000 mg, 2,000 mg, Intravenous, Once, Star Vega MD    docusate sodium (COLACE) capsule 100 mg, 100 mg, Oral, BID, Elvis Jenkins DO, 100 mg at 01/03/20 1749    doxercalciferol (HECTOROL) injection 1 mcg, 1 mcg, Intravenous, After Dialysis, Elvis Jenkins DO, 1 mcg at 01/03/20 1000    fentaNYL (DURAGESIC) 25 mcg/hr TD 72 hr patch 1 patch, 1 patch, Transdermal, Q72H, Elvis Jenkins DO, 1 patch at 01/04/20 1950    heparin (porcine) subcutaneous injection 5,000 Units, 5,000 Units, Subcutaneous, Q8H [address], Christin Johnson PA-C, 5,000 Units at 01/05/20 0509    ipratropium (ATROVENT) 0 02 % inhalation solution 0 5 mg, 0 5 mg, Nebulization, TID, Joey Fleming MD, 0 5 mg at 01/05/20 0821    ketorolac (ACULAR) 0 5 % ophthalmic solution 1 drop, 1 drop, Both Eyes, 4x Daily, Elvis Jenkins DO, 1 drop at 01/05/20 0004    levalbuterol (XOPENEX) inhalation solution 1 25 mg, 1 25 mg, Nebulization, TID, Joey Fleming MD, 1 25 mg at 01/05/20 0821    metoprolol tartrate (LOPRESSOR) tablet 50 mg, 50 mg, Oral, Q12H [address], Thanh Burrows MD, 50 mg at 01/05/20 0958    nitroglycerin (NITROSTAT) SL tablet 0 4 mg, 0 4 mg, Sublingual, Q5 Min PRN, An Caline, DO    ondansetron [facility]) injection 4 mg, 4 mg, Intravenous, Q6H PRN, TIANA Guzmán, 4 mg at 12/31/19 1318    phenol (CHLORASEPTIC) 1 4 % mucosal liquid 1 spray, 1 spray, Mouth/Throat, Q2H PRN, Christin Johnson PA-C    polyethylene glycol (MIRALAX) packet 17 g, 17 g, Oral, Daily, An Bonine, DO, 17 g at 01/05/20 0957    sertraline (ZOLOFT) tablet 50 mg, 50 mg, Oral, Daily, An Bonine, DO, 50 mg at 01/05/20 5844    sevelamer (RENAGEL) tablet 800 mg, 800 mg, Oral, TID With Meals, An Caline, DO, 800 mg at 01/02/20 1742    Laboratory Results:  Results from last 7 days   Lab Units 01/04/20  0540 01/03/20  0559 01/02/20  0518 01/01/20  0836 12/31/19  1001 12/30/19  0837   WBC Thousand/uL  --  6 41 6 01 5 08 5 69 4 37   HEMOGLOBIN g/dL  --  9 4* 9 6* 9 7* 10 2* 10 6*   HEMATOCRIT %  --  30 3* 31 0* 31 0* 32 6* 33 8*   PLATELETS Thousands/uL  --  136* 118* 129* 127* 133*   POTASSIUM mmol/L 4 2 3 9 4 0 3 9 3 4* 3 7   CHLORIDE mmol/L 100 100 100 99* 99* 99*   CO2 mmol/L 24 22 22 24 23 25   BUN mg/dL 22 33* 53* 45* 36* 48*   CREATININE mg/dL 3 56* 4 45* 6 38* 5 52* 4 42* 5 06*   CALCIUM mg/dL 8 2* 7 8* 7 6* 7 7* 7 7* 7 7*

## 2020-01-05 NOTE — ASSESSMENT & PLAN NOTE
· I had a long discussion with the patient's daughter concerning her mother's condition and the "next steps"  I discussed with her that if we do not treat this infection or give her hemodialysis that this would undoubtedly lead to her demise  The patient's daughter states that her mother has a will to live despite her (the daughter) not understanding why she would want to continue like this, but she knows that this is what her mother would want  Thus, will continue with IV antibiotics and attempt to get patient back on hemodialysis  Patient's daughter reiterates that she is a level 3 and would not want artificial nutrition should it make it there  I made her aware that the patient is not eating well at this time, however the patient ate better today and appears improved  · Continue to evaluate patient input  · Revisit goals of care if patient does not show improvement of appetite

## 2020-01-06 ENCOUNTER — APPOINTMENT (INPATIENT)
Dept: ULTRASOUND IMAGING | Facility: HOSPITAL | Age: 85
DRG: 193 | End: 2020-01-06
Payer: MEDICARE

## 2020-01-06 ENCOUNTER — APPOINTMENT (INPATIENT)
Dept: RADIOLOGY | Facility: HOSPITAL | Age: 85
DRG: 193 | End: 2020-01-06
Attending: INTERNAL MEDICINE
Payer: MEDICARE

## 2020-01-06 LAB
ANION GAP SERPL CALCULATED.3IONS-SCNC: 11 MMOL/L (ref 4–13)
BUN SERPL-MCNC: 39 MG/DL (ref 5–25)
CALCIUM SERPL-MCNC: 8.1 MG/DL (ref 8.3–10.1)
CHLORIDE SERPL-SCNC: 100 MMOL/L (ref 100–108)
CO2 SERPL-SCNC: 24 MMOL/L (ref 21–32)
CREAT SERPL-MCNC: 5.79 MG/DL (ref 0.6–1.3)
ERYTHROCYTE [DISTWIDTH] IN BLOOD BY AUTOMATED COUNT: 15.4 % (ref 11.6–15.1)
GFR SERPL CREATININE-BSD FRML MDRD: 7 ML/MIN/1.73SQ M
GLUCOSE SERPL-MCNC: 81 MG/DL (ref 65–140)
HCT VFR BLD AUTO: 31.4 % (ref 34.8–46.1)
HGB BLD-MCNC: 9.6 G/DL (ref 11.5–15.4)
MCH RBC QN AUTO: 27.4 PG (ref 26.8–34.3)
MCHC RBC AUTO-ENTMCNC: 30.6 G/DL (ref 31.4–37.4)
MCV RBC AUTO: 90 FL (ref 82–98)
PLATELET # BLD AUTO: 200 THOUSANDS/UL (ref 149–390)
PMV BLD AUTO: 10 FL (ref 8.9–12.7)
POTASSIUM SERPL-SCNC: 4.8 MMOL/L (ref 3.5–5.3)
RBC # BLD AUTO: 3.51 MILLION/UL (ref 3.81–5.12)
SODIUM SERPL-SCNC: 135 MMOL/L (ref 136–145)
WBC # BLD AUTO: 6.72 THOUSAND/UL (ref 4.31–10.16)

## 2020-01-06 PROCEDURE — C1769 GUIDE WIRE: HCPCS

## 2020-01-06 PROCEDURE — 99232 SBSQ HOSP IP/OBS MODERATE 35: CPT | Performed by: NURSE PRACTITIONER

## 2020-01-06 PROCEDURE — 99232 SBSQ HOSP IP/OBS MODERATE 35: CPT | Performed by: INTERNAL MEDICINE

## 2020-01-06 PROCEDURE — 85027 COMPLETE CBC AUTOMATED: CPT | Performed by: INTERNAL MEDICINE

## 2020-01-06 PROCEDURE — 02H633Z INSERTION OF INFUSION DEVICE INTO RIGHT ATRIUM, PERCUTANEOUS APPROACH: ICD-10-PCS | Performed by: RADIOLOGY

## 2020-01-06 PROCEDURE — 94760 N-INVAS EAR/PLS OXIMETRY 1: CPT

## 2020-01-06 PROCEDURE — 99152 MOD SED SAME PHYS/QHP 5/>YRS: CPT

## 2020-01-06 PROCEDURE — 94664 DEMO&/EVAL PT USE INHALER: CPT

## 2020-01-06 PROCEDURE — 76937 US GUIDE VASCULAR ACCESS: CPT | Performed by: RADIOLOGY

## 2020-01-06 PROCEDURE — C1894 INTRO/SHEATH, NON-LASER: HCPCS

## 2020-01-06 PROCEDURE — 99153 MOD SED SAME PHYS/QHP EA: CPT

## 2020-01-06 PROCEDURE — 94640 AIRWAY INHALATION TREATMENT: CPT

## 2020-01-06 PROCEDURE — 76937 US GUIDE VASCULAR ACCESS: CPT

## 2020-01-06 PROCEDURE — 99152 MOD SED SAME PHYS/QHP 5/>YRS: CPT | Performed by: RADIOLOGY

## 2020-01-06 PROCEDURE — 36557 INSERT TUNNELED CV CATH: CPT

## 2020-01-06 PROCEDURE — 99024 POSTOP FOLLOW-UP VISIT: CPT | Performed by: RADIOLOGY

## 2020-01-06 PROCEDURE — 77001 FLUOROGUIDE FOR VEIN DEVICE: CPT

## 2020-01-06 PROCEDURE — 80048 BASIC METABOLIC PNL TOTAL CA: CPT | Performed by: INTERNAL MEDICINE

## 2020-01-06 PROCEDURE — 77001 FLUOROGUIDE FOR VEIN DEVICE: CPT | Performed by: RADIOLOGY

## 2020-01-06 PROCEDURE — 36558 INSERT TUNNELED CV CATH: CPT | Performed by: RADIOLOGY

## 2020-01-06 PROCEDURE — 76536 US EXAM OF HEAD AND NECK: CPT

## 2020-01-06 PROCEDURE — B5181ZA FLUOROSCOPY OF SUPERIOR VENA CAVA USING LOW OSMOLAR CONTRAST, GUIDANCE: ICD-10-PCS | Performed by: RADIOLOGY

## 2020-01-06 RX ORDER — MIDAZOLAM HYDROCHLORIDE 2 MG/2ML
INJECTION, SOLUTION INTRAMUSCULAR; INTRAVENOUS CODE/TRAUMA/SEDATION MEDICATION
Status: COMPLETED | OUTPATIENT
Start: 2020-01-06 | End: 2020-01-06

## 2020-01-06 RX ORDER — CEFAZOLIN SODIUM 1 G/3ML
INJECTION, POWDER, FOR SOLUTION INTRAMUSCULAR; INTRAVENOUS CODE/TRAUMA/SEDATION MEDICATION
Status: COMPLETED | OUTPATIENT
Start: 2020-01-06 | End: 2020-01-06

## 2020-01-06 RX ORDER — FENTANYL CITRATE 50 UG/ML
INJECTION, SOLUTION INTRAMUSCULAR; INTRAVENOUS CODE/TRAUMA/SEDATION MEDICATION
Status: COMPLETED | OUTPATIENT
Start: 2020-01-06 | End: 2020-01-06

## 2020-01-06 RX ORDER — IPRATROPIUM BROMIDE AND ALBUTEROL SULFATE 2.5; .5 MG/3ML; MG/3ML
3 SOLUTION RESPIRATORY (INHALATION)
Status: DISCONTINUED | OUTPATIENT
Start: 2020-01-06 | End: 2020-01-08 | Stop reason: HOSPADM

## 2020-01-06 RX ORDER — CEFAZOLIN SODIUM 2 G/50ML
2000 SOLUTION INTRAVENOUS
Status: DISCONTINUED | OUTPATIENT
Start: 2020-01-07 | End: 2020-01-08 | Stop reason: HOSPADM

## 2020-01-06 RX ORDER — CEFAZOLIN SODIUM 1 G/50ML
1000 SOLUTION INTRAVENOUS EVERY 24 HOURS
Status: DISPENSED | OUTPATIENT
Start: 2020-01-06 | End: 2020-01-06

## 2020-01-06 RX ADMIN — IPRATROPIUM BROMIDE AND ALBUTEROL SULFATE 3 ML: 2.5; .5 SOLUTION RESPIRATORY (INHALATION) at 14:49

## 2020-01-06 RX ADMIN — MIDAZOLAM HYDROCHLORIDE 1 MG: 1 INJECTION, SOLUTION INTRAMUSCULAR; INTRAVENOUS at 16:43

## 2020-01-06 RX ADMIN — IOHEXOL 8 ML: 350 INJECTION, SOLUTION INTRAVENOUS at 17:06

## 2020-01-06 RX ADMIN — KETOROLAC TROMETHAMINE 1 DROP: 5 SOLUTION OPHTHALMIC at 21:47

## 2020-01-06 RX ADMIN — BUSPIRONE HYDROCHLORIDE 5 MG: 5 TABLET ORAL at 18:00

## 2020-01-06 RX ADMIN — FENTANYL CITRATE 25 MCG: 50 INJECTION, SOLUTION INTRAMUSCULAR; INTRAVENOUS at 16:29

## 2020-01-06 RX ADMIN — ATORVASTATIN CALCIUM 40 MG: 40 TABLET, FILM COATED ORAL at 18:00

## 2020-01-06 RX ADMIN — MIDAZOLAM HYDROCHLORIDE 0.5 MG: 1 INJECTION, SOLUTION INTRAMUSCULAR; INTRAVENOUS at 16:21

## 2020-01-06 RX ADMIN — IPRATROPIUM BROMIDE AND ALBUTEROL SULFATE 3 ML: 2.5; .5 SOLUTION RESPIRATORY (INHALATION) at 19:11

## 2020-01-06 RX ADMIN — CEFAZOLIN 1000 MG: 1 INJECTION, POWDER, FOR SOLUTION INTRAVENOUS at 16:21

## 2020-01-06 RX ADMIN — HEPARIN SODIUM 5000 UNITS: 5000 INJECTION INTRAVENOUS; SUBCUTANEOUS at 21:47

## 2020-01-06 RX ADMIN — FENTANYL CITRATE 50 MCG: 50 INJECTION, SOLUTION INTRAMUSCULAR; INTRAVENOUS at 16:44

## 2020-01-06 RX ADMIN — SEVELAMER HYDROCHLORIDE 800 MG: 800 TABLET, FILM COATED PARENTERAL at 18:00

## 2020-01-06 RX ADMIN — KETOROLAC TROMETHAMINE 1 DROP: 5 SOLUTION OPHTHALMIC at 11:26

## 2020-01-06 RX ADMIN — KETOROLAC TROMETHAMINE 1 DROP: 5 SOLUTION OPHTHALMIC at 18:00

## 2020-01-06 RX ADMIN — KETOROLAC TROMETHAMINE 1 DROP: 5 SOLUTION OPHTHALMIC at 09:02

## 2020-01-06 RX ADMIN — FENTANYL CITRATE 25 MCG: 50 INJECTION, SOLUTION INTRAMUSCULAR; INTRAVENOUS at 16:20

## 2020-01-06 RX ADMIN — Medication 1 SPRAY: at 14:42

## 2020-01-06 NOTE — ASSESSMENT & PLAN NOTE
· Right arm swelling noted on exam- likely IV infiltration  · No clot noted on venous duplex of right arm, radial pulse +2

## 2020-01-06 NOTE — INTERVAL H&P NOTE
Update:     84F with bacteremia presumably related to a tunneled dialysis catheter that has now been removed  She requires dialysis and new catheter placement is indicated  Based on my ultrasound examination the right internal jugular vein is small, she may require external jugular placement  Left-sided placement is not referable at this time due to recent catheter removal     Risks including bleeding, infection, damage to soft tissues and vasculature discussed with the patient's daughter who is the decision maker  DNR/DNI has not been reversed for the procedure    MP2 ASA4    Patient re-evaluated   Accept as history and physical     Hernandez Kyle MD/January 6, 2020/4:10 PM

## 2020-01-06 NOTE — PLAN OF CARE
Problem: Potential for Falls  Goal: Patient will remain free of falls  Description  INTERVENTIONS:  - Assess patient frequently for physical needs  -  Identify cognitive and physical deficits and behaviors that affect risk of falls    -  [place] fall precautions as indicated by assessment   - Educate patient/family on patient safety including physical limitations  - Instruct patient to call for assistance with activity based on assessment  - Modify environment to reduce risk of injury  - Consider OT/PT consult to assist with strengthening/mobility  1/6/2020 0946 by Sam Bermudez RN  Outcome: Progressing  1/6/2020 0946 by Sam Bermudez RN  Outcome: Progressing     Problem: Prexisting or High Potential for Compromised Skin Integrity  Goal: Skin integrity is maintained or improved  Description  INTERVENTIONS:  - Identify patients at risk for skin breakdown  - Assess and monitor skin integrity  - Assess and monitor nutrition and hydration status  - Monitor labs   - Assess for incontinence   - Turn and reposition patient  - Assist with mobility/ambulation  - Relieve pressure over bony prominences  - Avoid friction and shearing  - Provide appropriate hygiene as needed including keeping skin clean and dry  - Evaluate need for skin moisturizer/barrier cream  - Collaborate with interdisciplinary team   - Patient/family teaching  - Consider wound care consult   1/6/2020 0946 by Sam Bermudez RN  Outcome: Progressing  1/6/2020 0946 by Sam Bermudez RN  Outcome: Progressing     Problem: METABOLIC, FLUID AND ELECTROLYTES - ADULT  Goal: Electrolytes maintained within normal limits  Description  INTERVENTIONS:  - Monitor labs and assess patient for signs and symptoms of electrolyte imbalances  - Administer electrolyte replacement as ordered  - Monitor response to electrolyte replacements, including repeat lab results as appropriate  - Instruct patient on fluid and nutrition as appropriate  1/6/2020 0946 by Ev Birmingham Madina Madera RN  Outcome: Progressing  1/6/2020 0946 by Lori Gibbons RN  Outcome: Progressing  Goal: Fluid balance maintained  Description  INTERVENTIONS:  - Monitor labs   - Monitor I/O and WT  - Instruct patient on fluid and nutrition as appropriate  - Assess for signs & symptoms of volume excess or deficit  1/6/2020 0946 by Lori Gibbons RN  Outcome: Progressing  1/6/2020 0946 by Lori Gibbons RN  Outcome: Progressing     Problem: Nutrition/Hydration-ADULT  Goal: Nutrient/Hydration intake appropriate for improving, restoring or maintaining nutritional needs  Description  Monitor and assess patient's nutrition/hydration status for malnutrition  Collaborate with interdisciplinary team and initiate plan and interventions as ordered  Monitor patient's weight and dietary intake as ordered or per policy  Utilize nutrition screening tool and intervene as necessary  Determine patient's food preferences and provide high-protein, high-caloric foods as appropriate       INTERVENTIONS:  - Monitor oral intake, urinary output, labs, and treatment plans  - Assess nutrition and hydration status and recommend course of action  - Evaluate amount of meals eaten  - Assist patient with eating if necessary   - Allow adequate time for meals  - Recommend/ encourage appropriate diets, oral nutritional supplements, and vitamin/mineral supplements  - Order, calculate, and assess calorie counts as needed  - Recommend, monitor, and adjust tube feedings and TPN/PPN based on assessed needs  - Assess need for intravenous fluids  - Provide specific nutrition/hydration education as appropriate  - Include patient/family/caregiver in decisions related to nutrition  1/6/2020 0946 by Lori Gibbons RN  Outcome: Progressing  1/6/2020 0946 by Lori Gibbons RN  Outcome: Progressing

## 2020-01-06 NOTE — PLAN OF CARE
Problem: Potential for Falls  Goal: Patient will remain free of falls  Description  INTERVENTIONS:  - Assess patient frequently for physical needs  -  Identify cognitive and physical deficits and behaviors that affect risk of falls    -  [place] fall precautions as indicated by assessment   - Educate patient/family on patient safety including physical limitations  - Instruct patient to call for assistance with activity based on assessment  - Modify environment to reduce risk of injury  - Consider OT/PT consult to assist with strengthening/mobility  Outcome: Progressing

## 2020-01-06 NOTE — PROGRESS NOTES
NEPHROLOGY PROGRESS NOTE    Lisa Gutierrez 81 y o  female MRN: [id number]  Unit/Bed#: S -01 Encounter: [id number]  Reason for Consult:  End-stage renal disease    The patient was lying in her bed comfortable she was resting when I went into the room and I awakened her  She seemed to be at her baseline according to the notes but this was the 1st I met her she was in no distress  ASSESSMENT/PLAN:  1  Renal    Patient has end-stage renal disease hemodialysis is normally   Electrolytes were reviewed today potassium is 4 8 is stable and patient has no signs of volume overload or distress  Plan for dialysis tomorrow and continue normal treatment   Hemodialysis     2  Infectious Disease    Patient with Staph epidermidis bacteremia and was felt likely due to her PermCath is the site  PermCath was removed and blood cultures have been negative since removal so Infectious Disease gave clearance to undergo new PermCath placement  Her old site on her left anterior chest has a dressing on it  Interventional Radiology consult to have PermCath placed  Patient on cefazolin and per Infectious Disease note which was reviewed will continue treatment through 2020  Likely will give 2 g after each dialysis treatment  3  Influenza  Status post Tamiflu  SUBJECTIVE:  ROS  Patient lying bed unable to give me a complete review of system due to the fact that she was really only answer yes or no questions  She is in no distress  OBJECTIVE:  Current Weight: Weight - Scale: 79 4 kg (175 lb 0 7 oz)  Vitals:Temp (24hrs), Av 3 °F (37 4 °C), Min:99 1 °F (37 3 °C), Max:99 5 °F (37 5 °C)  Current: Temperature: 99 1 °F (37 3 °C)   Blood pressure 165/70, pulse 85, temperature 99 1 °F (37 3 °C), temperature source Axillary, resp  rate 20, height 5' 3" (1 6 m), weight 79 4 kg (175 lb 0 7 oz), SpO2 95 %   , Body mass index is 31 01 kg/m²  Intake/Output Summary (Last 24 hours) at 1/6/2020 1236  Last data filed at 1/6/2020 0728  Gross per 24 hour   Intake 30 ml   Output    Net 30 ml       Physical Exam: /70 (BP Location: Left arm)   Pulse 85   Temp 99 1 °F (37 3 °C) (Axillary)   Resp 20   Ht 5' 3" (1 6 m)   Wt 79 4 kg (175 lb 0 7 oz)   SpO2 95%   BMI 31 01 kg/m²   Physical Exam   Constitutional:  Non-toxic appearance  She does not appear ill  No distress  HENT:   Head: Atraumatic  Mouth/Throat: Oropharynx is clear and moist    Eyes: EOM are normal    Neck: Neck supple  No JVD present  Cardiovascular: Normal rate and regular rhythm  Exam reveals no gallop and no friction rub  Pulmonary/Chest: Effort normal and breath sounds normal  No respiratory distress  She has no wheezes  She has no rhonchi  She has no rales  Abdominal: Soft  Bowel sounds are normal  She exhibits no distension  There is no tenderness  There is no rebound and no guarding         Medications:    Current Facility-Administered Medications:     acetaminophen (TYLENOL) tablet 650 mg, 650 mg, Oral, Q6H PRN, Twylla , DO, 650 mg at 01/05/20 0957    albuterol inhalation solution 2 5 mg, 2 5 mg, Nebulization, Q6H PRN, Kasey Taylor MD    amLODIPine (NORVASC) tablet 10 mg, 10 mg, Oral, Daily, Luis Crocker MD, Stopped at 01/06/20 3769    aspirin chewable tablet 81 mg, 81 mg, Oral, Daily, Twylla , DO, Stopped at 01/06/20 0857    atorvastatin (LIPITOR) tablet 40 mg, 40 mg, Oral, QPM, Twylla , DO, 40 mg at 01/04/20 1736    benzonatate (TESSALON PERLES) capsule 100 mg, 100 mg, Oral, TID PRN, Christin Johnson PA-C, 100 mg at 01/03/20 0020    bisacodyl (DULCOLAX) EC tablet 5 mg, 5 mg, Oral, Daily PRN, Twylla , DO, 5 mg at 12/30/19 1153    busPIRone (BUSPAR) tablet 5 mg, 5 mg, Oral, TID, Twylla , DO, 5 mg at 01/05/20 0958    ceFAZolin (ANCEF) IVPB (premix) 1,000 mg, 1,000 mg, Intravenous, Q24H, Luba MD Alivia    doxercalciferol (HECTOROL) injection 1 mcg, 1 mcg, Intravenous, After Dialysis, Frantz Bartholomew DO, 1 mcg at 01/03/20 1000    fentaNYL (DURAGESIC) 25 mcg/hr TD 72 hr patch 1 patch, 1 patch, Transdermal, Q72H, Frantz Bartholomew DO, 1 patch at 01/04/20 1950    heparin (porcine) subcutaneous injection 5,000 Units, 5,000 Units, Subcutaneous, Q8H [address], Christin Johnson PA-C, 5,000 Units at 01/05/20 1424    ketorolac (ACULAR) 0 5 % ophthalmic solution 1 drop, 1 drop, Both Eyes, 4x Daily, Frantz Bartholomew DO, 1 drop at 01/06/20 1126    metoprolol tartrate (LOPRESSOR) tablet 50 mg, 50 mg, Oral, Q12H [address], Prashant Cummins MD, Stopped at 01/06/20 0857    nitroglycerin (NITROSTAT) SL tablet 0 4 mg, 0 4 mg, Sublingual, Q5 Min PRN, Frantz Bartholomew DO    ondansetron [facility]) injection 4 mg, 4 mg, Intravenous, Q6H PRN, TIANA Guzmán, 4 mg at 12/31/19 1318    phenol (CHLORASEPTIC) 1 4 % mucosal liquid 1 spray, 1 spray, Mouth/Throat, Q2H PRN, Christin Johnson PA-C    polyethylene glycol (MIRALAX) packet 17 g, 17 g, Oral, Daily, Frantz Bartholomew DO, Stopped at 01/06/20 0857    senna (SENOKOT) tablet 8 6 mg, 1 tablet, Oral, HS, Lucio Velasquez PA-C    sertraline (ZOLOFT) tablet 50 mg, 50 mg, Oral, Daily, Frantz Bartholomew DO, Stopped at 01/06/20 0857    sevelamer (RENAGEL) tablet 800 mg, 800 mg, Oral, TID With Meals, Frantz Bartholomew DO, 800 mg at 01/02/20 1742    Laboratory Results:  Lab Results   Component Value Date    WBC 6 72 01/06/2020    HGB 9 6 (L) 01/06/2020    HCT 31 4 (L) 01/06/2020    MCV 90 01/06/2020     01/06/2020     Lab Results   Component Value Date    SODIUM 135 (L) 01/06/2020    K 4 8 01/06/2020     01/06/2020    CO2 24 01/06/2020    BUN 39 (H) 01/06/2020    CREATININE 5 79 (H) 01/06/2020    GLUC 81 01/06/2020    CALCIUM 8 1 (L) 01/06/2020     Lab Results   Component Value Date    CALCIUM 8 1 (L) 01/06/2020    PHOS 2 8 01/08/2019     No results found for: LABPROT

## 2020-01-06 NOTE — PROCEDURES
Central Line Insertion  Date/Time: 1/6/2020 5:26 PM  Performed by: Rafia Lutz MD  Authorized by: Rafia Lutz MD     Patient location:  IR  Other Assisting Provider: No    Consent:     Consent obtained:  Verbal    Consent given by:  Healthcare agent    Risks discussed:  Arterial puncture, bleeding and infection    Alternatives discussed:  No treatment and delayed treatment  Universal protocol:     Procedure explained and questions answered to patient or proxy's satisfaction: yes      Relevant documents present and verified: yes      Test results available and properly labeled: yes      Radiology Images displayed and confirmed  If images not available, report reviewed: yes      Required blood products, implants, devices, and special equipment available: yes      Site/side marked: yes      Immediately prior to procedure, a time out was called: yes      Patient identity confirmed:  Verbally with patient and arm band  Pre-procedure details:     Hand hygiene: Hand hygiene performed prior to insertion      Sterile barrier technique: All elements of maximal sterile technique followed      Skin preparation:  2% chlorhexidine    Skin preparation agent: Skin preparation agent completely dried prior to procedure    Procedure details:     Location:  Right internal jugular    Vessel type: vein      Laterality:  Right    Approach: percutaneous technique used      Patient position:  Flat    Catheter type:  Double lumen    Catheter size:  14 Fr    Landmarks identified: yes      Ultrasound guidance: yes      Sterile ultrasound techniques: Sterile gel and sterile probe covers were used      Number of attempts:  1    Successful placement: yes    Post-procedure details:     Post-procedure:  Dressing applied and line sutured    Assessment:  Placement verified by x-ray    Post-procedure complications: none      Patient tolerance of procedure:   Tolerated well, no immediate complications  Comments:      Right IJ  Ready to USE

## 2020-01-06 NOTE — ASSESSMENT & PLAN NOTE
· Previous provider had a long discussion with the patient's daughter concerning her mother's condition and the "next steps"  I discussed with her that if we do not treat this infection or give her hemodialysis that this would undoubtedly lead to her demise  The patient's daughter states that her mother has a will to live despite her (the daughter) not understanding why she would want to continue like this, but she knows that this is what her mother would want  Thus, will continue with IV antibiotics and attempt to get patient back on hemodialysis  Patient's daughter reiterates that she is a level 3 and would not want artificial nutrition should it make it there  I made her aware that the patient is not eating well at this time, however the patient ate better today and appears improved  · Continue to evaluate patient input  · Revisit goals of care if patient does not show improvement of appetite

## 2020-01-06 NOTE — SPEECH THERAPY NOTE
Speech Language/Pathology    Speech/Language Pathology Cancel Note    Patient Name: Jorge Sheikh  [id number] Date: 1/6/2020     Chart review completed  Pt currently NPO for possible dialysis catheter placement today  ST will cont to follow for dysphagia tx as able/appropriate         MITCHELL Burton-SLP

## 2020-01-06 NOTE — ASSESSMENT & PLAN NOTE
· POA, arrived with shortness of breath from dialysis center  Flu A positive  Status post 5 days Tamiflu   Continues with diffuse inp/exp wheezing   · Start scheduled Duo-nebs and Albuterol PRN  · Continue with supportive care

## 2020-01-06 NOTE — PROGRESS NOTES
Progress Note - Infectious Disease   Jorge Sheikh 81 y o  female MRN: [id number]  Unit/Bed#: S -01 Encounter: [id number]      Impression/Plan:  1  Staph epidermidis bacteremia  Likely source is PermCath with recent exchange on 12/27  Dlaton Lasso blood cultures returned positive, and therefore, catheter removed on 01/03   2D echo unremarkable   Patient without other prosthetic devices  Catheter tip 11 colonies of mixed skin zhanna  Repeat blood cultures without growth  Will continue to dose cefazolin daily while inpatient  Patient cleared from ID standpoint for PermCath with repeat blood cultures negative at 72 hours  Plan for after HD dosing on discharge and total of 2 weeks of antibiotic with HD through 1/16  Plans for surveillance cultures 2 weeks after treatment, 1/30  Ongoing follow-up by Nephrology  Supportive care as per primary     2  Influenza A   PCR was positive on 12/29/2019  Improved respiratory status  Tamiflu course completed  Monitor for respiratory symptoms     3  R arm swelling   Likely secondary to infiltrated IV   She completed a right upper extremity venous duplex, negative for acute DVT and superficial thrombophlebitis                 Serial exams     4  End-stage renal disease  On HD    Will continue antibiotics as above for now  Plan for catheter replacement as above     5  Oral pharyngeal dysphagia   Maintain modified diet      6  Anemia   Appears stable on repeat labs   Continue to monitor and transfusional support as per primary      Above plan discussed in detail with the patient at bedside, [facility]err[facility], NP and Emmanuel Talamantes NP      Antibiotics:  Cefazolin D6  Antibiotics D8  Catheter removal 1/3     Subjective:  Patient currently denies having any nausea, vomiting, chest pain or shortness of breath    She is able to bring up phlegm production on own    Objective:  Vitals:  Temp:  [99 1 °F (37 3 °C)-99 5 °F (37 5 °C)] 99 1 °F (37 3 °C)  HR:  [77-85] 85  Resp:  [15-20] 20  BP: (137-165)/(61-74) 165/70  SpO2:  [95 %-96 %] 95 %  Temp (24hrs), Av 3 °F (37 4 °C), Min:99 1 °F (37 3 °C), Max:99 5 °F (37 5 °C)  Current: Temperature: 99 1 °F (37 3 °C)    General Appearance:  Awake, alert, cooperative, resting in bed, no acute distress  Groans intermittently  Throat: Oropharynx moist without lesions  Lungs:   Clearer anteriorly to auscultation bilaterally with few scattered coarse breath sounds  Positive clearing cough with phlegm production,  respirations unlabored on room air   Heart:  RRR; S1-S2 heard, no murmur   Abdomen:   Soft, non-tender, non-distended, positive bowel sounds  Extremities: arm IV site nontender, + LE venodynes   Skin: No rashes, movable firm nodule beneath left submandibular jaw that is nontender and unchanged from last week's exam      Labs, Imaging, & Other studies:   All pertinent labs and imaging studies were personally reviewed  Results from last 7 days   Lab Units 20  0556 20  0559 20  0518   WBC Thousand/uL 6 72 6 41 6 01   HEMOGLOBIN g/dL 9 6* 9 4* 9 6*   PLATELETS Thousands/uL 200 136* 118*     Results from last 7 days   Lab Units 20  0556   POTASSIUM mmol/L 4 8   CHLORIDE mmol/L 100   CO2 mmol/L 24   BUN mg/dL 39*   CREATININE mg/dL 5 79*   EGFR ml/min/1 73sq m 7   CALCIUM mg/dL 8 1*         Results from last 7 days   Lab Units 20  0622 20  0611 19  1020 19  0959   BLOOD CULTURE  No Growth at 72 hrs  No Growth at 72 hrs  No Growth After 5 Days   Staphylococcus epidermidis*   GRAM STAIN RESULT   --   --   --  Gram positive cocci in clusters*

## 2020-01-06 NOTE — ASSESSMENT & PLAN NOTE
· POA, both blood cultures on admission positive for staph epidermidis   Repeat cultures negative at 72 hours   · ID consult appreciated  · Continue with IV Ancef after HD through 1/16/19  · Repeat blood cultures on 1/301/9   · OK to replace permcath today as cultures are negative at 72 hours

## 2020-01-06 NOTE — NURSING NOTE
Around 2120 I entered Marya's room to give her, her scheduled night time medications when I noticed a golf ball sized mass on the left side of her neck  I didn't recall hearing about this in report, so I ran it past our charge nurse who has had Rosie Tj previously as a patient and she too, did not recall seeing this mass  I notified [facility] via AMION at 2126  The patient is currently stable and I will continue to monitor

## 2020-01-06 NOTE — ASSESSMENT & PLAN NOTE
· Nephrology managing, appreciate input  Normal schedule of HD is TTS, HD holiday on 12/30, resumed on January 2nd    Getting an additional treatment today on Friday January 3rd prior to line holiday starting this weekend (will miss Saturday HD)  · She can her PermCath replaced today as her cultures remain negative at 72 hours   · Nephrology following  · Planning for HD tomorrow   · BMP in am

## 2020-01-06 NOTE — PROGRESS NOTES
Progress Note - Angeles Estrada 1935, 81 y o  female MRN: [id number]    Unit/Bed#: S -01 Encounter: [id number]    Primary Care Provider: Otilio Rossi MD   Date and time admitted to hospital: 12/29/2019  7:21 AM        * Gram-positive bacteremia  Assessment & Plan  · POA, both blood cultures on admission positive for staph epidermidis  Repeat cultures negative at 72 hours   · ID consult appreciated  · Continue with IV Ancef after HD through 1/16/19  · Repeat blood cultures on 1/301/9   · OK to replace permcath today as cultures are negative at 72 hours     Influenza A  Assessment & Plan  · POA, arrived with shortness of breath from dialysis center  Flu A positive  Status post 5 days Tamiflu  Continues with diffuse inp/exp wheezing   · Start scheduled Duo-nebs and Albuterol PRN  · Continue with supportive care     non MI troponin elevation  Assessment & Plan  · Anticoagulation - heparin drip initially, now stopped  · Anti-platelet -  Aspirin  · Beta-blocker - continue labetalol  · Statin - continue atorvastatin  · Nitrate - p r n  Nitroglycerin  · Evaluation -   · Cardiology consultation appreciated  · Echocardiogram: Ejection fraction was estimated in the range of 55 % to 60 %  There was mild hypokinesis of the entire inferior wall(s)  There was possible hypokinesis of the basal-mid inferolateral wall(s)  grade 1 diastolic dysfunction  · Trops elevated with max 2 19, EKG without ischemic change  · Currently patient denies any chest pain symptoms, but poor historian    ESRD on hemodialysis [facility])  Assessment & Plan  · Nephrology managing, appreciate input  Normal schedule of HD is TTS, HD holiday on 12/30, resumed on January 2nd    Getting an additional treatment today on Friday January 3rd prior to line holiday starting this weekend (will miss Saturday HD)  · She can her PermCath replaced today as her cultures remain negative at 72 hours   · Nephrology following  · Planning for HD tomorrow · BMP in am     Goals of care, counseling/discussion  Assessment & Plan  · Previous provider had a long discussion with the patient's daughter concerning her mother's condition and the "next steps"  I discussed with her that if we do not treat this infection or give her hemodialysis that this would undoubtedly lead to her demise  The patient's daughter states that her mother has a will to live despite her (the daughter) not understanding why she would want to continue like this, but she knows that this is what her mother would want  Thus, will continue with IV antibiotics and attempt to get patient back on hemodialysis  Patient's daughter reiterates that she is a level 3 and would not want artificial nutrition should it make it there  I made her aware that the patient is not eating well at this time, however the patient ate better today and appears improved  · Continue to evaluate patient input  · Revisit goals of care if patient does not show improvement of appetite  Swelling of extremity  Assessment & Plan  · Right arm swelling noted on exam- likely IV infiltration  · No clot noted on venous duplex of right arm, radial pulse +2    Oropharyngeal dysphagia  Assessment & Plan  · VBS during previous hospitalization recommended modified diet  Patient noted to have facial grimace during evaluation but unfortunately she is so unreliable with her underlying cognitive impairment so I would suggest that we use phenol as needed but not pursue invasive ENT evaluation at this point  · SLP evaluated and recommend Dysphagia level 2 with nectar thickened liquids  · Aspiration precautions  · Continue to monitor    Continuous opioid dependence ([address] )  Assessment & Plan  · Continue home medications (fentanyl)    Chronic Aphasia / Cognitive impairment  Assessment & Plan  · History of stroke  · Supportive care  · Patient is a very limited historian  · As per daughter she has a will to live   Daughter aware little will change in her functioning  Essential hypertension  Assessment & Plan  · BP acceptable   · Continue medications       VTE Pharmacologic Prophylaxis:   Pharmacologic: Heparin  Mechanical VTE Prophylaxis in Place: Yes    Patient Centered Rounds: I have performed bedside rounds with nursing staff today  Discussions with Specialists or Other Care Team Provider: Nursing, CM, ID, nephrology     Education and Discussions with Family / Patient: I have answered all questions to the best of my ability  Time Spent for Care: 20 minutes  More than 50% of total time spent on counseling and coordination of care as described above  Current Length of Stay: 8 day(s)    Current Patient Status: Inpatient   Certification Statement: The patient will continue to require additional inpatient hospital stay due to insertion of permcath, IV ABX, restart HD    Discharge Plan: Likely in 24 hours after dialysis with new permcath    Code Status: Level 3 - DNAR and DNI      Subjective:   Offers no complaints  Poor historian due to dementia  Able to tell me her name and her daughter's name  Objective:     Vitals:   Temp (24hrs), Av 3 °F (37 4 °C), Min:99 1 °F (37 3 °C), Max:99 5 °F (37 5 °C)    Temp:  [99 1 °F (37 3 °C)-99 5 °F (37 5 °C)] 99 1 °F (37 3 °C)  HR:  [77-85] 85  Resp:  [15-20] 20  BP: (137-165)/(61-74) 165/70  SpO2:  [95 %-96 %] 95 %  Body mass index is 31 01 kg/m²  Input and Output Summary (last 24 hours): Intake/Output Summary (Last 24 hours) at 2020 1418  Last data filed at 2020 5312  Gross per 24 hour   Intake 0 ml   Output    Net 0 ml       Physical Exam:     Physical Exam   Constitutional: She appears well-developed  No distress  Pleasantly confused female resting comfortably in bed on room air    HENT:   Head: Normocephalic  Neck: Normal range of motion  Cardiovascular: Normal rate  Pulmonary/Chest: Effort normal  No accessory muscle usage  No tachypnea  No respiratory distress   She has decreased breath sounds in the right lower field  She has wheezes in the right upper field, the right lower field, the left upper field and the left lower field  She has no rhonchi  She has no rales  Moist cough productive for white sputum    Abdominal: Soft  Bowel sounds are normal  She exhibits no distension  Musculoskeletal: Normal range of motion  She exhibits edema (trace RUE)  Neurological: She is alert  Alert to person  Knows her name and her daughters name  Able to follow simple commands  Skin: Skin is warm and dry  She is not diaphoretic  Psychiatric: She has a normal mood and affect  Judgment normal    Nursing note and vitals reviewed  Additional Data:     Labs:    Results from last 7 days   Lab Units 01/06/20  0556  01/02/20  0518   WBC Thousand/uL 6 72   < > 6 01   HEMOGLOBIN g/dL 9 6*   < > 9 6*   HEMATOCRIT % 31 4*   < > 31 0*   PLATELETS Thousands/uL 200   < > 118*   NEUTROS PCT %  --   --  59   LYMPHS PCT %  --   --  26   MONOS PCT %  --   --  10   EOS PCT %  --   --  5    < > = values in this interval not displayed  Results from last 7 days   Lab Units 01/06/20  0556   POTASSIUM mmol/L 4 8   CHLORIDE mmol/L 100   CO2 mmol/L 24   BUN mg/dL 39*   CREATININE mg/dL 5 79*   CALCIUM mg/dL 8 1*           * I Have Reviewed All Lab Data Listed Above  * Additional Pertinent Lab Tests Reviewed: All Labs Within Last 24 Hours Reviewed    Imaging:    Imaging Reports Reviewed Today Include: CXR, CT head  Imaging Personally Reviewed by Myself Includes:  None    Recent Cultures (last 7 days):     Results from last 7 days   Lab Units 01/03/20  0622 01/03/20  0611 12/31/19  1020 12/31/19  0959   BLOOD CULTURE  No Growth at 72 hrs  No Growth at 72 hrs  No Growth After 5 Days   Staphylococcus epidermidis*   GRAM STAIN RESULT   --   --   --  Gram positive cocci in clusters*       Last 24 Hours Medication List:     Current Facility-Administered Medications:  acetaminophen 650 mg Oral Q6H PRN Bennie Jack, DO   albuterol 2 5 mg Nebulization Q6H PRN Capri Hoffman MD   amLODIPine 10 mg Oral Daily Diane Hart MD   aspirin 81 mg Oral Daily Tomeka King, DO   atorvastatin 40 mg Oral QPM Tomeka King, DO   benzonatate 100 mg Oral TID PRN Todd Del Cid PA-C   bisacodyl 5 mg Oral Daily PRN Tomeka King, DO   busPIRone 5 mg Oral TID Tomeka King, DO   [START ON 1/11/2020] cefazolin 3,000 mg Intravenous After Dialysis Juliana Urbina MD   cefazolin 1,000 mg Intravenous Q24H Juliana Urbina MD   [START ON 1/7/2020] cefazolin 2,000 mg Intravenous After Dialysis Juliana Urbina MD   doxercalciferol 1 mcg Intravenous After Dialysis Tomeka King, DO   fentaNYL 1 patch Transdermal Q72H Tomeka King,    heparin (porcine) 5,000 Units Subcutaneous Q8H [address] Christin Johnson PA-C   ipratropium-albuterol 3 mL Nebulization Q6H TIANA Foster   ketorolac 1 drop Both Eyes 4x Daily Tomeka Kign, DO   metoprolol tartrate 50 mg Oral Q12H [address] Lito Marion MD   nitroglycerin 0 4 mg Sublingual Q5 Min PRN Tomeka King, DO   ondansetron 4 mg Intravenous Q6H PRN TIANA Guzmán   phenol 1 spray Mouth/Throat Q2H PRN Christin Johnson PA-C   polyethylene glycol 17 g Oral Daily Tomeka King,    senna 1 tablet Oral HS Lucio Velasquez PA-C   sertraline 50 mg Oral Daily Tomeka King,    sevelamer 800 mg Oral TID With Meals Tomeka King DO        Today, Patient Was Seen By: TIANA Foster    ** Please Note: Dictation voice to text software may have been used in the creation of this document   **

## 2020-01-07 ENCOUNTER — APPOINTMENT (INPATIENT)
Dept: DIALYSIS | Facility: HOSPITAL | Age: 85
DRG: 193 | End: 2020-01-07
Attending: INTERNAL MEDICINE
Payer: MEDICARE

## 2020-01-07 ENCOUNTER — APPOINTMENT (INPATIENT)
Dept: CT IMAGING | Facility: HOSPITAL | Age: 85
DRG: 193 | End: 2020-01-07
Payer: MEDICARE

## 2020-01-07 DIAGNOSIS — R78.81 GRAM-POSITIVE BACTEREMIA: Primary | ICD-10-CM

## 2020-01-07 PROBLEM — R22.1 NECK MASS: Status: ACTIVE | Noted: 2020-01-07

## 2020-01-07 LAB
ANION GAP SERPL CALCULATED.3IONS-SCNC: 16 MMOL/L (ref 4–13)
BUN SERPL-MCNC: 50 MG/DL (ref 5–25)
CALCIUM SERPL-MCNC: 8.3 MG/DL (ref 8.3–10.1)
CHLORIDE SERPL-SCNC: 101 MMOL/L (ref 100–108)
CO2 SERPL-SCNC: 21 MMOL/L (ref 21–32)
CREAT SERPL-MCNC: 6.73 MG/DL (ref 0.6–1.3)
GFR SERPL CREATININE-BSD FRML MDRD: 6 ML/MIN/1.73SQ M
GLUCOSE SERPL-MCNC: 75 MG/DL (ref 65–140)
MAGNESIUM SERPL-MCNC: 2.3 MG/DL (ref 1.6–2.6)
PHOSPHATE SERPL-MCNC: 3.2 MG/DL (ref 2.3–4.1)
POTASSIUM SERPL-SCNC: 5.4 MMOL/L (ref 3.5–5.3)
SODIUM SERPL-SCNC: 138 MMOL/L (ref 136–145)

## 2020-01-07 PROCEDURE — 83735 ASSAY OF MAGNESIUM: CPT | Performed by: NURSE PRACTITIONER

## 2020-01-07 PROCEDURE — 99232 SBSQ HOSP IP/OBS MODERATE 35: CPT | Performed by: INTERNAL MEDICINE

## 2020-01-07 PROCEDURE — 84100 ASSAY OF PHOSPHORUS: CPT | Performed by: NURSE PRACTITIONER

## 2020-01-07 PROCEDURE — 80048 BASIC METABOLIC PNL TOTAL CA: CPT | Performed by: NURSE PRACTITIONER

## 2020-01-07 PROCEDURE — 99232 SBSQ HOSP IP/OBS MODERATE 35: CPT | Performed by: NURSE PRACTITIONER

## 2020-01-07 PROCEDURE — 92526 ORAL FUNCTION THERAPY: CPT

## 2020-01-07 PROCEDURE — 94760 N-INVAS EAR/PLS OXIMETRY 1: CPT

## 2020-01-07 PROCEDURE — 90935 HEMODIALYSIS ONE EVALUATION: CPT | Performed by: INTERNAL MEDICINE

## 2020-01-07 PROCEDURE — 70490 CT SOFT TISSUE NECK W/O DYE: CPT

## 2020-01-07 PROCEDURE — 94640 AIRWAY INHALATION TREATMENT: CPT

## 2020-01-07 RX ADMIN — SENNOSIDES 8.6 MG: 8.6 TABLET, FILM COATED ORAL at 21:43

## 2020-01-07 RX ADMIN — KETOROLAC TROMETHAMINE 1 DROP: 5 SOLUTION OPHTHALMIC at 11:51

## 2020-01-07 RX ADMIN — METOPROLOL TARTRATE 50 MG: 50 TABLET, FILM COATED ORAL at 21:43

## 2020-01-07 RX ADMIN — KETOROLAC TROMETHAMINE 1 DROP: 5 SOLUTION OPHTHALMIC at 18:02

## 2020-01-07 RX ADMIN — IPRATROPIUM BROMIDE AND ALBUTEROL SULFATE 3 ML: 2.5; .5 SOLUTION RESPIRATORY (INHALATION) at 07:18

## 2020-01-07 RX ADMIN — HEPARIN SODIUM 5000 UNITS: 5000 INJECTION INTRAVENOUS; SUBCUTANEOUS at 21:44

## 2020-01-07 RX ADMIN — IPRATROPIUM BROMIDE AND ALBUTEROL SULFATE 3 ML: 2.5; .5 SOLUTION RESPIRATORY (INHALATION) at 13:29

## 2020-01-07 RX ADMIN — FENTANYL 1 PATCH: 25 PATCH TRANSDERMAL at 21:35

## 2020-01-07 RX ADMIN — BUSPIRONE HYDROCHLORIDE 5 MG: 5 TABLET ORAL at 18:01

## 2020-01-07 RX ADMIN — KETOROLAC TROMETHAMINE 1 DROP: 5 SOLUTION OPHTHALMIC at 21:46

## 2020-01-07 RX ADMIN — BUSPIRONE HYDROCHLORIDE 5 MG: 5 TABLET ORAL at 21:44

## 2020-01-07 RX ADMIN — DOXERCALCIFEROL 1 MCG: 4 INJECTION, SOLUTION INTRAVENOUS at 14:05

## 2020-01-07 RX ADMIN — SEVELAMER HYDROCHLORIDE 800 MG: 800 TABLET, FILM COATED PARENTERAL at 09:55

## 2020-01-07 RX ADMIN — CEFAZOLIN SODIUM 2000 MG: 2 SOLUTION INTRAVENOUS at 15:24

## 2020-01-07 RX ADMIN — HEPARIN SODIUM 5000 UNITS: 5000 INJECTION INTRAVENOUS; SUBCUTANEOUS at 05:09

## 2020-01-07 RX ADMIN — ATORVASTATIN CALCIUM 40 MG: 40 TABLET, FILM COATED ORAL at 18:01

## 2020-01-07 RX ADMIN — KETOROLAC TROMETHAMINE 1 DROP: 5 SOLUTION OPHTHALMIC at 09:55

## 2020-01-07 RX ADMIN — IPRATROPIUM BROMIDE AND ALBUTEROL SULFATE 3 ML: 2.5; .5 SOLUTION RESPIRATORY (INHALATION) at 19:31

## 2020-01-07 RX ADMIN — IPRATROPIUM BROMIDE AND ALBUTEROL SULFATE 3 ML: 2.5; .5 SOLUTION RESPIRATORY (INHALATION) at 01:32

## 2020-01-07 NOTE — ASSESSMENT & PLAN NOTE
POA, left neck lump, mobile  Neck US: 'Large ovoid heterogeneous mass in the left submandibular region, probably an enlarged abnormal lymph node rather than an exophytic salivary gland lesion arising from the submandibular gland   Dedicated neck CT with contrast would be helpful for further evaluation '  · Will discuss with daughter if she would like to investigate further with a CT scan

## 2020-01-07 NOTE — SPEECH THERAPY NOTE
Speech Language/Pathology    Speech/Language Pathology Progress Note    Patient Name: Meghan Khan  [id number] Date: 1/7/2020       Subjective:  Pt asleep upon arrival  Easily roused to auditory stim  Per RN, pt appeared to have some trouble chewing soft solid foods  Pt w/ increased lethargy  Pt poor historian, responding to questions w/ groans  Objective:  Pt seen for dysphagia follow up  Assessed tolerance of current diet at breakfast w/ pureed waffle, banana, NTL by straw, and possible advancement with thins by straw  Dependent for feeding  Prolonged/decreased mastication w/ soft solid requiring cues to maintain eye opening  Suspect reduced oral control w/ thin>NTL  Oral holding vs  mildly delayed swallow initiation noted w/ liquids/solids  Immediate wet coughing episode following small, single sip of thins  No overt s/s aspiration w/ NTL  Pt continues w/ facial grimacing and c/o odynophagia  Assessment:  Pt w/ prolonged/decreased mastication of soft solid and immediate overt s/s aspiration w/ thin liquids Rec downgrade to puree and continue NTL  Plan/Recommendations:  Rec downgrade to puree   Cont NTL  Meds whole/crushed in puree  Full feed, aspiration precautions (only feed when fully awake/alert, small bites/sips, slow rate, fully upright)  Will cont to follow

## 2020-01-07 NOTE — ASSESSMENT & PLAN NOTE
Per cardiology, tis is a non MI troponin elevation in the setting of influenza and hypertension  Further ischemic evaluation is not warranted since she is also asymptomatic continue  · Anticoagulation - heparin drip initially, now stopped  · Anti-platelet -  Aspirin  · Beta-blocker - continue labetalol  · Statin - continue atorvastatin  · Nitrate - PRN Nitroglycerin  · Evaluation -   · Echocardiogram: Ejection fraction was estimated in the range of 55 % to 60 %  There was mild hypokinesis of the entire inferior wall(s)  There was possible hypokinesis of the basal-mid inferolateral wall(s)  grade 1 diastolic dysfunction  · Trops elevated with max 2 19, EKG without ischemic change  · Currently patient denies any chest pain symptoms, but poor historian

## 2020-01-07 NOTE — ASSESSMENT & PLAN NOTE
· POA, both blood cultures on admission positive for staph epidermidis, likely due to left anterior chest PermCath   Repeat cultures negative at 72 hours   · ID consult appreciated  · S/p insertion of new Perm Cath on 1/6  · Continue with IV Ancef after HD through 1/16/19  · Repeat blood cultures on 1/301/9   · Will need weekly CBC/D while on antibiotic   · No formal ID follow-up required

## 2020-01-07 NOTE — ASSESSMENT & PLAN NOTE
· Previous provider had a long discussion with the patient's daughter concerning her mother's condition and the "next steps"  The patient's daughter states that her mother has a will to live despite her (the daughter) not understanding why she would want to continue like this, but she knows that this is what her mother would want  Patient's daughter reiterates that she is a level 3 and would not want artificial nutrition should it make it there    · Continue to evaluate patient input  · Revisit goals of care if patient does not show improvement of appetite

## 2020-01-07 NOTE — ASSESSMENT & PLAN NOTE
· Right arm swelling noted on exam - likely IV infiltration  · No clot noted on venous duplex of right arm, radial pulse +2

## 2020-01-07 NOTE — MALNUTRITION/BMI
This medical record reflects one or more clinical indicators suggestive of malnutrition and/or morbid obesity  Malnutrition Findings:   Malnutrition type: Acute illness(in the sitting of chronic illness)  Degree of Malnutrition: Malnutrition of mild degree(related to inadequate oral intake )  Malnutrition Characteristics: Inadequate energy, Fluid accumulation(as evidenced by 7 day period of poor intake, stress of illness, skin breakdown)    BMI Findings: Body mass index is 28 66 kg/m²  See Nutrition note dated 1/7/2020 for additional details  Completed nutrition assessment is viewable in the nutrition documentation

## 2020-01-07 NOTE — PLAN OF CARE
Problem: Potential for Falls  Goal: Patient will remain free of falls  Description  INTERVENTIONS:  - Assess patient frequently for physical needs  -  Identify cognitive and physical deficits and behaviors that affect risk of falls    -  [place] fall precautions as indicated by assessment   - Educate patient/family on patient safety including physical limitations  - Instruct patient to call for assistance with activity based on assessment  - Modify environment to reduce risk of injury  - Consider OT/PT consult to assist with strengthening/mobility  Outcome: Progressing     Problem: Prexisting or High Potential for Compromised Skin Integrity  Goal: Skin integrity is maintained or improved  Description  INTERVENTIONS:  - Identify patients at risk for skin breakdown  - Assess and monitor skin integrity  - Assess and monitor nutrition and hydration status  - Monitor labs   - Assess for incontinence   - Turn and reposition patient  - Assist with mobility/ambulation  - Relieve pressure over bony prominences  - Avoid friction and shearing  - Provide appropriate hygiene as needed including keeping skin clean and dry  - Evaluate need for skin moisturizer/barrier cream  - Collaborate with interdisciplinary team   - Patient/family teaching  - Consider wound care consult   Outcome: Progressing     Problem: METABOLIC, FLUID AND ELECTROLYTES - ADULT  Goal: Electrolytes maintained within normal limits  Description  INTERVENTIONS:  - Monitor labs and assess patient for signs and symptoms of electrolyte imbalances  - Administer electrolyte replacement as ordered  - Monitor response to electrolyte replacements, including repeat lab results as appropriate  - Instruct patient on fluid and nutrition as appropriate  Outcome: Progressing  Goal: Fluid balance maintained  Description  INTERVENTIONS:  - Monitor labs   - Monitor I/O and WT  - Instruct patient on fluid and nutrition as appropriate  - Assess for signs & symptoms of volume excess or deficit  Outcome: Progressing     Problem: Nutrition/Hydration-ADULT  Goal: Nutrient/Hydration intake appropriate for improving, restoring or maintaining nutritional needs  Description  Monitor and assess patient's nutrition/hydration status for malnutrition  Collaborate with interdisciplinary team and initiate plan and interventions as ordered  Monitor patient's weight and dietary intake as ordered or per policy  Utilize nutrition screening tool and intervene as necessary  Determine patient's food preferences and provide high-protein, high-caloric foods as appropriate       INTERVENTIONS:  - Monitor oral intake, urinary output, labs, and treatment plans  - Assess nutrition and hydration status and recommend course of action  - Evaluate amount of meals eaten  - Assist patient with eating if necessary   - Allow adequate time for meals  - Recommend/ encourage appropriate diets, oral nutritional supplements, and vitamin/mineral supplements  - Order, calculate, and assess calorie counts as needed  - Recommend, monitor, and adjust tube feedings and TPN/PPN based on assessed needs  - Assess need for intravenous fluids  - Provide specific nutrition/hydration education as appropriate  - Include patient/family/caregiver in decisions related to nutrition  Outcome: Progressing

## 2020-01-07 NOTE — PROGRESS NOTES
NEPHROLOGY PROGRESS NOTE    Meghan Khan 81 y o  female MRN: [id number]  Unit/Bed#: S -01 Encounter: [id number]  Reason for Consult:  End-stage renal disease    The patient was seen personally by myself while on dialysis  She seems very lethargic and does not really answer questions except with the yes or no  Other than that she is stable and in no distress  ASSESSMENT/PLAN:  1  Renal    Patient is end-stage renal disease normal dialysis is   She had a new PermCath placed after the other 1 was removed for bacteremia and she is seen on dialysis by myself  Dialysis treatment is outline below  HEMODIALYSIS PROCEDURE NOTE  The patient was seen and examined on hemodialysis  Time: 3 5 hours  Sodium: 138 Blood flow: 350   Dialyzer: F160 Potassium: 2 Dialysate flow: 800   Access: catheter Bicarbonate: 30 Ultrafiltration goal: 2        Will continue dialysis on usual schedule of   2  Bacteremia    The patient had bacteremia had PermCath removed  Blood cultures were checked in surveillance were negative so new PermCath was replaced  The patient is going to receive cefazolin through 2020 per Infectious Disease  I will arrange for this when patient is discharged  3  Influenza  Status post Tamiflu  SUBJECTIVE:  ROS  Patient is in no distress but is lethargic and not able to give accurate review of systems on questioning  OBJECTIVE:  Current Weight: Weight - Scale: 73 4 kg (161 lb 13 1 oz)  Vitals:Temp (24hrs), Av 3 °F (36 8 °C), Min:97 9 °F (36 6 °C), Max:98 9 °F (37 2 °C)  Current: Temperature: 98 9 °F (37 2 °C)   Blood pressure (!) 114/43, pulse 64, temperature 98 9 °F (37 2 °C), temperature source Oral, resp  rate 20, height 5' 3" (1 6 m), weight 73 4 kg (161 lb 13 1 oz), SpO2 93 %  , Body mass index is 28 66 kg/m²        Intake/Output Summary (Last 24 hours) at 2020 1213  Last data filed at 2020 1100  Gross per 24 hour Intake 425 ml   Output    Net 425 ml       Physical Exam: BP (!) 114/43 (BP Location: Left arm)   Pulse 64   Temp 98 9 °F (37 2 °C) (Oral)   Resp 20   Ht 5' 3" (1 6 m)   Wt 73 4 kg (161 lb 13 1 oz)   SpO2 93%   BMI 28 66 kg/m²   Physical Exam   Constitutional:  Non-toxic appearance  She appears ill  Neck: Neck supple  No JVD present  Cardiovascular: Normal rate and regular rhythm  Exam reveals no gallop and no friction rub  Pulmonary/Chest: Effort normal  No respiratory distress  She has no decreased breath sounds  She has no wheezes  She has no rhonchi  She has no rales  Abdominal: Soft  Bowel sounds are normal  She exhibits no distension  There is no tenderness  Neurological:   Lethargic  Unable to assess mental status as not answering appropriately         Medications:    Current Facility-Administered Medications:     acetaminophen (TYLENOL) tablet 650 mg, 650 mg, Oral, Q6H PRN, Elvis Jenkins DO, 650 mg at 01/05/20 0957    albuterol inhalation solution 2 5 mg, 2 5 mg, Nebulization, Q6H PRN, Robby Bhat MD    amLODIPine (NORVASC) tablet 10 mg, 10 mg, Oral, Daily, Marry Whaley MD, Stopped at 01/06/20 9002    aspirin chewable tablet 81 mg, 81 mg, Oral, Daily, Elvis Jenkins DO, Stopped at 01/06/20 0857    atorvastatin (LIPITOR) tablet 40 mg, 40 mg, Oral, QPM, Elvis Jenkins DO, 40 mg at 01/06/20 1800    benzonatate (TESSALON PERLES) capsule 100 mg, 100 mg, Oral, TID PRN, Christin Johnson PA-C, 100 mg at 01/03/20 0020    bisacodyl (DULCOLAX) EC tablet 5 mg, 5 mg, Oral, Daily PRN, Elvis Jenkins DO, 5 mg at 12/30/19 1153    busPIRone (BUSPAR) tablet 5 mg, 5 mg, Oral, TID, Elvis Jenkins DO, Stopped at 01/07/20 0954    [START ON 1/11/2020] ceFAZolin (ANCEF) 3,000 mg in dextrose 5 % 100 mL IVPB, 3,000 mg, Intravenous, After Dialysis, Star Vega MD    ceFAZolin (ANCEF) IVPB (premix) 2,000 mg, 2,000 mg, Intravenous, After Dialysis, Star Vega MD  [facility] doxercalciferol (HECTOROL) injection 1 mcg, 1 mcg, Intravenous, After Dialysis, Yvesario Apley, DO, 1 mcg at 01/03/20 1000    fentaNYL (DURAGESIC) 25 mcg/hr TD 72 hr patch 1 patch, 1 patch, Transdermal, Q72H, Nasario Apley, DO, 1 patch at 01/04/20 1950    heparin (porcine) subcutaneous injection 5,000 Units, 5,000 Units, Subcutaneous, Q8H [address], Christin Johnson PA-C, 5,000 Units at 01/07/20 0509    ipratropium-albuterol (DUO-NEB) 0 5-2 5 mg/3 mL inhalation solution 3 mL, 3 mL, Nebulization, Q6H, TIANA Pepper, 3 mL at 01/07/20 0718    ketorolac (ACULAR) 0 5 % ophthalmic solution 1 drop, 1 drop, Both Eyes, 4x Daily, Nasario Apley, DO, 1 drop at 01/07/20 1151    metoprolol tartrate (LOPRESSOR) tablet 50 mg, 50 mg, Oral, Q12H [address], Trupti Anderson MD, Stopped at 01/06/20 0857    nitroglycerin (NITROSTAT) SL tablet 0 4 mg, 0 4 mg, Sublingual, Q5 Min PRN, Nasario Apley, DO    ondansetron [facility]) injection 4 mg, 4 mg, Intravenous, Q6H PRN, TIANA Guzmán, 4 mg at 12/31/19 1318    phenol (CHLORASEPTIC) 1 4 % mucosal liquid 1 spray, 1 spray, Mouth/Throat, Q2H PRN, Christin Johnson PA-C, 1 spray at 01/06/20 1442    polyethylene glycol (MIRALAX) packet 17 g, 17 g, Oral, Daily, Nasario Apley, DO, Stopped at 01/06/20 0857    senna (SENOKOT) tablet 8 6 mg, 1 tablet, Oral, HS, Lucio Velasquez PA-C    sertraline (ZOLOFT) tablet 50 mg, 50 mg, Oral, Daily, Nasario Apley, DO, Stopped at 01/06/20 0857    sevelamer (RENAGEL) tablet 800 mg, 800 mg, Oral, TID With Meals, Nasario Apley, DO, 800 mg at 01/07/20 0955    Laboratory Results:  Lab Results   Component Value Date    WBC 6 72 01/06/2020    HGB 9 6 (L) 01/06/2020    HCT 31 4 (L) 01/06/2020    MCV 90 01/06/2020     01/06/2020     Lab Results   Component Value Date    SODIUM 138 01/07/2020    K 5 4 (H) 01/07/2020     01/07/2020    CO2 21 01/07/2020    BUN 50 (H) 01/07/2020    CREATININE 6 73 (H) 01/07/2020    GLUC 75 01/07/2020 CALCIUM 8 3 01/07/2020     Lab Results   Component Value Date    CALCIUM 8 3 01/07/2020    PHOS 3 2 01/07/2020     No results found for: LABPROT

## 2020-01-07 NOTE — ASSESSMENT & PLAN NOTE
· Nephrology managing, appreciate input   Normal schedule of HD is TTS  · PermCath removed 1/3/2020  · Insertion of new PermCath on 1/6/2020 as repeat blood cultures negative at 72 hours   · Nephrology following, appreciate input  · Planning for HD today with new Permcath  · Monitor for tolerance  · BMP in am

## 2020-01-07 NOTE — PROGRESS NOTES
Progress Note - Meghan Khan 1935, 81 y o  female MRN: [id number]    Unit/Bed#: S -01 Encounter: [id number]    Primary Care Provider: Jaja White MD   Date and time admitted to hospital: 12/29/2019  7:21 AM        * Gram-positive bacteremia  Assessment & Plan  · POA, both blood cultures on admission positive for staph epidermidis, likely due to left anterior chest PermCath  Repeat cultures negative at 72 hours   · ID consult appreciated  · S/p insertion of new Perm Cath on 1/6  · Continue with IV Ancef after HD through 1/16/19  · Repeat blood cultures on 1/301/9   · Will need weekly CBC/D while on antibiotic   · No formal ID follow-up required     Influenza A  Assessment & Plan  · POA, arrived with shortness of breath from dialysis center  Flu A positive  Status post 5 days Tamiflu  · Continue scheduled Duo-nebs and Albuterol PRN  · Supportive care     ESRD on hemodialysis [facility])  Assessment & Plan  · Nephrology managing, appreciate input  Normal schedule of HD is TTS  · PermCath removed 1/3/2020  · Insertion of new PermCath on 1/6/2020 as repeat blood cultures negative at 72 hours   · Nephrology following, appreciate input  · Planning for HD today with new Permcath  · Monitor for tolerance  · BMP in am     Neck mass  Assessment & Plan  POA, left neck lump, mobile  Neck US: 'Large ovoid heterogeneous mass in the left submandibular region, probably an enlarged abnormal lymph node rather than an exophytic salivary gland lesion arising from the submandibular gland   Dedicated neck CT with contrast would be helpful for further evaluation '  · Will discuss with daughter if she would like to investigate further with a CT scan    non MI troponin elevation  Assessment & Plan  Per cardiology, tis is a non MI troponin elevation in the setting of influenza and hypertension   Further ischemic evaluation is not warranted since she is also asymptomatic continue  · Anticoagulation - heparin drip initially, now stopped  · Anti-platelet -  Aspirin  · Beta-blocker - continue labetalol  · Statin - continue atorvastatin  · Nitrate - PRN Nitroglycerin  · Evaluation -   · Echocardiogram: Ejection fraction was estimated in the range of 55 % to 60 %  There was mild hypokinesis of the entire inferior wall(s)  There was possible hypokinesis of the basal-mid inferolateral wall(s)  grade 1 diastolic dysfunction  · Trops elevated with max 2 19, EKG without ischemic change  · Currently patient denies any chest pain symptoms, but poor historian    Goals of care, counseling/discussion  Assessment & Plan  · Previous provider had a long discussion with the patient's daughter concerning her mother's condition and the "next steps"  The patient's daughter states that her mother has a will to live despite her (the daughter) not understanding why she would want to continue like this, but she knows that this is what her mother would want  Patient's daughter reiterates that she is a level 3 and would not want artificial nutrition should it make it there  · Continue to evaluate patient input  · Revisit goals of care if patient does not show improvement of appetite    Swelling of extremity  Assessment & Plan  · Right arm swelling noted on exam - likely IV infiltration  · No clot noted on venous duplex of right arm, radial pulse +2    Oropharyngeal dysphagia  Assessment & Plan  · VBS during previous hospitalization recommended modified diet   Patient noted to have facial grimace during evaluation but unfortunately she is so unreliable with her underlying cognitive impairment so I would suggest that we use phenol as needed but not pursue invasive ENT evaluation at this point  · SLP evaluated and recommend Dysphagia level 2 with nectar thickened liquids  · Aspiration precautions  · Continue to monitor    Continuous opioid dependence ([address] )  Assessment & Plan  · Continue home medications (fentanyl)    Chronic Aphasia / Cognitive impairment  Assessment & Plan  · History of stroke  · Supportive care  · Patient is a very limited historian  · As per daughter she has a will to live  Daughter aware little will change in her functioning  Essential hypertension  Assessment & Plan  · BP acceptable   · Continue medications       VTE Pharmacologic Prophylaxis:   Pharmacologic: Heparin  Mechanical VTE Prophylaxis in Place: Yes    Patient Centered Rounds: I have performed bedside rounds with nursing staff today  Discussions with Specialists or Other Care Team Provider: Nursing, CM, ID, nephrology     Education and Discussions with Family / Patient: I have answered all questions to the best of my ability  Called daughter     Time Spent for Care: 20 minutes  More than 50% of total time spent on counseling and coordination of care as described above  Current Length of Stay: 9 day(s)    Current Patient Status: Inpatient   Certification Statement: The patient will continue to require additional inpatient hospital stay due to insertion of permcath, restart HD    Discharge Plan: Likely back to nursing home tomorrow if tolerates dialysis via new permcath today    Code Status: Level 3 - DNAR and DNI      Subjective:   Poor historian due to dementia  Able to follow simple commands  Knows her name  Offers no complaints  Objective:     Vitals:   Temp (24hrs), Av 3 °F (36 8 °C), Min:97 9 °F (36 6 °C), Max:98 9 °F (37 2 °C)    Temp:  [97 9 °F (36 6 °C)-98 9 °F (37 2 °C)] 98 9 °F (37 2 °C)  HR:  [73-90] 90  Resp:  [13-20] 20  BP: (116-156)/(51-74) 142/53  SpO2:  [92 %-100 %] 93 %  Body mass index is 28 66 kg/m²  Input and Output Summary (last 24 hours): Intake/Output Summary (Last 24 hours) at 2020 1148  Last data filed at 2020 1100  Gross per 24 hour   Intake 425 ml   Output    Net 425 ml       Physical Exam:     Physical Exam   Constitutional: She appears well-developed  No distress     Very quiet female, pleasantly confused, resting comfortably in bed    HENT:   Head: Normocephalic  Left neck mass, mobile   Neck: Normal range of motion  Cardiovascular: Normal rate  Pulmonary/Chest: Effort normal and breath sounds normal  She has no rhonchi  She has no rales  Abdominal: Soft  Bowel sounds are normal  She exhibits no distension  Musculoskeletal: She exhibits edema (trace RUE)  She exhibits no tenderness  Neurological: She is alert  Alert to person, disoriented to time and place    Skin: Skin is warm and dry  Capillary refill takes less than 2 seconds  She is not diaphoretic  Right anterior chest wall PermCath in place  Prior left anterior chest PermCath removed, skin dry   Psychiatric: She has a normal mood and affect  Nursing note and vitals reviewed  Additional Data:     Labs:    Results from last 7 days   Lab Units 01/06/20  0556  01/02/20  0518   WBC Thousand/uL 6 72   < > 6 01   HEMOGLOBIN g/dL 9 6*   < > 9 6*   HEMATOCRIT % 31 4*   < > 31 0*   PLATELETS Thousands/uL 200   < > 118*   NEUTROS PCT %  --   --  59   LYMPHS PCT %  --   --  26   MONOS PCT %  --   --  10   EOS PCT %  --   --  5    < > = values in this interval not displayed  Results from last 7 days   Lab Units 01/07/20  0439   POTASSIUM mmol/L 5 4*   CHLORIDE mmol/L 101   CO2 mmol/L 21   BUN mg/dL 50*   CREATININE mg/dL 6 73*   CALCIUM mg/dL 8 3           * I Have Reviewed All Lab Data Listed Above  * Additional Pertinent Lab Tests Reviewed: All Labs Within Last 24 Hours Reviewed    Imaging:    Imaging Reports Reviewed Today Include: CXR, CT head  Imaging Personally Reviewed by Myself Includes:  None    Recent Cultures (last 7 days):     Results from last 7 days   Lab Units 01/03/20  0622 01/03/20  0611   BLOOD CULTURE  No Growth After 4 Days  No Growth After 4 Days         Last 24 Hours Medication List:     Current Facility-Administered Medications:  acetaminophen 650 mg Oral Q6H PRN Twylla , DO   albuterol 2 5 mg Nebulization Q6H PRN Von Duenas MD   amLODIPine 10 mg Oral Daily Bethann Hammans, MD   aspirin 81 mg Oral Daily Latanya Cox DO   atorvastatin 40 mg Oral QPM Latanya Cox DO   benzonatate 100 mg Oral TID PRN Mary Shah PA-C   bisacodyl 5 mg Oral Daily PRN Latanya Cox DO   busPIRone 5 mg Oral TID Latanya Cox DO   [START ON 1/11/2020] cefazolin 3,000 mg Intravenous After Dialysis Dominick Anderson MD   cefazolin 2,000 mg Intravenous After Dialysis Dominick Anderson MD   doxercalciferol 1 mcg Intravenous After Dialysis Latanya Cox DO   fentaNYL 1 patch Transdermal Q72H Latanya Cox DO   heparin (porcine) 5,000 Units Subcutaneous Q8H [address] Christin Johnson PA-C   ipratropium-albuterol 3 mL Nebulization Q6H TIANA Hernandez   ketorolac 1 drop Both Eyes 4x Daily Latanya Cox DO   metoprolol tartrate 50 mg Oral Q12H [address] Kenny Rahman MD   nitroglycerin 0 4 mg Sublingual Q5 Min PRN Latanya Cox DO   ondansetron 4 mg Intravenous Q6H PRN TIANA Guzmán   phenol 1 spray Mouth/Throat Q2H PRN Christin Johnson PA-C   polyethylene glycol 17 g Oral Daily Latanya Cox DO   senna 1 tablet Oral HS Lucio Velasquez PA-C   sertraline 50 mg Oral Daily Latanya Cox DO   sevelamer 800 mg Oral TID With Meals Latanya Cox DO        Today, Patient Was Seen By: TIANA Hernandez    ** Please Note: Dictation voice to text software may have been used in the creation of this document   **

## 2020-01-07 NOTE — DISCHARGE INSTRUCTIONS
Perma-cath Placement   WHAT YOU NEED TO KNOW:   A perma-cath is a catheter placed through a vein into or near your right atrium  Your right atrium is the right upper chamber of your heart  A perma-cath is used for dialysis in an emergency or until a long-term device is ready to use  After your procedure, you will have some pain and swelling on your chest and neck  You may have some bruises on your chest and neck  You may also have 2 dressings, one on your chest and one on your neck  DISCHARGE INSTRUCTIONS:   Call 911 for any of the following:   · You feel lightheaded, short of breath, and have chest pain  · Your catheter comes out   Contact Interventional Radiology at [phone] [place] PATIENTS: Contact Interventional Radiology at [phone]) Jad Machado PATIENTS: Contact Interventional Radiology at [phone]) if:  · Blood soaks through your bandage  · You have new swelling in your arm, neck, face, or chest on your right side  · Your catheter gets wet  · Your bruises or pain get worse  · You have a fever or chills  · Persistent nausea or vomiting  · Your incision is red, swollen, or draining pus  · You have questions or concerns about your condition or care  Self-care:       · Resume your normal diet  · Keep your dressings dry  Do not take a shower or swim  You may take a tub bath, but do not get your dressings wet  Water in your wound can cause bacteria to grow and cause an infection  If your dressing gets wet, dry it off and cover it with dry sterile gauze  Call your healthcare provider  Do not use soaps or ointments  · Do not change your dressings  Your healthcare provider or dialysis nurse will change your dressings  Your dressings should stay in place until your healthcare provider removes them  The dressing on your chest will stay as long as you have the catheter in place  The dressing prevents infection  · Do not remove the red and blue caps from the end of your catheter   The caps prevent air from getting into your catheter  Follow up with your healthcare provider as directed: Write down your questions so you remember to ask them during your visits  _________________________________________________________________________________     Infectious Disease discharge instructions: You are currently on IV antibiotics to treat a bloodstream infection due to year recent catheter exchange  You are to continue Ancef dosed with her dialysis session through 01/16/2020  We will obtain surveillance blood cultures on 01/30/2020  Weekly labs will be collected including CBC with differential while you are on antibiotic  You will not require formal follow-up in our office  Our office staff will follow up on your lab work and cultures remotely  Office number provided above should you have any questions  Drug information provided below for your reference  Cefazolin (By injection)   Cefazolin Sodium (evx-V-fmc-sohail VIPUL-fidel-um)  Treats or prevents serious infections caused by bacteria  This medicine is a cephalosporin antibiotic  Brand Name(s): [facility] Rx ceFAZolin, ceFAZolin Novaplus   There may be other brand names for this medicine  When This Medicine Should Not Be Used: This medicine is not right for everyone  Do not use it if you had an allergic reaction to cefazolin or any other cephalosporin antibiotic  How to Use This Medicine:   Injectable  · Your doctor will prescribe your exact dose and tell you how often it should be given  This medicine is given as a shot into a muscle or vein  · A nurse or other trained health professional will give you this medicine  · Missed dose: You must use this medicine on a fixed schedule  Call your doctor or pharmacist if you miss a dose  Drugs and Foods to Avoid:   Ask your doctor or pharmacist before using any other medicine, including over-the-counter medicines, vitamins, and herbal products  · Some medicines can affect how cefazolin works   Tell your doctor if you are taking probenecid or a blood thinner, such as warfarin  Warnings While Using This Medicine:   · Tell your doctor if you are pregnant or breastfeeding, you had an allergic reaction to penicillin, or you have liver disease, kidney disease, or a history of colitis  · Tell any doctor or dentist who treats you that you are using this medicine  This medicine may affect certain medical test results  · This medicine can cause diarrhea  Call your doctor if the diarrhea becomes severe, does not stop, or is bloody  Do not take any medicine to stop diarrhea until you have talked to your doctor  Diarrhea can occur 2 months or more after you stop taking this medicine  · Take all of the medicine in your prescription to clear up your infection, even if you feel better after the first few doses  · Call your doctor if your symptoms do not improve or if they get worse  Possible Side Effects While Using This Medicine:   Call your doctor right away if you notice any of these side effects:  · Allergic reaction: Itching or hives, swelling in your face or hands, swelling or tingling in your mouth or throat, chest tightness, trouble breathing  · Blistering, peeling, red skin rash  · Dark urine or pale stools, nausea, vomiting, loss of appetite, stomach pain, yellow skin or eyes  · Diarrhea that may contain blood  · Swelling, pain, or redness on your skin where the IV needle is placed  · Unusual bleeding, bruising, or weakness  If you notice these less serious side effects, talk with your doctor:   · Mild nausea, vomiting, stomach cramps, or loss of appetite  · Pain where the shot is given  · Sores or white patches on your lips, mouth, or throat  · Vaginal itching or discharge  If you notice other side effects that you think are caused by this medicine, tell your doctor  Call your doctor for medical advice about side effects   You may report side effects to FDA at 0-862-FDA-2687  © 2017 [address] [employer] Information is for End User's use only and may not be sold, redistributed or otherwise used for commercial purposes  The above information is an  only  It is not intended as medical advice for individual conditions or treatments  Talk to your doctor, nurse or pharmacist before following any medical regimen to see if it is safe and effective for you

## 2020-01-07 NOTE — PROGRESS NOTES
Progress Note - Infectious Disease   Liudmila Kingston 81 y o  female MRN: [id number]  Unit/Bed#: S -01 Encounter: [id number]      Impression/Plan:  1  Staph epidermidis bacteremia  Likely source is PermCath with recent exchange on 12/27  Gisell Crome blood cultures returned positive, and therefore, catheter removed on 01/03   2D echo unremarkable   Patient without other prosthetic devices  Catheter tip 11 colonies of mixed skin zhanna  Repeat blood cultures without growth  Patient had new PermCath placed yesterday with repeat blood cultures negative at 72 hours  Patient transition to IV Cefazolin after HD dosing today with dialysis and with anticipated discharge soon  Plan for total of 2 weeks of antibiotic with HD through 1/16  Plan for surveillance cultures 2 weeks after treatment, 1/30  Ongoing follow-up by Nephrology  Supportive care as per primary     2  Influenza A   PCR was positive on 12/29/2019  Improved respiratory status  Tamiflu course completed  Monitor for respiratory symptoms     3  R arm swelling   Likely secondary to infiltrated IV   She completed a right upper extremity venous duplex, negative for acute DVT and superficial thrombophlebitis                 Serial exams     4  End-stage renal disease  On HD    Will continue antibiotics as above for now  New catheter replaced     5  Oral pharyngeal dysphagia   Maintain modified diet      6  Anemia   Appears stable on repeat labs   Continue to monitor and transfusional support as per primary      Above plan discussed in detail with the patient at bedside, [facility], NP and Mari Jaime NP      Antibiotics:  Cefazolin D7  Antibiotics D9  Catheter removal 1/3     Subjective:  Patient currently denies having any nausea, vomiting, chest pain or shortness of breath   She denies any tenderness at submandibular lesions that she states has been there for a long time      Objective:  Vitals:  Temp:  [97 9 °F (36 6 °C)-98 9 °F (37 2 °C)] 98 9 °F (37 2 °C)  HR:  [73-90] 90  Resp:  [13-20] 20  BP: (116-156)/(51-74) 142/53  SpO2:  [92 %-100 %] 93 %  Temp (24hrs), Av 3 °F (36 8 °C), Min:97 9 °F (36 6 °C), Max:98 9 °F (37 2 °C)  Current: Temperature: 98 9 °F (37 2 °C)    General Appearance:  Awake, alert, cooperative, resting in bed, no acute distress  Chest: New right anterior chest wall PermCath placed, site nontender  Neck: Movable firm, spongy lesion beneath left submandibular jaw that is nontender and unchanged in size from presence on admission  Fullness and nontender phlebitis at prior left internal jugular PermCath site  Throat: Oropharynx moist without lesions  Lungs:   Clearer to auscultation bilaterally; no wheezes, rhonchi or rales; respirations unlabored   Heart:  RRR; S1-S2 heard, no murmur   Abdomen:   Soft, non-tender, non-distended, positive bowel sounds  Extremities: No edema, arm IV site nontender   Skin: No rashes      Labs, Imaging, & Other studies:   All pertinent labs and imaging studies were personally reviewed  Results from last 7 days   Lab Units 20  0556 20  0559 20  0518   WBC Thousand/uL 6 72 6 41 6 01   HEMOGLOBIN g/dL 9 6* 9 4* 9 6*   PLATELETS Thousands/uL 200 136* 118*     Results from last 7 days   Lab Units 20  0439   POTASSIUM mmol/L 5 4*   CHLORIDE mmol/L 101   CO2 mmol/L 21   BUN mg/dL 50*   CREATININE mg/dL 6 73*   EGFR ml/min/1 73sq m 6   CALCIUM mg/dL 8 3         Results from last 7 days   Lab Units 20  0622 20  0611   BLOOD CULTURE  No Growth After 4 Days  No Growth After 4 Days

## 2020-01-07 NOTE — PLAN OF CARE
Problem: Potential for Falls  Goal: Patient will remain free of falls  Description  INTERVENTIONS:  - Assess patient frequently for physical needs  -  Identify cognitive and physical deficits and behaviors that affect risk of falls    -  [place] fall precautions as indicated by assessment   - Educate patient/family on patient safety including physical limitations  - Instruct patient to call for assistance with activity based on assessment  - Modify environment to reduce risk of injury  - Consider OT/PT consult to assist with strengthening/mobility  Outcome: Progressing     Problem: Prexisting or High Potential for Compromised Skin Integrity  Goal: Skin integrity is maintained or improved  Description  INTERVENTIONS:  - Identify patients at risk for skin breakdown  - Assess and monitor skin integrity  - Assess and monitor nutrition and hydration status  - Monitor labs   - Assess for incontinence   - Turn and reposition patient  - Assist with mobility/ambulation  - Relieve pressure over bony prominences  - Avoid friction and shearing  - Provide appropriate hygiene as needed including keeping skin clean and dry  - Evaluate need for skin moisturizer/barrier cream  - Collaborate with interdisciplinary team   - Patient/family teaching  - Consider wound care consult   Outcome: Progressing     Problem: METABOLIC, FLUID AND ELECTROLYTES - ADULT  Goal: Electrolytes maintained within normal limits  Description  INTERVENTIONS:  - Monitor labs and assess patient for signs and symptoms of electrolyte imbalances  - Administer electrolyte replacement as ordered  - Monitor response to electrolyte replacements, including repeat lab results as appropriate  - Instruct patient on fluid and nutrition as appropriate  Outcome: Progressing  Goal: Fluid balance maintained  Description  INTERVENTIONS:  - Monitor labs   - Monitor I/O and WT  - Instruct patient on fluid and nutrition as appropriate  - Assess for signs & symptoms of volume excess or deficit  Outcome: Progressing     Problem: Nutrition/Hydration-ADULT  Goal: Nutrient/Hydration intake appropriate for improving, restoring or maintaining nutritional needs  Description  Monitor and assess patient's nutrition/hydration status for malnutrition  Collaborate with interdisciplinary team and initiate plan and interventions as ordered  Monitor patient's weight and dietary intake as ordered or per policy  Utilize nutrition screening tool and intervene as necessary  Determine patient's food preferences and provide high-protein, high-caloric foods as appropriate       INTERVENTIONS:  - Monitor oral intake, urinary output, labs, and treatment plans  - Assess nutrition and hydration status and recommend course of action  - Evaluate amount of meals eaten  - Assist patient with eating if necessary   - Allow adequate time for meals  - Recommend/ encourage appropriate diets, oral nutritional supplements, and vitamin/mineral supplements  - Order, calculate, and assess calorie counts as needed  - Recommend, monitor, and adjust tube feedings and TPN/PPN based on assessed needs  - Assess need for intravenous fluids  - Provide specific nutrition/hydration education as appropriate  - Include patient/family/caregiver in decisions related to nutrition  Outcome: Progressing

## 2020-01-07 NOTE — HEMODIALYSIS
Pre weight 76 kg  Post weight 75 5 kg   Removed 0 5 kg  150 ml given for low b/p  Unable to maintain prescribed BFR due to elevated arterial pressure

## 2020-01-07 NOTE — ASSESSMENT & PLAN NOTE
· POA, arrived with shortness of breath from dialysis center  Flu A positive  Status post 5 days Tamiflu    · Continue scheduled Duo-nebs and Albuterol PRN  · Supportive care

## 2020-01-08 ENCOUNTER — TELEPHONE (OUTPATIENT)
Dept: OTHER | Facility: OTHER | Age: 85
End: 2020-01-08

## 2020-01-08 VITALS
BODY MASS INDEX: 29.34 KG/M2 | HEIGHT: 63 IN | TEMPERATURE: 98.4 F | DIASTOLIC BLOOD PRESSURE: 68 MMHG | OXYGEN SATURATION: 90 % | WEIGHT: 165.57 LBS | SYSTOLIC BLOOD PRESSURE: 158 MMHG | HEART RATE: 75 BPM | RESPIRATION RATE: 16 BRPM

## 2020-01-08 PROBLEM — E44.1 MILD PROTEIN-CALORIE MALNUTRITION (HCC): Status: ACTIVE | Noted: 2020-01-08

## 2020-01-08 LAB
BACTERIA BLD CULT: NORMAL
BACTERIA BLD CULT: NORMAL

## 2020-01-08 PROCEDURE — 99239 HOSP IP/OBS DSCHRG MGMT >30: CPT | Performed by: PHYSICIAN ASSISTANT

## 2020-01-08 PROCEDURE — 99232 SBSQ HOSP IP/OBS MODERATE 35: CPT | Performed by: INTERNAL MEDICINE

## 2020-01-08 PROCEDURE — 94760 N-INVAS EAR/PLS OXIMETRY 1: CPT

## 2020-01-08 PROCEDURE — 94640 AIRWAY INHALATION TREATMENT: CPT

## 2020-01-08 RX ORDER — IPRATROPIUM BROMIDE AND ALBUTEROL SULFATE 2.5; .5 MG/3ML; MG/3ML
3 SOLUTION RESPIRATORY (INHALATION)
Refills: 0
Start: 2020-01-08

## 2020-01-08 RX ORDER — FENTANYL 25 UG/H
1 PATCH TRANSDERMAL
Qty: 1 PATCH | Refills: 0 | Status: SHIPPED | OUTPATIENT
Start: 2020-01-08

## 2020-01-08 RX ORDER — BENZONATATE 100 MG/1
100 CAPSULE ORAL 3 TIMES DAILY PRN
Qty: 20 CAPSULE | Refills: 0
Start: 2020-01-08

## 2020-01-08 RX ORDER — CEFAZOLIN SODIUM 2 G/50ML
2000 SOLUTION INTRAVENOUS
Refills: 0
Start: 2020-01-09 | End: 2020-01-17

## 2020-01-08 RX ORDER — METOPROLOL TARTRATE 50 MG/1
50 TABLET, FILM COATED ORAL EVERY 12 HOURS SCHEDULED
Refills: 0
Start: 2020-01-08

## 2020-01-08 RX ADMIN — BUSPIRONE HYDROCHLORIDE 5 MG: 5 TABLET ORAL at 09:12

## 2020-01-08 RX ADMIN — SEVELAMER HYDROCHLORIDE 800 MG: 800 TABLET, FILM COATED PARENTERAL at 09:13

## 2020-01-08 RX ADMIN — IPRATROPIUM BROMIDE AND ALBUTEROL SULFATE 3 ML: 2.5; .5 SOLUTION RESPIRATORY (INHALATION) at 13:13

## 2020-01-08 RX ADMIN — SEVELAMER HYDROCHLORIDE 800 MG: 800 TABLET, FILM COATED PARENTERAL at 12:41

## 2020-01-08 RX ADMIN — KETOROLAC TROMETHAMINE 1 DROP: 5 SOLUTION OPHTHALMIC at 09:13

## 2020-01-08 RX ADMIN — IPRATROPIUM BROMIDE AND ALBUTEROL SULFATE 3 ML: 2.5; .5 SOLUTION RESPIRATORY (INHALATION) at 07:21

## 2020-01-08 RX ADMIN — HEPARIN SODIUM 5000 UNITS: 5000 INJECTION INTRAVENOUS; SUBCUTANEOUS at 05:54

## 2020-01-08 RX ADMIN — SERTRALINE HYDROCHLORIDE 50 MG: 50 TABLET ORAL at 09:13

## 2020-01-08 RX ADMIN — KETOROLAC TROMETHAMINE 1 DROP: 5 SOLUTION OPHTHALMIC at 12:41

## 2020-01-08 RX ADMIN — HEPARIN SODIUM 5000 UNITS: 5000 INJECTION INTRAVENOUS; SUBCUTANEOUS at 13:39

## 2020-01-08 RX ADMIN — AMLODIPINE BESYLATE 10 MG: 10 TABLET ORAL at 09:13

## 2020-01-08 RX ADMIN — IPRATROPIUM BROMIDE AND ALBUTEROL SULFATE 3 ML: 2.5; .5 SOLUTION RESPIRATORY (INHALATION) at 00:59

## 2020-01-08 RX ADMIN — METOPROLOL TARTRATE 50 MG: 50 TABLET, FILM COATED ORAL at 09:12

## 2020-01-08 RX ADMIN — ASPIRIN 81 MG 81 MG: 81 TABLET ORAL at 09:13

## 2020-01-08 NOTE — TRANSPORTATION MEDICAL NECESSITY
Section I - General Information    Name of Patient: Felicitas Ferreira                 : 1935    Medicare #: [id number]  Transport Date: 20 (PCS is valid for round trips on this date and for all repetitive trips in the 60-day range as noted below )  Origin: [facility]: [place]  Is the pt's stay covered under Medicare Part A (PPS/DRG)   [x]     Closest appropriate facility? If no, why is transport to more distant facility required? Yes  If hospice pt, is this transport related to pt's terminal illness? NA       Section II - Medical Necessity Questionnaire  Ambulance transportation is medically necessary only if other means of transport are contraindicated or would be potentially harmful to the patient  To meet this requirement, the patient must either be "bed confined" or suffer from a condition such that transport by means other than ambulance is contraindicated by the patient's condition  The following questions must be answered by the medical professional signing below for this form to be valid:     1)  Describe the MEDICAL CONDITION (physical and/or mental) of this patient AT [address] [place] [address] that requires the patient to be transported in an ambulance and why transport by other means is contraindicated by the patient's condition: Admitted for Influenza A  Hx of Stroke with chronic Aphasia and cognitive impairment, Oropharyngeal dysphagia, ESRD that needed new permacath due to Gram-positive bacteremia  Pt is max assist, non-ambulatory and unable to stand pivot  High Fall Risk  2) Is the patient "bed confined" as defined below? Yes  To be "be confined" the patient must satisfy all three of the following conditions: (1) unable to get up from bed without Assistance; AND (2) unable to ambulate; AND (3) unable to sit in a chair or wheelchair      3) Can this patient safely be transported by car or wheelchair van (i e , seated during transport without a medical attendant or monitoring)? No    4) In addition to completing questions 1-3 above, please check any of the following conditions that apply*:   *Note: supporting documentation for any boxes checked must be maintained in the patient's medical records  If hosp-hosp transfer, describe services needed at 2nd facility not available at 1st facility? Patient is confused  Medical attendant required   Unable to tolerate seated position for time needed to transport       Section III - Signature of Physician or Healthcare Professional  I certify that the above information is true and correct based on my evaluation of this patient, and represent that the patient requires transport by ambulance and that other forms of transport are contraindicated  I understand that this information will be used by the Centers for Medicare and Medicaid Services (CMS) to support the determination of medical necessity for ambulance services, and I represent that I have personal knowledge of the patient's condition at time of transport  [x]  If this box is checked, I also certify that the patient is physically or mentally incapable of signing the ambulance service's claim and that the institution with which I am affiliated has furnished care, services, or assistance to the patient  My signature below is made on behalf of the patient pursuant to 42 CFR §424 36(b)(4)   In accordance with 42 CFR §424 37, the specific reason(s) that the patient is physically or mentally incapable of signing the claim form is as follows:  Cognitive impairment    Signature of Physician* or Healthcare Professional______________________________________________  Signature Date 01/08/20 (For scheduled repetitive transports, this form is not valid for transports performed more than 60 days after this date)    Printed Name & Credentials of Physician or Healthcare Professional (MD, DO, RN, etc )_Quin Catalan__  *Form must be signed by patient's attending physician for scheduled, repetitive transports   For non-repetitive, unscheduled ambulance transports, if unable to obtain the signature of the attending physician, any of the following may sign (choose appropriate option below)  [] Physician Assistant []  Clinical Nurse Specialist []  Registered Nurse  []  Nurse Practitioner  [x] Discharge Planner

## 2020-01-08 NOTE — PLAN OF CARE
Problem: Potential for Falls  Goal: Patient will remain free of falls  Description  INTERVENTIONS:  - Assess patient frequently for physical needs  -  Identify cognitive and physical deficits and behaviors that affect risk of falls    -  [place] fall precautions as indicated by assessment   - Educate patient/family on patient safety including physical limitations  - Instruct patient to call for assistance with activity based on assessment  - Modify environment to reduce risk of injury  - Consider OT/PT consult to assist with strengthening/mobility  Outcome: Progressing     Problem: Prexisting or High Potential for Compromised Skin Integrity  Goal: Skin integrity is maintained or improved  Description  INTERVENTIONS:  - Identify patients at risk for skin breakdown  - Assess and monitor skin integrity  - Assess and monitor nutrition and hydration status  - Monitor labs   - Assess for incontinence   - Turn and reposition patient  - Assist with mobility/ambulation  - Relieve pressure over bony prominences  - Avoid friction and shearing  - Provide appropriate hygiene as needed including keeping skin clean and dry  - Evaluate need for skin moisturizer/barrier cream  - Collaborate with interdisciplinary team   - Patient/family teaching  - Consider wound care consult   Outcome: Progressing     Problem: METABOLIC, FLUID AND ELECTROLYTES - ADULT  Goal: Electrolytes maintained within normal limits  Description  INTERVENTIONS:  - Monitor labs and assess patient for signs and symptoms of electrolyte imbalances  - Administer electrolyte replacement as ordered  - Monitor response to electrolyte replacements, including repeat lab results as appropriate  - Instruct patient on fluid and nutrition as appropriate  Outcome: Progressing  Goal: Fluid balance maintained  Description  INTERVENTIONS:  - Monitor labs   - Monitor I/O and WT  - Instruct patient on fluid and nutrition as appropriate  - Assess for signs & symptoms of volume excess or deficit  Outcome: Progressing     Problem: Nutrition/Hydration-ADULT  Goal: Nutrient/Hydration intake appropriate for improving, restoring or maintaining nutritional needs  Description  Monitor and assess patient's nutrition/hydration status for malnutrition  Collaborate with interdisciplinary team and initiate plan and interventions as ordered  Monitor patient's weight and dietary intake as ordered or per policy  Utilize nutrition screening tool and intervene as necessary  Determine patient's food preferences and provide high-protein, high-caloric foods as appropriate       INTERVENTIONS:  - Monitor oral intake, urinary output, labs, and treatment plans  - Assess nutrition and hydration status and recommend course of action  - Evaluate amount of meals eaten  - Assist patient with eating if necessary   - Allow adequate time for meals  - Recommend/ encourage appropriate diets, oral nutritional supplements, and vitamin/mineral supplements  - Order, calculate, and assess calorie counts as needed  - Recommend, monitor, and adjust tube feedings and TPN/PPN based on assessed needs  - Assess need for intravenous fluids  - Provide specific nutrition/hydration education as appropriate  - Include patient/family/caregiver in decisions related to nutrition  Outcome: Progressing

## 2020-01-08 NOTE — PLAN OF CARE
Problem: Potential for Falls  Goal: Patient will remain free of falls  Description  INTERVENTIONS:  - Assess patient frequently for physical needs  -  Identify cognitive and physical deficits and behaviors that affect risk of falls    -  [place] fall precautions as indicated by assessment   - Educate patient/family on patient safety including physical limitations  - Instruct patient to call for assistance with activity based on assessment  - Modify environment to reduce risk of injury  - Consider OT/PT consult to assist with strengthening/mobility  Outcome: Completed     Problem: Prexisting or High Potential for Compromised Skin Integrity  Goal: Skin integrity is maintained or improved  Description  INTERVENTIONS:  - Identify patients at risk for skin breakdown  - Assess and monitor skin integrity  - Assess and monitor nutrition and hydration status  - Monitor labs   - Assess for incontinence   - Turn and reposition patient  - Assist with mobility/ambulation  - Relieve pressure over bony prominences  - Avoid friction and shearing  - Provide appropriate hygiene as needed including keeping skin clean and dry  - Evaluate need for skin moisturizer/barrier cream  - Collaborate with interdisciplinary team   - Patient/family teaching  - Consider wound care consult   Outcome: Completed     Problem: METABOLIC, FLUID AND ELECTROLYTES - ADULT  Goal: Electrolytes maintained within normal limits  Description  INTERVENTIONS:  - Monitor labs and assess patient for signs and symptoms of electrolyte imbalances  - Administer electrolyte replacement as ordered  - Monitor response to electrolyte replacements, including repeat lab results as appropriate  - Instruct patient on fluid and nutrition as appropriate  Outcome: Completed  Goal: Fluid balance maintained  Description  INTERVENTIONS:  - Monitor labs   - Monitor I/O and WT  - Instruct patient on fluid and nutrition as appropriate  - Assess for signs & symptoms of volume excess or deficit  Outcome: Completed     Problem: Nutrition/Hydration-ADULT  Goal: Nutrient/Hydration intake appropriate for improving, restoring or maintaining nutritional needs  Description  Monitor and assess patient's nutrition/hydration status for malnutrition  Collaborate with interdisciplinary team and initiate plan and interventions as ordered  Monitor patient's weight and dietary intake as ordered or per policy  Utilize nutrition screening tool and intervene as necessary  Determine patient's food preferences and provide high-protein, high-caloric foods as appropriate       INTERVENTIONS:  - Monitor oral intake, urinary output, labs, and treatment plans  - Assess nutrition and hydration status and recommend course of action  - Evaluate amount of meals eaten  - Assist patient with eating if necessary   - Allow adequate time for meals  - Recommend/ encourage appropriate diets, oral nutritional supplements, and vitamin/mineral supplements  - Order, calculate, and assess calorie counts as needed  - Recommend, monitor, and adjust tube feedings and TPN/PPN based on assessed needs  - Assess need for intravenous fluids  - Provide specific nutrition/hydration education as appropriate  - Include patient/family/caregiver in decisions related to nutrition  Outcome: Completed

## 2020-01-08 NOTE — DISCHARGE SUMMARY
Transition of Care Discharge Summary - [facility] Internal Medicine    Patient Information: Princess Huynh 81 y o  female MRN: [id number]  Unit/Bed#: S -01 Encounter: [id number]    Discharging Physician / Practitioner: Una Jose PA-C  PCP: Iva Almanzar MD  Admission Date: 12/29/2019  Discharge Date: 01/08/20    Disposition:      Short Term Rehab or SNF at a [facility] (see below)    For Discharges to [facility]:   · [facility] / Jude Castano at [phone] [facility] Physician    Reason for Admission: SOB    Discharge Diagnoses:     Principal Problem:    Gram-positive bacteremia  Active Problems:    ESRD on hemodialysis ([facility] [address] )    Essential hypertension    Chronic Aphasia / Cognitive impairment    Continuous opioid dependence ([facility] [address] )    Influenza A    non MI troponin elevation    Oropharyngeal dysphagia    Swelling of extremity    Goals of care, counseling/discussion    Neck mass    Mild protein-calorie malnutrition ([facility] [address] )  Resolved Problems:    * No resolved hospital problems  *      Consultations During Hospital Stay:  · Renal  · ID    Procedures Performed:     · HD line removal (1/3/20) and replacement (1/6/20)  · CT soft tissue of neck: 2 5 x 3 0 x 3 3 cm cystic mass identified within the subcutaneous fat lateral to the left submandibular gland  This is inseparable from the skin surface but incompletely evaluated without contrast enhancement  Differential considerations would include sebaceous cyst   Complete evaluation would include contrast enhancement  Consider tissue sampling or surgical excision  Extensive vascular calcification of the left distal common carotid artery and proximal cervical internal carotid artery  Degree of stenosis or occlusion cannot be ascertained without contrast enhancement  Sinus disease including fluid layering in the right maxillary sinus which may represent an acute component of sinusitis    · US head/neck: Large ovoid heterogeneous mass in the left submandibular region, probably an enlarged abnormal lymph node rather than an exophytic salivary gland lesion arising from the submandibular gland  · RUE venous duplex: negative for DVT  · CXR x2: No acute cardiopulmonary disease  · CT head: Stable cerebral atrophy with chronic small vessel ischemic white matter disease and chronic infarction  No acute intracranial abnormality  Medication Adjustments and Discharge Medications:  · Summary of Medication Adjustments made as a result of this hospitalization: ancef post HD ordered, duonebs, stopped labetolol and ordered metoprolol  · Medication Dosing Tapers - Please refer to Discharge Medication List for details on any medication dosing tapers (if applicable to patient)  · Medications being temporarily held (include recommended restart time): none  · Discharge Medication List: See after visit summary for reconciled discharge medications  Wound Care Recommendations:  When applicable, please see wound care section of After Visit Summary  Diet Recommendations at Discharge:  Diet -        Diet Orders   (From admission, onward)             Start     Ordered    01/07/20 0923  Diet Dysphagia/Modified Consistency; Dysphagia 1-Pureed; Nectar Thick Liquid  Effective tomorrow     Comments:  Possible dialysis catheter placement  Monday   Question Answer Comment   Diet Type Dysphagia/Modified Consistency    Dysphagia/Modified Consistency Dysphagia 1-Pureed    Liquid Modifier Nectar Thick Liquid    RD to adjust diet per protocol? Yes        01/07/20 0922    01/02/20 1535  Dietary nutrition supplements  Once     Question Answer Comment   Select Supplement: Magic Cup [place]    Frequency Dinner        01/02/20 1535    12/29/19 1845  Room Service  Once     Question:  Type of Service  Answer:  Room Service - Appropriate with Assistance    12/29/19 1844              Fluid Restriction - No Fluid Restriction at Discharge      Instructions for any Catheters / Lines Present at Discharge (including removal date, if applicable): HD line    Significant Findings / Test Results:     · Staph epidermidis bacteremia  · Influenza a  · Non MI troponin elevation  · Mild malnutrition related to inadequate oral intake    Incidental Findings:   · Extensive vascular calcification of the left distal common carotid artery and proximal cervical internal carotid artery  Degree of stenosis or occlusion cannot be ascertained without contrast enhancement  · Plan for outpatient follow up/US    Test Results Pending at Discharge (will require follow up):   · none     Outpatient Tests Requested:  · Blood cultures 1/30/20  · Follow up with PCP, renal    Complications:  none    Hospital Course:     Meda Nageotte is a 81 y o  female patient who originally presented to the hospital on 12/29/2019 due to shortness of breath  Patient has past medical history of end-stage renal disease on hemodialysis, hypertension, chronic opioid dependence, chronic aphasia and cognitive impairment who was noted to present to the emergency department initially on 12/29/2019 secondary to shortness of breath and was noted to return positive for influenza A  She was noted to be treated with Tamiflu renally dosed  The patient was seen in consultation by Nephrology as well as Cardiology  She was noted to have troponin elevation felt to be non MI troponin and further workup was not recommended  Patient was noted to have blood cultures returned positive for Gram-positive bacteremia  The patient was started on Ancef and vancomycin  Echocardiogram was ordered  Blood cultures were noted turned positive for Staphylococcus epidermidis  Initially was unclear whether this was contaminant versus true bacteremia  There was no evidence of vegetation  Repeat blood cultures were noted to remain positive    This was felt to represent a true bacteremia and felt to be related to recent exchange of her PermCath over a guidewire on 12/27/2019  The patient was noted to be dialyzed, then her line was noted to be removed and allowed for line holiday  PermCath was placed on 1/6/2020  She did have right arm swelling secondary to infiltrated IV which resolved  The patient was noted to have a increase in size neck lesion which had been present since admission however questionably increasing in size and therefore ultrasound was obtained  The patient then underwent a CT to further assess this  This was felt to likely represent sebaceous cyst however was limited without IV contrast   Serial exams remained unchanged  The patient will be discharged with IV antibiotics to complete course for bacteremia  The patient will be dosed with 2 g of Ancef after Tuesday and Thursday sessions of dialysis and 3 g after Saturday  The last dose will be on 1/16/2020  Repeat blood cultures will be obtained on 1/30/2020  Patient was noted to have oropharyngeal dysphagia and was noted to be seen by speech therapy  Goals of care discussion occurred on 1/4/2020 by my colleague  At that time the patient's daughter requested that she be level 3 DNA are and that she would not want artificial nutrition at that time  The patient was not eating well  On the day of discharge and prior she was eating 25-50% of her meals and at the day of discharge was stating she was hungry  Condition at Discharge: stable     Discharge Day Visit / Exam:     Subjective:  Feels okay  Hungry  No nausea or vomiting  Vitals: Blood Pressure: 158/68 (01/08/20 1500)  Pulse: 75 (01/08/20 1500)  Temperature: 98 4 °F (36 9 °C) (01/08/20 1500)  Temp Source: Oral (01/08/20 1500)  Respirations: 16 (01/08/20 0723)  Height: 5' 3" (160 cm) (12/31/19 1224)  Weight - Scale: 75 1 kg (165 lb 9 1 oz) (01/08/20 0600)  SpO2: 90 % (01/08/20 1500)  Exam:   Physical Exam   Constitutional: No distress  HENT:   Head: Normocephalic and atraumatic  Mobile left sided neck mass  Nontender   No warmth  Eyes: No scleral icterus  Cardiovascular: Normal rate, regular rhythm, normal heart sounds and intact distal pulses  No murmur heard  Pulmonary/Chest: Effort normal and breath sounds normal  No accessory muscle usage  No respiratory distress  She has no wheezes  She has no rales  Abdominal: Soft  Bowel sounds are normal  She exhibits no distension  There is no tenderness  There is no rigidity, no rebound and no guarding  Musculoskeletal: She exhibits no edema  Neurological: She is alert  Sitting in bed  Conversing with me  Told me to call and talk to her daughter   Skin: Skin is warm and dry  No rash noted  She is not diaphoretic  No erythema  Psychiatric: She has a normal mood and affect  Her behavior is normal    Nursing note and vitals reviewed  Discussion with Family: daughter via phone    Goals of Care Discussions:  · Code Status at Discharge: Level 3 - DNAR and DNI  · Were there any Goals of Care Discussions during Hospitalization?: Yes  · Results of any General Goals of Care Discussions: DNAR Level 3  No feeding tubes   · POLST Completed: No   · If POLST Completed, Summary of POLST Agreement Provided Here: N/A   · OK to Rehospitalize if Needed? Yes    Discharge instructions/Information to patient and family:   See after visit summary section titled Discharge Instructions for information provided to patient and family  Planned Readmission: no      Discharge Statement:  I spent 60 minutes discharging the patient  This time was spent on the day of discharge  I had direct contact with the patient on the day of discharge  Greater than 50% of the total time was spent examining patient, answering all patient questions, arranging and discussing plan of care with patient as well as directly providing post-discharge instructions  Additional time then spent on discharge activities      ** Please Note: This note has been constructed using a voice recognition system **

## 2020-01-08 NOTE — TELEPHONE ENCOUNTER
Tiger text Dr Jimena Mosher @[phone]    Actual text:  [phone]/ Lisa/ Francesca Escalante/ PT  [facility] [phone]/ New Admissions     Informed caller to call service back within 20 minutes if no response

## 2020-01-08 NOTE — SOCIAL WORK
[facility] for [facility]30[facility] with [employer]  Pt's daughter Jasper Sandoval informed of discharge  IMM reviewed with daughter via phone and she voiced understanding

## 2020-01-08 NOTE — PLAN OF CARE
Problem: Potential for Falls  Goal: Patient will remain free of falls  Description  INTERVENTIONS:  - Assess patient frequently for physical needs  -  Identify cognitive and physical deficits and behaviors that affect risk of falls    -  [place] fall precautions as indicated by assessment   - Educate patient/family on patient safety including physical limitations  - Instruct patient to call for assistance with activity based on assessment  - Modify environment to reduce risk of injury  - Consider OT/PT consult to assist with strengthening/mobility  Outcome: Progressing     Problem: Prexisting or High Potential for Compromised Skin Integrity  Goal: Skin integrity is maintained or improved  Description  INTERVENTIONS:  - Identify patients at risk for skin breakdown  - Assess and monitor skin integrity  - Assess and monitor nutrition and hydration status  - Monitor labs   - Assess for incontinence   - Turn and reposition patient  - Assist with mobility/ambulation  - Relieve pressure over bony prominences  - Avoid friction and shearing  - Provide appropriate hygiene as needed including keeping skin clean and dry  - Evaluate need for skin moisturizer/barrier cream  - Collaborate with interdisciplinary team   - Patient/family teaching  - Consider wound care consult   Outcome: Progressing     Problem: METABOLIC, FLUID AND ELECTROLYTES - ADULT  Goal: Electrolytes maintained within normal limits  Description  INTERVENTIONS:  - Monitor labs and assess patient for signs and symptoms of electrolyte imbalances  - Administer electrolyte replacement as ordered  - Monitor response to electrolyte replacements, including repeat lab results as appropriate  - Instruct patient on fluid and nutrition as appropriate  Outcome: Progressing  Goal: Fluid balance maintained  Description  INTERVENTIONS:  - Monitor labs   - Monitor I/O and WT  - Instruct patient on fluid and nutrition as appropriate  - Assess for signs & symptoms of volume excess or deficit  Outcome: Progressing     Problem: Nutrition/Hydration-ADULT  Goal: Nutrient/Hydration intake appropriate for improving, restoring or maintaining nutritional needs  Description  Monitor and assess patient's nutrition/hydration status for malnutrition  Collaborate with interdisciplinary team and initiate plan and interventions as ordered  Monitor patient's weight and dietary intake as ordered or per policy  Utilize nutrition screening tool and intervene as necessary  Determine patient's food preferences and provide high-protein, high-caloric foods as appropriate       INTERVENTIONS:  - Monitor oral intake, urinary output, labs, and treatment plans  - Assess nutrition and hydration status and recommend course of action  - Evaluate amount of meals eaten  - Assist patient with eating if necessary   - Allow adequate time for meals  - Recommend/ encourage appropriate diets, oral nutritional supplements, and vitamin/mineral supplements  - Order, calculate, and assess calorie counts as needed  - Recommend, monitor, and adjust tube feedings and TPN/PPN based on assessed needs  - Assess need for intravenous fluids  - Provide specific nutrition/hydration education as appropriate  - Include patient/family/caregiver in decisions related to nutrition  Outcome: Progressing

## 2020-01-08 NOTE — PROGRESS NOTES
Progress Note - Infectious Disease   Meda Nageotte 81 y o  female MRN: [id number]  Unit/Bed#: S -01 Encounter: [id number]      Impression/Plan:  1  Staph epidermidis bacteremia  Likely source is PermCath with recent exchange on 12/27  Tammi Daniel blood cultures returned positive, and therefore, catheter removed on 01/03   2D echo unremarkable   Patient without other prosthetic devices  Catheter tip 11 colonies of mixed skin zhanna  Repeat blood cultures without growth  Patient had new PermCath placed   Patient transition to IV Cefazolin after HD dosing yesterday with dialysis and with anticipated discharge soon  Plan for total of 2 weeks of antibiotic with HD through 1/16  Plan for surveillance cultures 2 weeks after treatment, 1/30  Ongoing follow-up by Nephrology  Supportive care as per primary     2  Influenza A   PCR was positive on 12/29/2019  Improved respiratory status  Tamiflu course completed  Monitor for respiratory symptoms     3  Left chin lesion   POA   Likely representative of sebaceous cyst on CT scan of neck obtained today               Serial exams are unchanged     4  End-stage renal disease  On HD    Will continue antibiotics as above for now  New catheter replaced     5  Oral pharyngeal dysphagia   Maintain modified diet      6  Anemia   Appears stable on repeat labs   Continue to monitor and transfusional support as per primary      Above plan discussed in detail with the patient at bedside, [facility], NP and primary care team      Antibiotics:  Cefazolin D8  Antibiotics D10  Catheter removal 1/3     Subjective:  Patient currently denies having any nausea, vomiting, chest pain or shortness of breath   She denies any tenderness at submandibular lesion that she states has been there for a long time      Objective:  Vitals:  Temp:  [98 2 °F (36 8 °C)-98 8 °F (37 1 °C)] 98 2 °F (36 8 °C)  HR:  [64-88] 72  Resp:  [16-18] 16  BP: ()/(26-70) 129/53  SpO2:  [90 %-98 %] 96 %  Temp (24hrs), Av 5 °F (36 9 °C), Min:98 2 °F (36 8 °C), Max:98 8 °F (37 1 °C)  Current: Temperature: 98 2 °F (36 8 °C)    General Appearance:  Awake, alert, cooperative, resting in bed, no acute distress  Throat: Oropharynx moist without lesions  Neck: Movable firm spongy lesion beneath left submandibular jaw unchanged, nontender and not fluctuant  Fullness of prior left internal jugular PermCath site is declining status post removal of PermCath  Chest: New right anterior chest wall PermCath site nontender, noninflamed   Lungs:   Few scattered coarse breath sounds anteriorly, no active cough on exam, respirations unlabored on room air   Heart:  RRR; S1-S2 heard, no murmur   Abdomen:   Soft, non-tender, non-distended, positive bowel sounds  Extremities: No edema, right forearm IV site nontender   Skin: No rashes      Labs, Imaging, & Other studies:   All pertinent labs and imaging studies were personally reviewed  Results from last 7 days   Lab Units 20  0556 20  0559 20  0518   WBC Thousand/uL 6 72 6 41 6 01   HEMOGLOBIN g/dL 9 6* 9 4* 9 6*   PLATELETS Thousands/uL 200 136* 118*     Results from last 7 days   Lab Units 20  0439   POTASSIUM mmol/L 5 4*   CHLORIDE mmol/L 101   CO2 mmol/L 21   BUN mg/dL 50*   CREATININE mg/dL 6 73*   EGFR ml/min/1 73sq m 6   CALCIUM mg/dL 8 3         Results from last 7 days   Lab Units 20  0622 20  0611   BLOOD CULTURE  No Growth After 5 Days  No Growth After 5 Days  2020 CT neck: 2 5 x 3 0 x 3 3 cm cystic mass within the subcutaneous fat lateral to the left submandibular gland seen

## 2020-01-08 NOTE — PROGRESS NOTES
NEPHROLOGY PROGRESS NOTE    Baron Bnaks 81 y o  female MRN: [id number]  Unit/Bed#: S -01 Encounter: [id number]  Reason for Consult:  End-stage renal disease    The patient is more awake and alert able to converse a little better today she actually says she is little hungry and is going to be eating  She is in no distress tolerated dialysis yesterday  ASSESSMENT/PLAN:  1  Renal  Patient is end-stage renal disease hemodialysis is   She underwent treatment yesterday after new PermCath was placed and tolerated the procedure without any reported complications  The patient seems more awake today but could be due to anesthesia wearing off versus clinical improvement  Hemodialysis tomorrow and continue   2  Bacteremia    Randolph due to PermCath which was initially removed surveillance cultures have been negative will continue with Infectious Disease recommendations for Cefazolin through 20  SUBJECTIVE:  Review of Systems   Constitution: Positive for malaise/fatigue  Negative for chills, decreased appetite and fever  I am hungry   HENT: Negative  Eyes: Negative  Cardiovascular: Negative  Negative for chest pain, dyspnea on exertion, leg swelling and orthopnea  Respiratory: Negative  Negative for cough, shortness of breath, sputum production and wheezing  Gastrointestinal: Negative for abdominal pain, anorexia, diarrhea, nausea and vomiting  Neurological:        More alert and able to converse with me better  Patient is more awake and able to answer questions more appropriately  OBJECTIVE:  Current Weight: Weight - Scale: 75 1 kg (165 lb 9 1 oz)  Vitals:Temp (24hrs), Av 5 °F (36 9 °C), Min:98 2 °F (36 8 °C), Max:98 8 °F (37 1 °C)  Current: Temperature: 98 2 °F (36 8 °C)   Blood pressure 129/53, pulse 72, temperature 98 2 °F (36 8 °C), temperature source Axillary, resp   rate 16, height 5' 3" (1 6 m), weight 75 1 kg (165 lb 9 1 oz), SpO2 96 %  , Body mass index is 29 33 kg/m²  Intake/Output Summary (Last 24 hours) at 1/8/2020 1226  Last data filed at 1/8/2020 0723  Gross per 24 hour   Intake 780 ml   Output 1356 ml   Net -576 ml       Physical Exam: /53 (BP Location: Left arm)   Pulse 72   Temp 98 2 °F (36 8 °C) (Axillary)   Resp 16   Ht 5' 3" (1 6 m)   Wt 75 1 kg (165 lb 9 1 oz)   SpO2 96%   BMI 29 33 kg/m²   Physical Exam   Constitutional:  Non-toxic appearance  She does not appear ill  HENT:   Head: Atraumatic  Mouth/Throat: Oropharynx is clear and moist    Eyes: EOM are normal    Neck: Neck supple  No JVD present  Cardiovascular: Normal rate and regular rhythm  Exam reveals no friction rub  No murmur heard  Pulmonary/Chest: Effort normal  She has no decreased breath sounds  She has no wheezes  She has no rhonchi  She has no rales  Abdominal: Soft  Bowel sounds are normal  She exhibits no distension  There is no tenderness         Medications:    Current Facility-Administered Medications:     acetaminophen (TYLENOL) tablet 650 mg, 650 mg, Oral, Q6H PRN, Ada Se, DO, 650 mg at 01/05/20 0957    albuterol inhalation solution 2 5 mg, 2 5 mg, Nebulization, Q6H PRN, Johnny Paula MD    amLODIPine (NORVASC) tablet 10 mg, 10 mg, Oral, Daily, Seth Laughlin MD, 10 mg at 01/08/20 0913    aspirin chewable tablet 81 mg, 81 mg, Oral, Daily, Ada Se, DO, 81 mg at 01/08/20 0913    atorvastatin (LIPITOR) tablet 40 mg, 40 mg, Oral, QPM, Ada Se, DO, 40 mg at 01/07/20 1801    benzonatate (TESSALON PERLES) capsule 100 mg, 100 mg, Oral, TID PRN, Christin Johnson PA-C, 100 mg at 01/03/20 0020    bisacodyl (DULCOLAX) EC tablet 5 mg, 5 mg, Oral, Daily PRN, Ada Se, DO, 5 mg at 12/30/19 1153    busPIRone (BUSPAR) tablet 5 mg, 5 mg, Oral, TID, Ada Se, DO, 5 mg at 01/08/20 0912    [START ON 1/11/2020] ceFAZolin (ANCEF) 3,000 mg in dextrose 5 % 100 mL IVPB, 3,000 mg, Intravenous, After Dialysis, Velvet Vickers MD    ceFAZolin (ANCEF) IVPB (premix) 2,000 mg, 2,000 mg, Intravenous, After Dialysis, Velvet Vickers MD, Last Rate: 100 mL/hr at 01/07/20 1524, 2,000 mg at 01/07/20 1524    doxercalciferol (HECTOROL) injection 1 mcg, 1 mcg, Intravenous, After Dialysis, Rosario Gomesin, DO, 1 mcg at 01/07/20 1405    fentaNYL (DURAGESIC) 25 mcg/hr TD 72 hr patch 1 patch, 1 patch, Transdermal, Q72H, Rosario Friedensburg, DO, 1 patch at 01/07/20 2135    heparin (porcine) subcutaneous injection 5,000 Units, 5,000 Units, Subcutaneous, Q8H [address], Christin Johnson PA-C, 5,000 Units at 01/08/20 0554    ipratropium-albuterol (DUO-NEB) 0 5-2 5 mg/3 mL inhalation solution 3 mL, 3 mL, Nebulization, Q6H, TIANA Mackenzie, 3 mL at 01/08/20 3933    ketorolac (ACULAR) 0 5 % ophthalmic solution 1 drop, 1 drop, Both Eyes, 4x Daily, Rosario Gomesin DO, 1 drop at 01/08/20 0913    metoprolol tartrate (LOPRESSOR) tablet 50 mg, 50 mg, Oral, Q12H [address], Chintan Das MD, 50 mg at 01/08/20 0912    nitroglycerin (NITROSTAT) SL tablet 0 4 mg, 0 4 mg, Sublingual, Q5 Min PRN, Rosario Fried[place], DO    ondansetron [facility]) injection 4 mg, 4 mg, Intravenous, Q6H PRN, TIANA Guzmán, 4 mg at 12/31/19 1318    phenol (CHLORASEPTIC) 1 4 % mucosal liquid 1 spray, 1 spray, Mouth/Throat, Q2H PRN, Christin Johnson PA-C, 1 spray at 01/06/20 1442    polyethylene glycol (MIRALAX) packet 17 g, 17 g, Oral, Daily, Rosario Friedensburg, DO, Stopped at 01/06/20 0857    senna (SENOKOT) tablet 8 6 mg, 1 tablet, Oral, HS, Lucio Velasquez PA-C, 8 6 mg at 01/07/20 2143    sertraline (ZOLOFT) tablet 50 mg, 50 mg, Oral, Daily, Rosario Jesse, DO, 50 mg at 01/08/20 0913    sevelamer (RENAGEL) tablet 800 mg, 800 mg, Oral, TID With Meals, Rosario Fried[place]burg, DO, 800 mg at 01/08/20 0913    Laboratory Results:  Lab Results   Component Value Date    WBC 6 72 01/06/2020    HGB 9 6 (L) 01/06/2020    HCT 31 4 (L) 01/06/2020    MCV 90 01/06/2020     01/06/2020     Lab Results   Component Value Date    SODIUM 138 01/07/2020    K 5 4 (H) 01/07/2020     01/07/2020    CO2 21 01/07/2020    BUN 50 (H) 01/07/2020    CREATININE 6 73 (H) 01/07/2020    GLUC 75 01/07/2020    CALCIUM 8 3 01/07/2020     Lab Results   Component Value Date    CALCIUM 8 3 01/07/2020    PHOS 3 2 01/07/2020     No results found for: LABPROT

## 2020-01-09 ENCOUNTER — TELEPHONE (OUTPATIENT)
Dept: INFECTIOUS DISEASES | Facility: CLINIC | Age: 85
End: 2020-01-09

## 2020-01-09 NOTE — TELEPHONE ENCOUNTER
Called over to [facility] and spoke Felicia Elizondo to let her know the pt will need labs drawn while on Iv antibiotic and blood culture drawn two weeks after completing antibiotics   Faxed to [phone]

## 2020-01-11 ENCOUNTER — TELEPHONE (OUTPATIENT)
Dept: OTHER | Facility: OTHER | Age: 85
End: 2020-01-11

## 2020-01-12 NOTE — TELEPHONE ENCOUNTER
GINA HARPER  [phone]  // PT  Austin BATEMAN  [birth year] // REFUSED CHEST X-RAY AND BLOOD WORK M TT DR Jose Han

## 2020-01-12 NOTE — TELEPHONE ENCOUNTER
Paged Dr Alba Rudolph @ [phone] regarding pt  Being congested in the lungs and refusing medication

## 2020-01-21 DIAGNOSIS — D64.9 ANEMIA, UNSPECIFIED TYPE: Primary | ICD-10-CM

## 2020-01-22 ENCOUNTER — HOSPITAL ENCOUNTER (OUTPATIENT)
Dept: INFUSION CENTER | Facility: CLINIC | Age: 85
End: 2020-01-22

## 2020-01-22 ENCOUNTER — HOSPITAL ENCOUNTER (OUTPATIENT)
Dept: INFUSION CENTER | Facility: CLINIC | Age: 85
Discharge: HOME | End: 2020-01-22
Payer: MEDICARE

## 2020-01-22 ENCOUNTER — APPOINTMENT (OUTPATIENT)
Dept: LAB | Facility: CLINIC | Age: 85
End: 2020-01-22
Payer: MEDICARE

## 2020-01-22 VITALS
HEART RATE: 60 BPM | DIASTOLIC BLOOD PRESSURE: 68 MMHG | OXYGEN SATURATION: 98 % | SYSTOLIC BLOOD PRESSURE: 151 MMHG | TEMPERATURE: 96.5 F | RESPIRATION RATE: 16 BRPM

## 2020-01-22 DIAGNOSIS — D63.1 ANEMIA DUE TO CHRONIC KIDNEY DISEASE, ON CHRONIC DIALYSIS (HCC): Primary | ICD-10-CM

## 2020-01-22 DIAGNOSIS — N18.6 ANEMIA DUE TO CHRONIC KIDNEY DISEASE, ON CHRONIC DIALYSIS (HCC): Primary | ICD-10-CM

## 2020-01-22 DIAGNOSIS — R78.81 GRAM-POSITIVE BACTEREMIA: ICD-10-CM

## 2020-01-22 DIAGNOSIS — Z99.2 ANEMIA DUE TO CHRONIC KIDNEY DISEASE, ON CHRONIC DIALYSIS (HCC): Primary | ICD-10-CM

## 2020-01-22 LAB
ABO GROUP BLD: NORMAL
BASOPHILS # BLD AUTO: 0.04 THOUSANDS/ΜL (ref 0–0.1)
BASOPHILS NFR BLD AUTO: 1 % (ref 0–1)
BLD GP AB SCN SERPL QL: NEGATIVE
EOSINOPHIL # BLD AUTO: 0.93 THOUSAND/ΜL (ref 0–0.61)
EOSINOPHIL NFR BLD AUTO: 15 % (ref 0–6)
ERYTHROCYTE [DISTWIDTH] IN BLOOD BY AUTOMATED COUNT: 17.1 % (ref 11.6–15.1)
HCT VFR BLD AUTO: 26.9 % (ref 34.8–46.1)
HGB BLD-MCNC: 7.9 G/DL (ref 11.5–15.4)
IMM GRANULOCYTES # BLD AUTO: 0.02 THOUSAND/UL (ref 0–0.2)
IMM GRANULOCYTES NFR BLD AUTO: 0 % (ref 0–2)
LYMPHOCYTES # BLD AUTO: 1.34 THOUSANDS/ΜL (ref 0.6–4.47)
LYMPHOCYTES NFR BLD AUTO: 22 % (ref 14–44)
MCH RBC QN AUTO: 28.1 PG (ref 26.8–34.3)
MCHC RBC AUTO-ENTMCNC: 29.4 G/DL (ref 31.4–37.4)
MCV RBC AUTO: 96 FL (ref 82–98)
MONOCYTES # BLD AUTO: 0.61 THOUSAND/ΜL (ref 0.17–1.22)
MONOCYTES NFR BLD AUTO: 10 % (ref 4–12)
NEUTROPHILS # BLD AUTO: 3.19 THOUSANDS/ΜL (ref 1.85–7.62)
NEUTS SEG NFR BLD AUTO: 52 % (ref 43–75)
NRBC BLD AUTO-RTO: 0 /100 WBCS
PLATELET # BLD AUTO: 190 THOUSANDS/UL (ref 149–390)
PMV BLD AUTO: 10.3 FL (ref 8.9–12.7)
RBC # BLD AUTO: 2.81 MILLION/UL (ref 3.81–5.12)
RH BLD: POSITIVE
SPECIMEN EXPIRATION DATE: NORMAL
WBC # BLD AUTO: 6.13 THOUSAND/UL (ref 4.31–10.16)

## 2020-01-22 PROCEDURE — 96374 THER/PROPH/DIAG INJ IV PUSH: CPT

## 2020-01-22 PROCEDURE — 86850 RBC ANTIBODY SCREEN: CPT | Performed by: PHYSICIAN ASSISTANT

## 2020-01-22 PROCEDURE — 86922 COMPATIBILITY TEST ANTIGLOB: CPT

## 2020-01-22 PROCEDURE — 36430 TRANSFUSION BLD/BLD COMPNT: CPT

## 2020-01-22 PROCEDURE — 86901 BLOOD TYPING SEROLOGIC RH(D): CPT | Performed by: PHYSICIAN ASSISTANT

## 2020-01-22 PROCEDURE — 86902 BLOOD TYPE ANTIGEN DONOR EA: CPT

## 2020-01-22 PROCEDURE — P9016 RBC LEUKOCYTES REDUCED: HCPCS

## 2020-01-22 PROCEDURE — 85025 COMPLETE CBC W/AUTO DIFF WBC: CPT

## 2020-01-22 PROCEDURE — 36415 COLL VENOUS BLD VENIPUNCTURE: CPT

## 2020-01-22 PROCEDURE — 86921 COMPATIBILITY TEST INCUBATE: CPT

## 2020-01-22 PROCEDURE — 86900 BLOOD TYPING SEROLOGIC ABO: CPT | Performed by: PHYSICIAN ASSISTANT

## 2020-01-22 PROCEDURE — 87040 BLOOD CULTURE FOR BACTERIA: CPT

## 2020-01-22 RX ORDER — FUROSEMIDE 10 MG/ML
10 INJECTION INTRAMUSCULAR; INTRAVENOUS ONCE
Status: COMPLETED | OUTPATIENT
Start: 2020-01-22 | End: 2020-01-22

## 2020-01-22 RX ORDER — ACETAMINOPHEN 325 MG/1
650 TABLET ORAL ONCE
Status: COMPLETED | OUTPATIENT
Start: 2020-01-22 | End: 2020-01-22

## 2020-01-22 RX ORDER — HEPARIN SODIUM 1000 [USP'U]/ML
2000 INJECTION, SOLUTION INTRAVENOUS; SUBCUTANEOUS ONCE
Status: DISCONTINUED | OUTPATIENT
Start: 2020-01-22 | End: 2020-01-22

## 2020-01-22 RX ORDER — HEPARIN SODIUM 1000 [USP'U]/ML
2000 INJECTION, SOLUTION INTRAVENOUS; SUBCUTANEOUS ONCE
Status: COMPLETED | OUTPATIENT
Start: 2020-01-22 | End: 2020-01-22

## 2020-01-22 RX ORDER — FUROSEMIDE 10 MG/ML
10 INJECTION INTRAMUSCULAR; INTRAVENOUS ONCE
Status: DISCONTINUED | OUTPATIENT
Start: 2020-01-22 | End: 2020-01-25 | Stop reason: HOSPADM

## 2020-01-22 RX ADMIN — ACETAMINOPHEN 650 MG: 325 TABLET ORAL at 13:54

## 2020-01-22 RX ADMIN — FUROSEMIDE 10 MG: 10 INJECTION, SOLUTION INTRAMUSCULAR; INTRAVENOUS at 16:24

## 2020-01-22 RX ADMIN — HEPARIN SODIUM 2000 UNITS: 1000 INJECTION INTRAVENOUS; SUBCUTANEOUS at 16:27

## 2020-01-22 NOTE — PROGRESS NOTES
Nurse spoke with Angela Hopkins Pa-C & informed her pt  Will only be receiving 1 unit of prbc's today & pt  Will be scheduled this week to receive 2nd unit on Saturday d/t scheduling  Nurse made  aware of pts  Need for an appt  & pt  Scheduled accordingly

## 2020-01-22 NOTE — PROGRESS NOTES
Nurse spoke with Magdaleno Ayers RN from [facility] & she instructed nurse on perm-a-cath protocol, nurse is to use blue port and discard 10 mls of blood which includes removing heparin from catheter prior to using  Nurse was instructed to instill heparin 2 mls of heparin with concentration of 1000units/ml into blue port when ready to cap catheter  Steve Rajni stated ok to use caps from our facility  nurse informed the pt  Of the same, pt  Verbalized understanding  Nurse spoke with infusion  Oneta Epley RN & informed her of the same, Bri South is aware & agreeable with the same

## 2020-01-22 NOTE — PROGRESS NOTES
Nurse spoke with Shahida Dent Pa-C Regarding nurse unable to obtain labs & IV access d/t pt being contracted and limited movement of b/l arms  Pt  Has areas where veins feel stiff and hardened  Nurse informed magda that maik would like to speak with nephrologist regarding obtaining labs and Iv access via pts  Perm-a-cath  Magda stated that Dr Romana Turk seen pt  While she was hospitalized & nurse can contact him regarding the same  Nurse also informed Magda that pt  May not get her blood transfusion today d/t pt  Having antibodies & amount of time it may require to obtain blood  Nurse informed Nayana Rivera that she will call her with updates after nurse speaks to nephrology and blood bank  Magda is agreeable & verbalized understanding  Nurse informed the pt  Of the same, pt  Is agreeable & verbalized understanding

## 2020-01-22 NOTE — PROGRESS NOTES
Nurse spoke with Jose De Jesus Castañeda Pa-C & informed her nurse got permission from Dr Tanisha Bhatt to use perm-a cath for labs & blood transfusion today  Nurse informed Magda that pt  May only be able to get 1 unit of blood today & pt  Will need to be scheduled for 2nd unit  Sometime this week either at [facility] infusion or center with availability  Nurse clarified with Magda lasix order & she stated pt  To have 10mg of lasix IVP after each unit of prbc's for total of 20mg which she will send an order to reflect the same

## 2020-01-22 NOTE — PATIENT INSTRUCTIONS
Please assign an assistant/adie to accompany pt  Patient needs frequent repositioning and she is a feed/please send appropriate meal if possible for patient if patient is in infusion center for an extended amount of time  Thank you

## 2020-01-22 NOTE — PROGRESS NOTES
Nurse spoke with Dr Placido Newton  From nephrology and informed him pt  Has no PIV access to obtain labs today or transfuse blood  Dr Placido Newton gave V O  Ok to use perm-a-cath for labs & blood transfusions & he instructed nurse to call [facility] for catheter flush protocol & to inform them nurse will use perm-a-cath to treat pt today

## 2020-01-23 RX ORDER — ACETAMINOPHEN 325 MG/1
650 TABLET ORAL ONCE
Status: DISCONTINUED | OUTPATIENT
Start: 2020-01-25 | End: 2020-01-23 | Stop reason: HOSPADM

## 2020-01-23 RX ORDER — HEPARIN SODIUM 1000 [USP'U]/ML
2000 INJECTION, SOLUTION INTRAVENOUS; SUBCUTANEOUS ONCE
Status: DISCONTINUED | OUTPATIENT
Start: 2020-01-25 | End: 2020-01-23 | Stop reason: HOSPADM

## 2020-01-23 RX ORDER — HEPARIN SODIUM 1000 [USP'U]/ML
2000 INJECTION, SOLUTION INTRAVENOUS; SUBCUTANEOUS ONCE
Status: DISCONTINUED | OUTPATIENT
Start: 2020-01-25 | End: 2020-01-23

## 2020-01-23 RX ORDER — FUROSEMIDE 10 MG/ML
10 INJECTION INTRAMUSCULAR; INTRAVENOUS ONCE
Status: DISCONTINUED | OUTPATIENT
Start: 2020-01-25 | End: 2020-01-23 | Stop reason: HOSPADM

## 2020-01-24 LAB
ABO GROUP BLD BPU: NORMAL
ABO GROUP BLD BPU: NORMAL
BPU ID: NORMAL
BPU ID: NORMAL
CROSSMATCH: NORMAL
CROSSMATCH: NORMAL
UNIT DISPENSE STATUS: NORMAL
UNIT DISPENSE STATUS: NORMAL
UNIT PRODUCT CODE: NORMAL
UNIT PRODUCT CODE: NORMAL
UNIT RH: NORMAL
UNIT RH: NORMAL

## 2020-01-25 ENCOUNTER — HOSPITAL ENCOUNTER (OUTPATIENT)
Dept: INFUSION CENTER | Facility: CLINIC | Age: 85
Discharge: HOME | End: 2020-01-25

## 2020-01-27 ENCOUNTER — OFFICE VISIT (OUTPATIENT)
Dept: CARDIOLOGY CLINIC | Facility: CLINIC | Age: 85
End: 2020-01-27
Payer: MEDICARE

## 2020-01-27 VITALS
OXYGEN SATURATION: 98 % | DIASTOLIC BLOOD PRESSURE: 70 MMHG | BODY MASS INDEX: 29.33 KG/M2 | HEIGHT: 63 IN | HEART RATE: 77 BPM | SYSTOLIC BLOOD PRESSURE: 134 MMHG

## 2020-01-27 DIAGNOSIS — I10 ESSENTIAL HYPERTENSION: Primary | Chronic | ICD-10-CM

## 2020-01-27 DIAGNOSIS — R77.8 TROPONIN LEVEL ELEVATED: ICD-10-CM

## 2020-01-27 LAB — BACTERIA BLD CULT: NORMAL

## 2020-01-27 PROCEDURE — 99213 OFFICE O/P EST LOW 20 MIN: CPT | Performed by: INTERNAL MEDICINE

## 2020-01-27 NOTE — PROGRESS NOTES
Cardiology Follow Up    Mansi Knowles  1935  [id number]  [address] [facility] [place]  [facility] [address]  [phone]    No diagnosis found  There are no diagnoses linked to this encounter  I had the pleasure of seeing Mansi Knowles for a follow up visit  INTERVAL HISTORY:  None    History of the presenting illness, Discussion/Summary and My Plan are as follows:::    She is a pleasant 52-year-old with history of end-stage renal disease on hemodialysis, diabetes, hypertension, dyslipidemia, chronic pain, aphasia from a prior CVA, without a prior cardiac history at baseline, admitted with influenza with a troponin elevation-peak troponin less than to, cardiology consultation was sought-in December 2019, attributed to a non MI troponin elevation in the setting of influenza and hypertension  A conservative management strategy was followed with simply increasing her metoprolol dose (due to her age, comorbidities and debility  )  Aspirin and statin were continued  She also had gram positive bacteremia and hemodialysis catheter was removed  She presents for a follow-up visit  She is unable to provide any history  She appears hemodynamically stable  Is coughing a little bit  Examination was limited but lungs were clear to auscultation  Heart sounds were normal     She is quite debilitated and needed to be brought in by a transport crew  Plan:    Troponin elevation:  Non MI troponin elevation in the setting of influenza and hypertension  No further evaluation is warranted  Echo  suggested inferior and inferolateral hypokinesis  Continue beta-blocker  Continue aspirin statin  History of stroke and aphasia:  Medical management with statin and aspirin  End-stage renal disease-on hemodialysis  Hypertension:  Now controlled  Follow-up as needed      Patient Active Problem List   Diagnosis  Mixed aphasia    [facility] hemod[facility] ([email or web address]    Essential hypertension    Acute encephalopathy    Aphasia    Other chronic pain    Ambulatory dysfunction    Chronic Aphasia / Cognitive impairment    Constipation due to opioid therapy    Problem with vascular access    Continuous opioid dependence (HCC)    Influenza A    non MI troponin elevation    Oropharyngeal dysphagia    Gram-positive bacteremia    Thrombocytopenia ([facility])    Swelling of extremity    Goals of care, counseling/discussion    Neck mass    Mild protein-calorie malnutrition ([facility])    Anemia due to chronic kidney disease, on chronic dialysis ([facility])     Past Medical History:   Diagnosis Date    Altered mental status, unspecified     Anemia     Atherosclerotic heart disease of native coronary artery without angina pectoris     Cancer ([facility])     Chest pain     Chronic diastolic (congestive) heart failure (HCC)     Dependence on renal dialysis ([email or web address] )     Diabetes mellitus ([facility])     End-stage renal disease ([facility])     Gastro-esophageal reflux disease without esophagitis     Hyperlipidemia     Hypertension     Long term current use of anticoagulant     Long term current use of insulin ([facility])     Muscle weakness     Nausea with vomiting     Other abnormalities of gait and mobility     Other specified abnormal findings of blood chemistry     Type 2 diabetes mellitus ([facility])     Ventral hernia without obstruction or gangrene     Vitamin D deficiency      Social History     Socioeconomic History    Marital status:      Spouse name: Not on file    Number of children: Not on file    Years of education: Not on file    Highest education level: Not on file   Occupational History    Not on file   Social Needs    Financial resource strain: Not on file    Food insecurity:     Worry: Not on file     Inability: Not on file    Transportation needs:     Medical: Not on file     Non-medical: Not on file   Tobacco Use    Smoking status: Never Smoker    Smokeless tobacco: Never Used   Substance and Sexual Activity    Alcohol use: No    Drug use: No    Sexual activity: Not on file   Lifestyle    Physical activity:     Days per week: Not on file     Minutes per session: Not on file    Stress: Not on file   Relationships    Social connections:     Talks on phone: Not on file     Gets together: Not on file     Attends Spiritism service: Not on file     Active member of club or organization: Not on file     Attends meetings of clubs or organizations: Not on file     Relationship status: Not on file    Intimate partner violence:     Fear of current or ex partner: Not on file     Emotionally abused: Not on file     Physically abused: Not on file     Forced sexual activity: Not on file   Other Topics Concern    Not on file   Social History Narrative    Not on file      History reviewed  No pertinent family history    Past Surgical History:   Procedure Laterality Date    AV FISTULA PLACEMENT      IR [address] EXCHANGE  12/27/2019    IR PERMACATH PLACEMENT  1/6/2020    IR [address] REMOVAL  1/3/2020    MASTECTOMY      R arm       Current Outpatient Medications:     acetaminophen (TYLENOL) 325 mg tablet, Take 650 mg by mouth every 6 (six) hours as needed for mild pain, Disp: , Rfl:     albuterol (2 5 mg/3 mL) 0 083 % nebulizer solution, Take 2 5 mg by nebulization every 6 (six) hours as needed for wheezing, Disp: , Rfl:     albuterol (PROVENTIL HFA,VENTOLIN HFA) 90 mcg/act inhaler, Inhale 2 puffs every 6 (six) hours as needed for wheezing, Disp: , Rfl:     amLODIPine (NORVASC) 10 mg tablet, Take 1 tablet (10 mg total) by mouth daily Home med, Disp: , Rfl: 0    aspirin 81 mg chewable tablet, Chew 1 tablet (81 mg total) daily, Disp: 30 tablet, Rfl: 0    atorvastatin (LIPITOR) 40 mg tablet, Take 1 tablet (40 mg total) by mouth every evening, Disp: 30 tablet, Rfl: 0    benzonatate (TESSALON PERLES) 100 mg capsule, Take 1 capsule (100 mg total) by mouth 3 (three) times a day as needed for cough, Disp: 20 capsule, Rfl: 0    bisacodyl (DULCOLAX) 5 mg EC tablet, Take 5 mg by mouth daily as needed for constipation, Disp: , Rfl:     busPIRone (BUSPAR) 5 mg tablet, Take 5 mg by mouth 3 (three) times a day, Disp: , Rfl:     docusate sodium (COLACE) 100 mg capsule, Take 100 mg by mouth 2 (two) times a day, Disp: , Rfl:     fentaNYL (DURAGESIC) 25 mcg/hr, Place 1 patch on the skin every third dayMax Daily Amount: 1 patch, Disp: 1 patch, Rfl: 0    guaiFENesin (MUCINEX) 600 mg 12 hr tablet, Take 1 tablet (600 mg total) by mouth every 12 (twelve) hours, Disp: 30 tablet, Rfl: 0    ipratropium-albuterol (DUO-NEB) 0 5-2 5 mg/3 mL nebulizer solution, Take 1 vial (3 mL total) by nebulization every 6 (six) hours, Disp: , Rfl: 0    ketorolac (ACULAR) 0 4 % SOLN, 1 drop 4 (four) times a day, Disp: , Rfl:     metoprolol tartrate (LOPRESSOR) 50 mg tablet, Take 1 tablet (50 mg total) by mouth every 12 (twelve) hours, Disp: , Rfl: 0    nitroglycerin (NITROSTAT) 0 4 mg SL tablet, Place 0 4 mg under the tongue every 5 (five) minutes as needed for chest pain, Disp: , Rfl:     polyethylene glycol (MIRALAX) 17 g packet, Take 17 g by mouth daily, Disp: , Rfl:     sertraline (ZOLOFT) 25 mg tablet, Take 50 mg by mouth daily, Disp: , Rfl:     sevelamer (RENAGEL) 800 mg tablet, Take 1 tablet (800 mg total) by mouth 3 (three) times a day with meals Home med, Disp: , Rfl: 0  Allergies   Allergen Reactions    Percolone [Oxycodone]     Sulfa Antibiotics        Imaging: Xr Chest Portable    Result Date: 12/31/2019  Narrative: CHEST INDICATION:   eval for developing infiltrate  COMPARISON:  Chest radiograph from 12/29/2019  EXAM PERFORMED/VIEWS:  XR CHEST PORTABLE FINDINGS:  Left central catheter at cavoatrial junction  Cardiomediastinal silhouette appears unremarkable  The lungs are clear  No pneumothorax or pleural effusion   Osseous structures appear within normal limits for patient age  Impression: No acute cardiopulmonary disease  Workstation performed: [id number]     Xr Chest 2 Views    Result Date: 12/30/2019  Narrative: CHEST INDICATION:   dyspnea  COMPARISON:  12/28/2019 EXAM PERFORMED/VIEWS:  XR CHEST PA & LATERAL Images: 2 FINDINGS: Cardiomediastinal silhouette appears enlarged  A permacath is unchanged entering on the left  The lungs are clear  No pneumothorax or pleural effusion  Osseous structures appear within normal limits for patient age  Impression: No acute cardiopulmonary disease  Workstation performed: [id number]     Ct Soft Tissue Neck Wo Contrast    Result Date: 1/8/2020  Narrative: CT SOFT TISSUE NECK WITHOUT CONTRAST INDICATION:   Neck mass, solitary, afebrile (Age => 16y)  COMPARISON:  None  TECHNIQUE:  Axial, sagittal, and coronal 2D reformatted images were created from the axial source data and submitted for interpretation  Radiation dose length product (DLP) for this visit:  845 mGy-cm   This examination, like all CT scans performed in the [facility], was performed utilizing techniques to minimize radiation dose exposure, including the use of iterative reconstruction and automated exposure control  IMAGE QUALITY:  Diagnostic  FINDINGS: VISUALIZED BRAIN PARENCHYMA:  Normal visualized brain parenchyma  VISUALIZED ORBITS AND PARANASAL SINUSES:  Unremarkable orbits  Mucosal thickening of the ethmoid air cells and right maxillary sinus  There is a retention cyst within the right maxillary sinus with a small amount of fluid layering posteriorly  NASAL CAVITY AND NASOPHARYNX:  Normal  SUPRAHYOID NECK:  Normal oropharynx and oral cavity  Normal parapharyngeal and retropharyngeal spaces  Normal tonsillar tissue and epiglottis  Normal infratemporal space  Along the inferior lateral aspect of the left submandibular gland is a cystic mass measuring 2 5 x 3 0 x 3 3 cm    This has central fluid density with Hounsfield units measuring slightly less than 0  This does not arise from the submandibular gland itself but is located within the superficial subcutaneous fat and is inseparable from the skin surface  INFRAHYOID NECK:  Aryepiglottic folds and piriform sinuses  Normal larynx and subglottic airway  THYROID GLAND:  Unremarkable  PAROTID AND SUBMANDIBULAR GLANDS: Normal  LYMPH NODES:  No pathologic or enlarged adenopathy  VASCULAR STRUCTURES:  Vascular calcification of the thoracic aorta  There is calcification of the distal common carotid artery and bifurcation bilaterally, left greater than right  Calcification on the left is extensive suggesting severe stenosis or possibly occlusion  THORACIC INLET:  Lung apices and upper mediastinum are unremarkable  BONY STRUCTURES:  Exaggerated cervical and thoracic kyphosis  There is anterior subluxation of C3 upon C4  Diffuse osteopenia of the bony structures with multilevel cervical spondylitic degenerative change  Osteomalacia of the maxilla  Impression: 2 5 x 3 0 x 3 3 cm cystic mass identified within the subcutaneous fat lateral to the left submandibular gland, best seen on series 2 image 56 and coronal reconstructions, also image 56  This is inseparable from the skin surface but incompletely evaluated without contrast enhancement  Differential considerations would include sebaceous cyst   Complete evaluation would include contrast enhancement  Consider tissue sampling or surgical excision  Extensive vascular calcification of the left distal common carotid artery and proximal cervical internal carotid artery  Degree of stenosis or occlusion cannot be ascertained without contrast enhancement  Sinus disease including fluid layering in the right maxillary sinus which may represent an acute component of sinusitis   Workstation performed: [id number]     Us Head Neck Soft Tissue    Result Date: 1/6/2020  Narrative: HEAD AND NECK SOFT TISSUE ULTRASOUND INDICATION: Golf ball size lump on left neck, POA  COMPARISON:  None TECHNIQUE:   Real-time ultrasound of the left submandibular was performed with a linear transducer with both volumetric sweeps and still imaging techniques  FINDINGS:  Ovoid heterogeneous solid appearing mass identified in the left submandibular region measures 3 0 x 1 9 x 3 8 cm  No definite internal vascularity  This is close to the left submandibular gland but likely separate from it  Impression: 1  Large ovoid heterogeneous mass in the left submandibular region, probably an enlarged abnormal lymph node rather than an exophytic salivary gland lesion arising from the submandibular gland  Dedicated neck CT with contrast would be helpful for further evaluation  The study was marked in EPIC for significant notification  Workstation performed: [id number]     Vas Upper Limb Venous Duplex Scan, Unilateral/limited    Result Date: 12/31/2019  Narrative:  [facility] REPORT CLINICAL: Indications:  Right Swelling [M79 89]  Patient presents with right arm swelling  Operative History: 2013-05-21 Left PTA subclavian 2012-06-11 Left PTA mid AVF, BCV, distal vivienne/interposition Left dialysis access upper arm Left [place] [facility] Risk Factors The patient has history of HTN and CKD  She has no history of Diabetes, HLD or DVT  FINDINGS:  Right            Impression       Innominate Vein  Normal (Patent)     CONCLUSION: Impression RIGHT UPPER LIMB: No evidence of acute or chronic deep vein thrombosis  No evidence of superficial thrombophlebitis noted  Doppler evaluation shows a normal response to augmentation maneuvers  LEFT UPPER LIMB LIMITED: Evaluation shows no evidence of thrombus in the internal jugular vein  Tech note: Limited eval due to patient with lack of mobility of right arm    SIGNATURE: Electronically Signed by: Willem Linn MD on 2019-12-31 07:59:59 PM    Ir Permacath Placement    Result Date: 1/6/2020  Narrative: TUNNELED DIALYSIS CATHETER INSERTION Venogram of the right internal jugular vein Clinical History: end stage renal disease  Left neck tunneled dialysis catheter recently removed for bacteremia  Patient will require continued dialysis  Fluoro time: 3 2 Number of Images: 269 Conscious sedation time: 50 minutes Antibiotics: Ancef Procedure: After explaining the risks and benefits of the procedure to the patient, informed consent was obtained from the patients decision maker (daughter)  The patient's right neck was prepped and draped in usual sterile fashion and local anesthesia was obtained with the 1% lidocaine solution  The right internal jugular vein was evaluated as a potential access site  The vein is small but patent, compressible, and free of thrombus  A micropuncture needle was used to access the right internal jugular vein under ultrasound guidance  A static ultrasound image was recorded  A 0 018 wire was passed into the central circulation but would only pass cranial   A 3-Latvian inner portion of a micropuncture set was then inserted and pulled back venography performed  A 0 018 Nitrex wire was used to navigate through the tortuosity and into the superior vena cava  A micropuncture 5 Western Earline exchange dilator was placed  Using a generous amount of local anesthesia, a subcutaneous tunnel was made from the mid clavicular line to the venotomy site  A 0 035 amplatz wire was passed into the inferior vena cava under fluoroscopic guidance  The venotomy site was then sequentially dilated to accept a 16 Latvian peel-away sheath  A 28 cm tunneled dialysis catheter was inserted and positioned with the tips in the right atrium under fluoroscopic guidance  No kinks are present in the catheter  Each port aspirated and flushed without resistance  The venotomy site was closed with 3-0 vicryl and glue   The catheter was secured in place using 2-0 prolene sutures  Each port was flushed with heparin as per protocol   The patient tolerated the procedure well and left the department in stable condition  Findings: Small patent right internal jugular vein  Venography of the right internal jugular vein reveals tortuosity associated with probable chronic scar at the jugular/subclavian confluence  This may relate to a prior fistula as reported by the patient's family, or prior catheters  Successful navigation of this tortuous segment and placement of a stiff wire over which serial dilation was performed and then placement of a 14-Indonesian dual-lumen tunneled right internal jugular dialysis catheter  IMPRESSION Ultrasound and fluoroscopically guided placement of a right internal jugular vein 14 Romanian 28 cm tunneled dialysis catheter  Right jugular vein stenosis/tortuosity at the brachiocephalic confluence successfully crossed for this catheter placement  Plan: Catheter is ready for immediate use  Workstation performed: [id number]     Ir Permacath Removal    Result Date: 1/3/2020  Narrative: Perma-Cath removal  Clinical History: Patient presenting for Perma-Cath removal  The existing catheter was prepped and draped in the usual sterile fashion  Lidocaine was administered the skin and subcutaneous tissues  The cuff was dissected free, and the catheter was removed  Manual compression was applied to the puncture site and tunnel until hemostasis was achieved  The patient tolerated the procedure well and suffered no complications  Impression: Impression: Successful Perma-Cath removal as described  Workstation performed: [id number]       Review of Systems:  Review of Systems   Unable to perform ROS: Patient nonverbal   Respiratory: Positive for cough  Negative for apnea, choking, chest tightness, wheezing and stridor  Physical Exam:  /70 (BP Location: Left arm, Patient Position: Supine, Cuff Size: Large)   Pulse 77   Ht 5' 3" (1 6 m)   SpO2 98%   BMI 29 33 kg/m²   Physical Exam   Constitutional: She appears well-developed and well-nourished  No distress  Appears debilitated   HENT:   Head: Normocephalic and atraumatic  Eyes: Pupils are equal, round, and reactive to light  Conjunctivae are normal  Right eye exhibits no discharge  Left eye exhibits no discharge  Neck: Normal range of motion  No JVD present  No tracheal deviation present  No thyromegaly present  Cyst present left upper neck   Cardiovascular: Normal rate and regular rhythm  Exam reveals no gallop and no friction rub  No murmur heard  Pulmonary/Chest: Effort normal and breath sounds normal  No stridor  No respiratory distress  She has no wheezes  Abdominal: Soft  Bowel sounds are normal  She exhibits no distension  There is no tenderness  There is no guarding  Musculoskeletal: She exhibits no edema  Skin: Skin is warm  No rash noted  She is not diaphoretic  No erythema

## 2020-01-28 ENCOUNTER — TELEPHONE (OUTPATIENT)
Dept: INFECTIOUS DISEASES | Facility: CLINIC | Age: 85
End: 2020-01-28

## 2020-01-28 NOTE — TELEPHONE ENCOUNTER
Attempted to contact pt's ec to report negative blood cultures  No answer and voicemail was generic  Will attempt to call again tomorrow

## 2020-01-29 ENCOUNTER — TELEPHONE (OUTPATIENT)
Dept: GERIATRICS | Age: 85
End: 2020-01-29

## 2020-01-29 ENCOUNTER — NURSING HOME VISIT (OUTPATIENT)
Dept: GERIATRICS | Facility: OTHER | Age: 85
End: 2020-01-29
Payer: MEDICARE

## 2020-01-29 ENCOUNTER — TELEPHONE (OUTPATIENT)
Dept: INFECTIOUS DISEASES | Facility: CLINIC | Age: 85
End: 2020-01-29

## 2020-01-29 ENCOUNTER — TELEPHONE (OUTPATIENT)
Dept: OTHER | Facility: OTHER | Age: 85
End: 2020-01-29

## 2020-01-29 DIAGNOSIS — R41.89 COGNITIVE IMPAIRMENT: ICD-10-CM

## 2020-01-29 DIAGNOSIS — F11.20 CONTINUOUS OPIOID DEPENDENCE (HCC): ICD-10-CM

## 2020-01-29 DIAGNOSIS — F32.A DEPRESSION, UNSPECIFIED DEPRESSION TYPE: ICD-10-CM

## 2020-01-29 DIAGNOSIS — Z51.5 HOSPICE CARE PATIENT: Primary | ICD-10-CM

## 2020-01-29 DIAGNOSIS — N18.6 ESRD ON HEMODIALYSIS (HCC): Chronic | ICD-10-CM

## 2020-01-29 DIAGNOSIS — Z99.2 ESRD ON HEMODIALYSIS (HCC): Chronic | ICD-10-CM

## 2020-01-29 DIAGNOSIS — I10 ESSENTIAL HYPERTENSION: Chronic | ICD-10-CM

## 2020-01-29 PROCEDURE — 99309 SBSQ NF CARE MODERATE MDM 30: CPT | Performed by: FAMILY MEDICINE

## 2020-01-29 NOTE — TELEPHONE ENCOUNTER
Mahsa Parmar from [place] just wanted to send an FYI that Pt is Dafne Albertina DUNNB 1935  Pt's patched was removed and new one was applied  Pt is stable

## 2020-01-29 NOTE — PROGRESS NOTES
[facility]  [address]  [phone]  [address]  [place]   HOSPICE         NAME: Maicol Logan  AGE: 81 y o  SEX: female    DATE OF ENCOUNTER: 1/29/2020    Assessment and Plan     Essential hypertension  BP log reviewed, stable  Continue metoprolol tartrate 50mg PO BID and amlodipine 10mg PO QD  Continue atorvastatin 40mg PO QHS  ESRD on hemodialysis ([facility] [address] )  Pt no longer participating in HD as pt now on hospice service  Continuous opioid dependence (HCC)  Continue fentanyl 50 mcg/hr and change patch Q72H  Started morphine concentrate 20mg/mL 0 25mL Q4H prn for SOB/breakthrough pain  Hyoscyamine 0 125mg/mL 1mL prn for increased secretions  Pt will transition from fentanyl to morphine with equivalent MME for standing order at next visit  Hospice care patient  Pt signed onto hospice services on 1/28/2020  Prn medication (morphine, hyoscyamine) ordered per recommendation of hospice nursing  Chronic Aphasia / Cognitive impairment  Continue supportive care  Depression  Continue buspar and sertraline  All medications and routine orders were reviewed and updated as needed  Chief Complaint     LTCF follow-up    History of Present Illness     BL is an 82 yo AAF with multiple medical comorbidities including but not limited to cognitive impairment, HTN, ESRD, and chronic pain, interviewed and examined in collaboration with nursing for LTCF follow-up and follow-up after signing onto hospice services  Pt unable to provide history due to cognitive impairment/aphasia  No concerns from nursing  The patient's allergies, past medical, surgical, social and family history were reviewed and unchanged  Review of Systems     Review of Systems   Unable to perform ROS: Patient nonverbal         Objective     Vitals: 134/53-97 5F- 77bpm- 174 8lb-18-94% on RA-   POC glucose: 149    Physical Exam   Constitutional: No distress     Chronically ill appearing female, lethargic lying in bed comfortably   HENT:   Head: Normocephalic and atraumatic  Nose: Nose normal    Mouth/Throat: Oropharynx is clear and moist  No oropharyngeal exudate  Eyes: Pupils are equal, round, and reactive to light  Conjunctivae are normal  Right eye exhibits no discharge  Left eye exhibits no discharge  No scleral icterus  Neck:   L Neck mass noted, mobile  Cardiovascular: Normal rate and regular rhythm  Exam reveals no gallop and no friction rub  No murmur heard  Pulmonary/Chest: Effort normal and breath sounds normal  No stridor  No respiratory distress  She has no wheezes  She has no rales  Abdominal: Soft  Bowel sounds are normal  She exhibits no distension  There is no tenderness  There is no rebound and no guarding  Musculoskeletal: She exhibits no edema or tenderness  Neurological: She is alert  Skin: She is not diaphoretic  Psychiatric:   Unable to cooperate with exam due to lethargy and cognitive impairment  Nursing note and vitals reviewed  Pertinent Laboratory/Diagnostic Studies:  Reviewed in facility chart    Current Medications   Medications reviewed and updated see facility chart for details      Octaviano Sibley PA-C  1/29/2020 2:56 PM

## 2020-01-29 NOTE — ASSESSMENT & PLAN NOTE
Pt signed onto hospice services on 1/28/2020  Prn medication (morphine, hyoscyamine) ordered per recommendation of hospice nursing

## 2020-01-29 NOTE — TELEPHONE ENCOUNTER
Ramiro Clark RN with [facility] called ([phone]) to request a verbal certification from Provider to admit to hospice and to obtain agreement with diagnosis for hospice

## 2020-01-29 NOTE — TELEPHONE ENCOUNTER
Contacted pt's daughter [employer] with negative blood culture results  [employer] thanks us for our call

## 2020-01-29 NOTE — ASSESSMENT & PLAN NOTE
BP log reviewed, stable  Continue metoprolol tartrate 50mg PO BID and amlodipine 10mg PO QD  Continue atorvastatin 40mg PO QHS

## 2020-01-29 NOTE — ASSESSMENT & PLAN NOTE
Continue fentanyl 50 mcg/hr and change patch Q72H  Started morphine concentrate 20mg/mL 0 25mL Q4H prn for SOB/breakthrough pain  Hyoscyamine 0 125mg/mL 1mL prn for increased secretions  Pt will transition from fentanyl to morphine with equivalent MME for standing order at next visit

## 2020-02-01 ENCOUNTER — TELEPHONE (OUTPATIENT)
Dept: OTHER | Facility: OTHER | Age: 85
End: 2020-02-01

## 2020-02-01 NOTE — TELEPHONE ENCOUNTER
Raquel Ponce from [facility]/ [phone]/ PT: Ruddy Mcdermott/ : 2611/ New medication order/

## 2020-02-01 NOTE — TELEPHONE ENCOUNTER
Carmencita Jaffe from [address] [place] [address] [phone]/ PT: Daniel Snyder YOB: 1935/ Verify medication orders/

## 2020-02-01 NOTE — TELEPHONE ENCOUNTER
[phone]/[place]/[facility]/Marya Mcdermott/[birth year]/Medication orders orders    TT Dr Jimbo Dow  02/01/20 @ [facility] [id number]

## 2020-02-15 ENCOUNTER — TELEPHONE (OUTPATIENT)
Dept: OTHER | Facility: OTHER | Age: 85
End: 2020-02-15

## 2020-02-16 NOTE — TELEPHONE ENCOUNTER
Francesca kong called in to report the death of the pt and requested to speak to on call provider  Sent page on amion to [facility]

## 2020-09-04 NOTE — ED NOTES
cxr in room       Richi Canela RN  12/28/19 0880 Continue Regimen: Alternating between Altreno 0.05% lotion and tretinoin 0.05% cream \\nSpiromolactone 50mg PO QD \\nTri-Previfem QD Detail Level: Zone Otc Regimen: 4-5% BP wash MWF
